# Patient Record
Sex: FEMALE | Race: WHITE | NOT HISPANIC OR LATINO | Employment: PART TIME | URBAN - NONMETROPOLITAN AREA
[De-identification: names, ages, dates, MRNs, and addresses within clinical notes are randomized per-mention and may not be internally consistent; named-entity substitution may affect disease eponyms.]

---

## 2017-01-16 ENCOUNTER — MEDICAL CORRESPONDENCE (OUTPATIENT)
Dept: HEALTH INFORMATION MANAGEMENT | Facility: HOSPITAL | Age: 57
End: 2017-01-16

## 2017-01-17 DIAGNOSIS — M54.2 CERVICALGIA: Primary | ICD-10-CM

## 2017-01-23 ENCOUNTER — HOSPITAL ENCOUNTER (OUTPATIENT)
Dept: INTERVENTIONAL RADIOLOGY/VASCULAR | Facility: HOSPITAL | Age: 57
Discharge: HOME | End: 2017-01-23
Attending: NURSE PRACTITIONER | Admitting: NURSE PRACTITIONER
Payer: COMMERCIAL

## 2017-01-23 PROCEDURE — 25000125 ZZHC RX 250

## 2017-01-23 PROCEDURE — 25000125 ZZHC RX 250: Performed by: RADIOLOGY

## 2017-01-23 PROCEDURE — 62321 NJX INTERLAMINAR CRV/THRC: CPT | Mod: TC

## 2017-01-23 RX ORDER — IOPAMIDOL 612 MG/ML
15 INJECTION, SOLUTION INTRATHECAL ONCE
Status: COMPLETED | OUTPATIENT
Start: 2017-01-23 | End: 2017-01-23

## 2017-01-23 RX ORDER — METHYLPREDNISOLONE ACETATE 80 MG/ML
INJECTION, SUSPENSION INTRA-ARTICULAR; INTRALESIONAL; INTRAMUSCULAR; SOFT TISSUE
Status: DISPENSED
Start: 2017-01-23 | End: 2017-01-23

## 2017-01-23 RX ORDER — LIDOCAINE HYDROCHLORIDE 10 MG/ML
INJECTION, SOLUTION EPIDURAL; INFILTRATION; INTRACAUDAL; PERINEURAL
Status: COMPLETED
Start: 2017-01-23 | End: 2017-01-23

## 2017-01-23 RX ORDER — METHYLPREDNISOLONE ACETATE 80 MG/ML
80 INJECTION, SUSPENSION INTRA-ARTICULAR; INTRALESIONAL; INTRAMUSCULAR; SOFT TISSUE ONCE
Status: COMPLETED | OUTPATIENT
Start: 2017-01-23 | End: 2017-01-23

## 2017-01-23 RX ADMIN — METHYLPREDNISOLONE ACETATE 80 MG: 80 INJECTION, SUSPENSION INTRA-ARTICULAR; INTRALESIONAL; INTRAMUSCULAR; SOFT TISSUE at 09:03

## 2017-01-23 RX ADMIN — IOPAMIDOL 5 ML: 612 INJECTION, SOLUTION INTRATHECAL at 09:03

## 2017-01-23 RX ADMIN — LIDOCAINE HYDROCHLORIDE 3 ML: 10 INJECTION, SOLUTION EPIDURAL; INFILTRATION; INTRACAUDAL; PERINEURAL at 09:03

## 2017-01-23 NOTE — DISCHARGE INSTRUCTIONS
Cell number on file:    Telephone Information:   Mobile [phone]     Is it ok to leave a message if you are not home yes    Dr Francis completed your pillo cervical procedure on 1/23/2017.    Current Pain Level (0-10 Scale): 6/10  Post Pain Level (0-10):  6/10    Patient will be contacted by a diagnostic imaging nurse via telephone for follow up on pain levels in 2 weeks.    Radiology Discharge instructions for Steroid Injection    Activity Level:     Do not do any heavy activity or exercise for 24 hours.   Do not drive for 4 hours after your injection.  Diet:   Return to your normal diet.  Medications:   If you have stopped taking your Aspirin, Coumadin/Warfarin, Ibuprofen, or any   other blood thinner for this procedure you may resume in the morning unless   your primary care provider has given you other instructions.    Diabetics may see an increase in blood sugar after steroid injections. If you are concerned about your blood sugar, please contact your family doctor.    Site Care:  Remove the bandage and bathe or shower the morning after the procedure.      Call your Primary Care Provider if you have the following (if your primary care provider is not available please seek emergency care):   Nausea with vomiting   Severe headache   Drowsiness or confusion   Redness or drainage at the injection or puncture site   Temperature over 101 degrees F   Other concerns   Worsening back pain   Stiff neck    If you have any questions or concerns, please call [phone].

## 2017-01-23 NOTE — PROGRESS NOTES
Injection Documentation of Provider History    PER-PROVIDER HISTORY, Dr. cruz  Cervical spine  Is this for treatment of acute herpes zoster, post herpetic neuralgia, post-decompressive radiculitis, or post-surgical scarring?   No  Does the patient have radiculopathy or sciatica?  Yes  How long has the patient had any physical therapy, home therapy or chiropractor care?  0 weeks.  How long has the patient taken NSaids or muscle relaxants?  6 weeks.  Is there any history of systemic/local infection or unstable medical conditions?  No

## 2017-01-23 NOTE — IP AVS SNAPSHOT
[facility]    [address] [place] [address]    Phone:  [phone]    Fax:  [phone]                                       After Visit Summary   1/23/2017    Elsi Krause    MRN: [id number]           After Visit Summary Signature Page     I have received my discharge instructions, and my questions have been answered. I have discussed any challenges I see with this plan with the nurse or doctor.    ..........................................................................................................................................  Patient/Patient Representative Signature      ..........................................................................................................................................  Patient Representative Print Name and Relationship to Patient    ..................................................               ................................................  Date                                            Time    ..........................................................................................................................................  Reviewed by Signature/Title    ...................................................              ..............................................  Date                                                            Time

## 2017-01-23 NOTE — IP AVS SNAPSHOT
MRN:[id number]                      After Visit Summary   1/23/2017    Elsi Krause    MRN: [id number]           Visit Information        Provider Department      1/23/2017  8:30 AM Radiologist, Need Interventional; [facility]           Review of your medicines      UNREVIEWED medicines. Ask your doctor about these medicines        Dose / Directions    ATIVAN PO        Dose:  0.5 mg   Take 0.5 mg by mouth 2 times daily   Refills:  0       BuPROPion HCl ER (XL) 450 MG Tb24        Dose:  450 mg   Take 450 mg by mouth daily   Refills:  0       FLEXERIL PO        Dose:  10 mg   Take 10 mg by mouth every 8 hours as needed for muscle spasms   Refills:  0       HYDROcodone-acetaminophen 5-325 MG per tablet   Commonly known as:  NORCO        Dose:  1 tablet   Take 1 tablet by mouth every 6 hours as needed for moderate to severe pain   Refills:  0       lisinopril-hydrochlorothiazide 20-25 MG per tablet   Commonly known as:  PRINZIDE/ZESTORETIC        Dose:  1 tablet   Take 1 tablet by mouth daily   Refills:  0       naproxen 500 MG tablet   Commonly known as:  NAPROSYN   Used for:  Cervical radiculopathy        Dose:  500 mg   Take 1 tablet (500 mg) by mouth 2 times daily as needed for moderate pain   Quantity:  60 tablet   Refills:  0       PRAVASTATIN SODIUM PO        Dose:  1 tablet   Take 1 tablet by mouth daily   Refills:  0                Protect others around you: Learn how to safely use, store and throw away your medicines at www.disposemymeds.org.         Follow-ups after your visit         Care Instructions        Further instructions from your care team       Cell number on file:    Telephone Information:   Mobile [phone]     Is it ok to leave a message if you are not home yes    Dr Francis completed your pillo cervical procedure on 1/23/2017.    Current Pain Level (0-10 Scale): 6/10  Post Pain Level (0-10):  6/10    Patient will be contacted by a  "diagnostic imaging nurse via telephone for follow up on pain levels in 2 weeks.    Radiology Discharge instructions for Steroid Injection    Activity Level:     Do not do any heavy activity or exercise for 24 hours.   Do not drive for 4 hours after your injection.  Diet:   Return to your normal diet.  Medications:   If you have stopped taking your Aspirin, Coumadin/Warfarin, Ibuprofen, or any   other blood thinner for this procedure you may resume in the morning unless   your primary care provider has given you other instructions.    Diabetics may see an increase in blood sugar after steroid injections. If you are concerned about your blood sugar, please contact your family doctor.    Site Care:  Remove the bandage and bathe or shower the morning after the procedure.      Call your Primary Care Provider if you have the following (if your primary care provider is not available please seek emergency care):   Nausea with vomiting   Severe headache   Drowsiness or confusion   Redness or drainage at the injection or puncture site   Temperature over 101 degrees F   Other concerns   Worsening back pain   Stiff neck    If you have any questions or concerns, please call [phone].        Additional Information About Your Visit        [employer] Information     [employer] lets you send messages to your doctor, view your test results, renew your prescriptions, schedule appointments and more. To sign up, go to [email or web address] . Click on \"Log in\" on the left side of the screen, which will take you to the Welcome page. Then click on \"Sign up Now\" on the right side of the page.     You will be asked to enter the access code listed below, as well as some personal information. Please follow the directions to create your username and password.     Your access code is: [email or web address]  Expires: 2017  2:32 PM     Your access code will  in 90 days. If you need help or a new code, please call your [facility] clinic or " [phone].        Care EveryWhere ID     This is your Care EveryWhere ID. This could be used by other organizations to access your [facility] medical records  [id number]         Primary Care Provider Office Phone # Fax #    Benny Camargo [phone]      Thank you!     Thank you for choosing [facility] for your care. Our goal is always to provide you with excellent care. Hearing back from our patients is one way we can continue to improve our services. Please take a few minutes to complete the written survey that you may receive in the mail after you visit with us. Thank you!             Medication List: This is a list of all your medications and when to take them. Check marks below indicate your daily home schedule. Keep this list as a reference.      Medications           Morning Afternoon Evening Bedtime As Needed    ATIVAN PO   Take 0.5 mg by mouth 2 times daily                                BuPROPion HCl ER (XL) 450 MG Tb24   Take 450 mg by mouth daily                                FLEXERIL PO   Take 10 mg by mouth every 8 hours as needed for muscle spasms                                HYDROcodone-acetaminophen 5-325 MG per tablet   Commonly known as:  NORCO   Take 1 tablet by mouth every 6 hours as needed for moderate to severe pain                                lisinopril-hydrochlorothiazide 20-25 MG per tablet   Commonly known as:  PRINZIDE/ZESTORETIC   Take 1 tablet by mouth daily                                naproxen 500 MG tablet   Commonly known as:  NAPROSYN   Take 1 tablet (500 mg) by mouth 2 times daily as needed for moderate pain                                PRAVASTATIN SODIUM PO   Take 1 tablet by mouth daily

## 2017-01-23 NOTE — PROGRESS NOTES
Fluoro time for this case was 48 seconds, less than 5 min  Was patient held? NO  If yes, by whom? NA

## 2017-03-09 ENCOUNTER — OFFICE VISIT (OUTPATIENT)
Dept: FAMILY MEDICINE | Facility: OTHER | Age: 57
End: 2017-03-09
Attending: PHYSICIAN ASSISTANT
Payer: COMMERCIAL

## 2017-03-09 VITALS
SYSTOLIC BLOOD PRESSURE: 128 MMHG | HEIGHT: 65 IN | OXYGEN SATURATION: 99 % | WEIGHT: 160 LBS | BODY MASS INDEX: 26.66 KG/M2 | RESPIRATION RATE: 16 BRPM | DIASTOLIC BLOOD PRESSURE: 78 MMHG | HEART RATE: 85 BPM | TEMPERATURE: 98.6 F

## 2017-03-09 DIAGNOSIS — I10 BENIGN ESSENTIAL HYPERTENSION: ICD-10-CM

## 2017-03-09 DIAGNOSIS — M54.12 CERVICAL RADICULOPATHY: Primary | ICD-10-CM

## 2017-03-09 PROCEDURE — 99213 OFFICE O/P EST LOW 20 MIN: CPT | Performed by: PHYSICIAN ASSISTANT

## 2017-03-09 RX ORDER — HYDROCODONE BITARTRATE AND ACETAMINOPHEN 5; 325 MG/1; MG/1
TABLET ORAL
Qty: 30 TABLET | Refills: 0 | Status: SHIPPED | OUTPATIENT
Start: 2017-03-09 | End: 2019-10-10

## 2017-03-09 RX ORDER — NAPROXEN 500 MG/1
500 TABLET ORAL 2 TIMES DAILY PRN
Qty: 60 TABLET | Refills: 0 | Status: SHIPPED | OUTPATIENT
Start: 2017-03-09 | End: 2019-10-10

## 2017-03-09 RX ORDER — LISINOPRIL AND HYDROCHLOROTHIAZIDE 20; 25 MG/1; MG/1
1 TABLET ORAL DAILY
Qty: 90 TABLET | Refills: 3 | Status: SHIPPED | OUTPATIENT
Start: 2017-03-09 | End: 2018-02-21

## 2017-03-09 ASSESSMENT — ANXIETY QUESTIONNAIRES
2. NOT BEING ABLE TO STOP OR CONTROL WORRYING: NEARLY EVERY DAY
3. WORRYING TOO MUCH ABOUT DIFFERENT THINGS: NEARLY EVERY DAY
1. FEELING NERVOUS, ANXIOUS, OR ON EDGE: NEARLY EVERY DAY
6. BECOMING EASILY ANNOYED OR IRRITABLE: MORE THAN HALF THE DAYS
IF YOU CHECKED OFF ANY PROBLEMS ON THIS QUESTIONNAIRE, HOW DIFFICULT HAVE THESE PROBLEMS MADE IT FOR YOU TO DO YOUR WORK, TAKE CARE OF THINGS AT HOME, OR GET ALONG WITH OTHER PEOPLE: SOMEWHAT DIFFICULT
GAD7 TOTAL SCORE: 19
7. FEELING AFRAID AS IF SOMETHING AWFUL MIGHT HAPPEN: NEARLY EVERY DAY
5. BEING SO RESTLESS THAT IT IS HARD TO SIT STILL: MORE THAN HALF THE DAYS

## 2017-03-09 ASSESSMENT — PAIN SCALES - GENERAL: PAINLEVEL: MODERATE PAIN (4)

## 2017-03-09 ASSESSMENT — PATIENT HEALTH QUESTIONNAIRE - PHQ9: 5. POOR APPETITE OR OVEREATING: NEARLY EVERY DAY

## 2017-03-09 NOTE — MR AVS SNAPSHOT
After Visit Summary   3/9/2017    Elsi Krause    MRN: [id number]           Patient Information     Date Of Birth          1960        Visit Information        Provider Department      3/9/2017 10:15 AM Carolina Chacon PA [facility]        Today's Diagnoses     Cervical radiculopathy    -  1    Benign essential hypertension          Care Instructions    Follow up if symptoms persist or worsen.          Follow-ups after your visit        Additional Services     NEUROSURGERY REFERRAL       Your provider has referred you to: Neurosurgery    Please be aware that coverage of these services is subject to the terms and limitations of your health insurance plan.  Call member services at your health plan with any benefit or coverage questions.      Please bring the following with you to your appointment:    (1) Any X-Rays, CTs or MRIs which have been performed.  Contact the facility where they were done to arrange for  prior to your scheduled appointment.   (2) List of current medications  (3) This referral request   (4) Any documents/labs given to you for this referral                  Who to contact     If you have questions or need follow up information about today's clinic visit or your schedule please contact [facility] directly at [phone].  Normal or non-critical lab and imaging results will be communicated to you by MyChart, letter or phone within 4 business days after the clinic has received the results. If you do not hear from us within 7 days, please contact the clinic through MyChart or phone. If you have a critical or abnormal lab result, we will notify you by phone as soon as possible.  Submit refill requests through [employer] or call your pharmacy and they will forward the refill request to us. Please allow 3 business days for your refill to be completed.          Additional Information About Your Visit        MyChart Information     [employer] lets  "you send messages to your doctor, view your test results, renew your prescriptions, schedule appointments and more. To sign up, go to [email or web address] . Click on \"Log in\" on the left side of the screen, which will take you to the Welcome page. Then click on \"Sign up Now\" on the right side of the page.     You will be asked to enter the access code listed below, as well as some personal information. Please follow the directions to create your username and password.     Your access code is: [email or web address]  Expires: 2017 10:43 AM     Your access code will  in 90 days. If you need help or a new code, please call your [facility] clinic or [phone].        Care EveryWhere ID     This is your Care EveryWhere ID. This could be used by other organizations to access your [facility] medical records  [id number]        Your Vitals Were     Pulse Temperature Respirations Height Pulse Oximetry BMI (Body Mass Index)    85 98.6  F (37  C) (Tympanic) 16 5' 5\" (1.651 m) 99% 26.63 kg/m2       Blood Pressure from Last 3 Encounters:   17 128/78   16 124/64   16 120/60    Weight from Last 3 Encounters:   17 160 lb (72.6 kg)   16 150 lb (68 kg)   16 140 lb (63.5 kg)              We Performed the Following     NEUROSURGERY REFERRAL          Today's Medication Changes          These changes are accurate as of: 3/9/17 10:43 AM.  If you have any questions, ask your nurse or doctor.               These medicines have changed or have updated prescriptions.        Dose/Directions    HYDROcodone-acetaminophen 5-325 MG per tablet   Commonly known as:  NORCO   This may have changed:    - how much to take  - how to take this  - when to take this  - reasons to take this  - additional instructions   Used for:  Cervical radiculopathy   Changed by:  Carolina Chacon PA        1-2 tabs HS prn   Quantity:  30 tablet   Refills:  0            Where to get your medicines      These medications were sent " to [id number] - [place], [place] - [address] AT [facility] [address]  [address], [place] [address]     Phone:  [phone]     lisinopril-hydrochlorothiazide 20-25 MG per tablet    naproxen 500 MG tablet         Some of these will need a paper prescription and others can be bought over the counter.  Ask your nurse if you have questions.     Bring a paper prescription for each of these medications     HYDROcodone-acetaminophen 5-325 MG per tablet                Primary Care Provider Office Phone # Fax #    Benny Camargo [phone]       [facility] [address]  [place] [address]        Thank you!     Thank you for choosing [facility]  for your care. Our goal is always to provide you with excellent care. Hearing back from our patients is one way we can continue to improve our services. Please take a few minutes to complete the written survey that you may receive in the mail after your visit with us. Thank you!             Your Updated Medication List - Protect others around you: Learn how to safely use, store and throw away your medicines at www.disposemymeds.org.          This list is accurate as of: 3/9/17 10:43 AM.  Always use your most recent med list.                   Brand Name Dispense Instructions for use    ATIVAN PO      Take 0.5 mg by mouth 2 times daily       BuPROPion HCl ER (XL) 450 MG Tb24      Take 450 mg by mouth daily       FLEXERIL PO      Take 10 mg by mouth every 8 hours as needed for muscle spasms       HYDROcodone-acetaminophen 5-325 MG per tablet    NORCO    30 tablet    1-2 tabs HS prn       lisinopril-hydrochlorothiazide 20-25 MG per tablet    PRINZIDE/ZESTORETIC    90 tablet    Take 1 tablet by mouth daily       naproxen 500 MG tablet    NAPROSYN    60 tablet    Take 1 tablet (500 mg) by mouth 2 times daily as needed for moderate pain       PRAVASTATIN SODIUM PO      Take 1 tablet by mouth daily       TRAMADOL  HCL PO      Take by mouth At Bedtime Unsure of dose

## 2017-03-09 NOTE — PROGRESS NOTES
Subjective:  Elsi Krause is a 56 year old female  who presents for f/u of cervical radiculopathy present for almost 1 year. Pain extends to left tricep and on the right pain all the way down arm and into hand. Paresthesias to right hand/fingers.She has had massage therapy. Had BLAIR January 2017 without any benefit.    PMH, PSH, FH reviewed and updated    Current Outpatient Prescriptions   Medication     TRAMADOL HCL PO     naproxen (NAPROSYN) 500 MG tablet     PRAVASTATIN SODIUM PO     Cyclobenzaprine HCl (FLEXERIL PO)     HYDROcodone-acetaminophen (NORCO) 5-325 MG per tablet     BuPROPion HCl ER, XL, 450 MG TB24     LORazepam (ATIVAN PO)     lisinopril-hydrochlorothiazide (PRINZIDE,ZESTORETIC) 20-25 MG per tablet     No current facility-administered medications for this visit.        No Known Allergies    Review of Systems:  Gen: negative for fever, chills, change in weight  Derm: negative for  rash or lesions  Musculoskeletal: as above  Neuro: as above      Objective:    B/P: 128/78, T: 98.6, P: 85, R: 16    Physical Exam:  Constitutional: healthy, alert and no acute distress  Skin: No suspicious lesions, erythema or ecchymosis  Musculoskeletal:TTP lower cervical spine and cervical paraspinous muscles.  Neuroc: Sensation intact. DTR's 1+ and symmetric  Psych: mentation and affect appear normal      Assessment:    (M54.12) Cervical radiculopathy  (primary encounter diagnosis)  Comment: Refill meds and refer to Neurosurgery - [place] for consult  Plan: naproxen (NAPROSYN) 500 MG tablet,         HYDROcodone-acetaminophen (NORCO) 5-325 MG per         Tablet #30            (I10) Benign essential hypertension  Comment: refill  Plan: lisinopril-hydrochlorothiazide         (PRINZIDE/ZESTORETIC) 20-25 MG per tablet              Rx per EPIC.  Risk/benefit/side effects and intended purposes discussed.   Rest. RICE. Follow up if symptoms persist or worsen.      Carolina Chacon, PAC

## 2017-03-09 NOTE — NURSING NOTE
"Chief Complaint   Patient presents with     Shoulder Pain     bilateral shoulder pain and arms hurt for at least 6 months       Initial /78 (BP Location: Right arm, Patient Position: Chair, Cuff Size: Adult Regular)  Pulse 85  Temp 98.6  F (37  C) (Tympanic)  Resp 16  Ht 5' 5\" (1.651 m)  Wt 160 lb (72.6 kg)  SpO2 99%  BMI 26.63 kg/m2 Estimated body mass index is 26.63 kg/(m^2) as calculated from the following:    Height as of this encounter: 5' 5\" (1.651 m).    Weight as of this encounter: 160 lb (72.6 kg).  Medication Reconciliation: complete   Patricia Kern LPN      "

## 2017-03-10 ASSESSMENT — ANXIETY QUESTIONNAIRES: GAD7 TOTAL SCORE: 19

## 2017-03-10 ASSESSMENT — PATIENT HEALTH QUESTIONNAIRE - PHQ9: SUM OF ALL RESPONSES TO PHQ QUESTIONS 1-9: 16

## 2017-04-05 ENCOUNTER — TELEPHONE (OUTPATIENT)
Dept: FAMILY MEDICINE | Facility: OTHER | Age: 57
End: 2017-04-05

## 2017-04-05 NOTE — TELEPHONE ENCOUNTER
11:49 AM    Reason for Call: Phone Call    Description: Pt calling to receive medical advise about pinched nerves in her legs and when she showers she sees that her stomach is bruised, per pt.  Pt was told that Ines (whom pt has seen) is out today, but that a nurse would return the call.    Was an appointment offered for this call? No    Preferred method for responding to this message: Telephone Call: [phone]    If we cannot reach you directly, may we leave a detailed response at the number you provided? Yes    Can this message wait until your PCP/provider returns, if available today? No, pt is requesting a nurse to return the call today.    Delmi Adkins

## 2017-04-05 NOTE — TELEPHONE ENCOUNTER
FYI: Pt calls back she is having pain in one of her legs. She notes the skin on her abd is darker in the am when she is showering and then lightens later in the day. She would like advice regarding this. I asked her who her primary is. He is Dr Camargo at a different clinic. I advised her she we have very little medical hx on her. She states she will contact her primary in the am.

## 2017-05-25 ENCOUNTER — TELEPHONE (OUTPATIENT)
Dept: FAMILY MEDICINE | Facility: OTHER | Age: 57
End: 2017-05-25

## 2017-05-25 NOTE — TELEPHONE ENCOUNTER
10:18 AM    Reason for Call: OVERBOOK    Patient is having the following symptoms: Severe nerve pain  For   4 days    The patient is requesting an appointment for  Today with   Carolina Chacon    Was an appointment offered for this call? No    Preferred method for responding to this message: [phone]    If we cannot reach you directly, may we leave a detailed response at the number you provided?  Yes        Diana Camp

## 2017-05-25 NOTE — TELEPHONE ENCOUNTER
Carolina cannot see her today, but we can see her tomorrow.  Otherwise Urgent Care if she cannot wait until then.  Thanks.  Patricia Kern LPN

## 2018-02-21 DIAGNOSIS — I10 BENIGN ESSENTIAL HYPERTENSION: ICD-10-CM

## 2018-02-21 NOTE — TELEPHONE ENCOUNTER
Lisinopril- hctz       Last Written Prescription Date:  3/9/17  Last Fill Quantity: 90,   # refills: 3  Last Office Visit: 3/9/17  Future Office visit:

## 2018-02-22 RX ORDER — LISINOPRIL AND HYDROCHLOROTHIAZIDE 20; 25 MG/1; MG/1
1 TABLET ORAL DAILY
Qty: 90 TABLET | Refills: 0 | Status: SHIPPED | OUTPATIENT
Start: 2018-02-22 | End: 2018-07-13

## 2018-07-13 DIAGNOSIS — I10 BENIGN ESSENTIAL HYPERTENSION: ICD-10-CM

## 2018-07-13 RX ORDER — LISINOPRIL AND HYDROCHLOROTHIAZIDE 20; 25 MG/1; MG/1
1 TABLET ORAL DAILY
Qty: 90 TABLET | Refills: 0 | Status: SHIPPED | OUTPATIENT
Start: 2018-07-13 | End: 2018-10-12

## 2018-07-13 NOTE — TELEPHONE ENCOUNTER
Lisinopril HCTZ  Last Office Visit: 3/9/2017  Last Refill Date:2/22/2018  # 90          Refills  0      Thank you!

## 2018-07-13 NOTE — TELEPHONE ENCOUNTER
No future visit scheduled.  PCP is listed as Camargo.  Please advise if this needs to be sent to him.  Thank you

## 2018-10-12 DIAGNOSIS — I10 BENIGN ESSENTIAL HYPERTENSION: ICD-10-CM

## 2018-10-12 NOTE — TELEPHONE ENCOUNTER
lisinopril-hydrochlorothiazide (PRINZIDE/ZESTORETIC) 20-25 MG per tablet     Last Written Prescription Date:  07/13/2018  Last Fill Quantity: 90,   # refills: 0  Last Office Visit: 03/09/2017  Future Office visit:       Routing refill request to provider for review/approval because:

## 2018-10-16 RX ORDER — LISINOPRIL AND HYDROCHLOROTHIAZIDE 20; 25 MG/1; MG/1
1 TABLET ORAL DAILY
Qty: 90 TABLET | Refills: 0 | Status: SHIPPED | OUTPATIENT
Start: 2018-10-16 | End: 2023-06-26

## 2019-10-10 ENCOUNTER — OFFICE VISIT (OUTPATIENT)
Dept: FAMILY MEDICINE | Facility: OTHER | Age: 59
End: 2019-10-10
Attending: PHYSICIAN ASSISTANT
Payer: COMMERCIAL

## 2019-10-10 VITALS
WEIGHT: 164.2 LBS | DIASTOLIC BLOOD PRESSURE: 62 MMHG | BODY MASS INDEX: 27.36 KG/M2 | HEIGHT: 65 IN | SYSTOLIC BLOOD PRESSURE: 102 MMHG | HEART RATE: 71 BPM | OXYGEN SATURATION: 94 % | TEMPERATURE: 98.2 F

## 2019-10-10 DIAGNOSIS — M79.671 RIGHT FOOT PAIN: Primary | ICD-10-CM

## 2019-10-10 PROBLEM — Z98.1 STATUS POST CERVICAL SPINAL FUSION: Status: ACTIVE | Noted: 2017-09-28

## 2019-10-10 PROCEDURE — 99213 OFFICE O/P EST LOW 20 MIN: CPT | Performed by: PHYSICIAN ASSISTANT

## 2019-10-10 RX ORDER — TRAMADOL HYDROCHLORIDE 50 MG/1
50 TABLET ORAL EVERY 6 HOURS PRN
COMMUNITY
End: 2019-10-10

## 2019-10-10 RX ORDER — IBUPROFEN 800 MG/1
800 TABLET, FILM COATED ORAL 3 TIMES DAILY
COMMUNITY
Start: 2018-12-20 | End: 2019-10-10

## 2019-10-10 RX ORDER — HYDROCODONE BITARTRATE AND ACETAMINOPHEN 5; 325 MG/1; MG/1
TABLET ORAL
Qty: 10 TABLET | Refills: 0 | Status: SHIPPED | OUTPATIENT
Start: 2019-10-10 | End: 2021-08-17

## 2019-10-10 RX ORDER — TRAMADOL HYDROCHLORIDE 50 MG/1
TABLET ORAL
Refills: 2 | COMMUNITY
Start: 2019-04-18 | End: 2021-08-17

## 2019-10-10 RX ORDER — MELOXICAM 15 MG/1
15 TABLET ORAL DAILY
Refills: 3 | COMMUNITY
Start: 2019-08-19 | End: 2023-05-11

## 2019-10-10 ASSESSMENT — MIFFLIN-ST. JEOR: SCORE: 1325.69

## 2019-10-10 ASSESSMENT — PAIN SCALES - GENERAL: PAINLEVEL: EXTREME PAIN (8)

## 2019-10-10 NOTE — PROGRESS NOTES
Subjective     Elsi Krause is a 59 year old female who presents to clinic today for the following health issues: Right foot pain that has worsened the past 5 days. She had surgery in May and is scheduling podiatry visit at [facility]. She is unable to sleep at night secondary to pain.    HPI   Musculoskeletal problem/pain      Duration: ongoing     Description  Location: both feet , right foot pain is worse the pain is worse with wearing shoes and walking     Intensity:  severe    Accompanying signs and symptoms: swelling    History  Previous similar problem: YES    Previous evaluation:  x-ray and MRI history of imaging done through [facility]     Precipitating or alleviating factors:  Trauma or overuse: YES  Aggravating factors include: standing and walking    Therapies tried and outcome: rest/inactivity, heat, ice and tramadol , epsom foot soaks . Ice seems to help         Patient Active Problem List   Diagnosis     Drug overdose     Drug overdose, multiple drugs     ACP (advance care planning)     Arthritis of right foot     Cervical radicular pain     Essential hypertension     Impaired fasting glucose     Leiomyoma of uterus, unspecified     Osteoarthritis of ankle     Status post cervical spinal fusion     Urinary tract infection, site not specified     Past Surgical History:   Procedure Laterality Date     ENDOMETRIAL SAMPLING (BIOPSY)  12/27/2001     FOOT SURGERY Bilateral 1973     HC EXCISE CUTANEOUS NEUROMA  11/05/1997    Times 3      TUBAL LIGATION Bilateral 04/1989       Social History     Tobacco Use     Smoking status: Former Smoker     Smokeless tobacco: Never Used   Substance Use Topics     Alcohol use: Yes     Comment: Social     Family History   Problem Relation Age of Onset     Diabetes Father      Coronary Artery Disease Father      Hypertension Father      Hyperlipidemia Father      Chronic Obstructive Pulmonary Disease Father      Diabetes Mother      Hypertension Mother       Hyperlipidemia Mother      Diabetes Sister      Hypertension Sister      Hyperlipidemia Sister      Prostate Cancer Paternal Grandfather      Neurologic Disorder Son         Cerebral palsy     Cancer - colorectal Father          Current Outpatient Medications   Medication Sig Dispense Refill     Cyclobenzaprine HCl (FLEXERIL PO) Take 10 mg by mouth every 8 hours as needed for muscle spasms       diclofenac (VOLTAREN) 1 % topical gel Place 2 g onto the skin 4 times daily 100 g 1     HYDROcodone-acetaminophen (NORCO) 5-325 MG tablet 1 tab HS prn 10 tablet 0     lisinopril-hydrochlorothiazide (PRINZIDE/ZESTORETIC) 20-25 MG per tablet Take 1 tablet by mouth daily 90 tablet 0     LORazepam (ATIVAN PO) Take 0.5 mg by mouth 2 times daily       meloxicam (MOBIC) 15 MG tablet 15 mg daily  3     PRAVASTATIN SODIUM PO Take 40 mg by mouth daily        traMADol (ULTRAM) 50 MG tablet TK 1 T PO Q 4 TO 6 HOURS PRN FOR PAIN  2     No Known Allergies      Reviewed and updated as needed this visit by Provider         Review of Systems   ROS COMP: Constitutional, HEENT, cardiovascular, pulmonary, gi and gu systems are negative, except as otherwise noted.      Objective    There were no vitals taken for this visit.  There is no height or weight on file to calculate BMI.  Physical Exam       PMH, PSH, FH reviewed and updated    Current Outpatient Medications   Medication     Cyclobenzaprine HCl (FLEXERIL PO)     diclofenac (VOLTAREN) 1 % topical gel     HYDROcodone-acetaminophen (NORCO) 5-325 MG tablet     lisinopril-hydrochlorothiazide (PRINZIDE/ZESTORETIC) 20-25 MG per tablet     LORazepam (ATIVAN PO)     meloxicam (MOBIC) 15 MG tablet     PRAVASTATIN SODIUM PO     traMADol (ULTRAM) 50 MG tablet     No current facility-administered medications for this visit.        No Known Allergies    Review of Systems:  Gen: negative for fever, chills, change in weight  Derm: negative for  rash or lesions  Musculoskeletal: as above  Neuro: as  above      Objective:    B/P: 102/62, T: 98.2, P: 71, R: Data Unavailable    Physical Exam:  Constitutional: healthy, alert and no acute distress  Skin: No suspicious lesions, erythema or ecchymosis  Musculoskeletal: Deformity over right 5th metatarsal. TTP. TTP diffusely over forefoot  Neuroc: Sensation intact.   Psych: mentation and affect appear normal       Assessment:    (M79.911) Right foot pain  (primary encounter diagnosis)  Comment: She will make appt to f/u with Dr. Camargo and podiatry  Plan: HYDROcodone-acetaminophen (NORCO) 5-325 MG #10 tabs        tablet, diclofenac (VOLTAREN) 1 % topical gel           Rx per EPIC.  Risk/benefit/side effects and intended purposes discussed.   Rest. RICE. Follow up if symptoms persist or worsen.      Carolina Chacon, PAC

## 2019-10-10 NOTE — NURSING NOTE
"Chief Complaint   Patient presents with     Foot Pain     right foor pain has been gatting worse        Initial /62 (BP Location: Right arm, Patient Position: Chair, Cuff Size: Adult Regular)   Pulse 71   Ht 1.651 m (5' 5\")   Wt 74.5 kg (164 lb 3.2 oz)   SpO2 94%   BMI 27.32 kg/m   Estimated body mass index is 27.32 kg/m  as calculated from the following:    Height as of this encounter: 1.651 m (5' 5\").    Weight as of this encounter: 74.5 kg (164 lb 3.2 oz).  Medication Reconciliation: complete  Anabella Fung LPN  "

## 2020-01-14 ENCOUNTER — TRANSFERRED RECORDS (OUTPATIENT)
Dept: HEALTH INFORMATION MANAGEMENT | Facility: CLINIC | Age: 60
End: 2020-01-14

## 2020-01-23 ENCOUNTER — TRANSFERRED RECORDS (OUTPATIENT)
Dept: HEALTH INFORMATION MANAGEMENT | Facility: CLINIC | Age: 60
End: 2020-01-23

## 2020-01-24 DIAGNOSIS — L03.90 CELLULITIS: Primary | ICD-10-CM

## 2020-01-27 ENCOUNTER — RESULTS ONLY (OUTPATIENT)
Dept: LAB | Age: 60
End: 2020-01-27

## 2020-01-27 ENCOUNTER — TRANSFERRED RECORDS (OUTPATIENT)
Dept: HEALTH INFORMATION MANAGEMENT | Facility: CLINIC | Age: 60
End: 2020-01-27

## 2020-01-27 ENCOUNTER — APPOINTMENT (OUTPATIENT)
Dept: LAB | Facility: HOSPITAL | Age: 60
End: 2020-01-27
Attending: PODIATRIST
Payer: COMMERCIAL

## 2020-01-27 DIAGNOSIS — S93.304A OPEN DISLOCATION OF RIGHT FOOT: Primary | ICD-10-CM

## 2020-01-27 DIAGNOSIS — S91.301A OPEN DISLOCATION OF RIGHT FOOT: Primary | ICD-10-CM

## 2020-01-27 LAB
BASOPHILS # BLD AUTO: 0 10E9/L (ref 0–0.2)
BASOPHILS NFR BLD AUTO: 0.5 %
CRP SERPL-MCNC: 4.9 MG/L (ref 0–8)
DIFFERENTIAL METHOD BLD: NORMAL
EOSINOPHIL # BLD AUTO: 0.1 10E9/L (ref 0–0.7)
EOSINOPHIL NFR BLD AUTO: 1.9 %
ERYTHROCYTE [DISTWIDTH] IN BLOOD BY AUTOMATED COUNT: 12.4 % (ref 10–15)
ERYTHROCYTE [SEDIMENTATION RATE] IN BLOOD BY WESTERGREN METHOD: 38 MM/H (ref 0–30)
HCT VFR BLD AUTO: 35.1 % (ref 35–47)
HGB BLD-MCNC: 12.1 G/DL (ref 11.7–15.7)
IMM GRANULOCYTES # BLD: 0 10E9/L (ref 0–0.4)
IMM GRANULOCYTES NFR BLD: 0.2 %
LYMPHOCYTES # BLD AUTO: 1.7 10E9/L (ref 0.8–5.3)
LYMPHOCYTES NFR BLD AUTO: 29.6 %
MCH RBC QN AUTO: 28.6 PG (ref 26.5–33)
MCHC RBC AUTO-ENTMCNC: 34.5 G/DL (ref 31.5–36.5)
MCV RBC AUTO: 83 FL (ref 78–100)
MONOCYTES # BLD AUTO: 0.4 10E9/L (ref 0–1.3)
MONOCYTES NFR BLD AUTO: 7.3 %
NEUTROPHILS # BLD AUTO: 3.5 10E9/L (ref 1.6–8.3)
NEUTROPHILS NFR BLD AUTO: 60.5 %
NRBC # BLD AUTO: 0 10*3/UL
NRBC BLD AUTO-RTO: 0 /100
PLATELET # BLD AUTO: 363 10E9/L (ref 150–450)
RBC # BLD AUTO: 4.23 10E12/L (ref 3.8–5.2)
URATE SERPL-MCNC: 4.7 MG/DL (ref 2.6–6)
WBC # BLD AUTO: 5.8 10E9/L (ref 4–11)

## 2020-01-27 PROCEDURE — 85025 COMPLETE CBC W/AUTO DIFF WBC: CPT | Performed by: PODIATRIST

## 2020-01-27 PROCEDURE — 86140 C-REACTIVE PROTEIN: CPT | Performed by: PODIATRIST

## 2020-01-27 PROCEDURE — 85652 RBC SED RATE AUTOMATED: CPT | Performed by: PODIATRIST

## 2020-01-27 PROCEDURE — 36415 COLL VENOUS BLD VENIPUNCTURE: CPT | Performed by: PODIATRIST

## 2020-01-27 PROCEDURE — 84550 ASSAY OF BLOOD/URIC ACID: CPT | Performed by: PODIATRIST

## 2020-01-28 DIAGNOSIS — S91.301A OPEN WOUND OF RIGHT FOOT: Primary | ICD-10-CM

## 2020-01-28 LAB
GRAM STN SPEC: ABNORMAL
SPECIMEN SOURCE: ABNORMAL

## 2020-01-29 ENCOUNTER — HOSPITAL ENCOUNTER (OUTPATIENT)
Dept: MRI IMAGING | Facility: HOSPITAL | Age: 60
Discharge: HOME | End: 2020-01-29
Attending: PODIATRIST | Admitting: PODIATRIST
Payer: COMMERCIAL

## 2020-01-29 DIAGNOSIS — T14.8XXA WOUND INFECTION: ICD-10-CM

## 2020-01-29 DIAGNOSIS — L08.9 WOUND INFECTION: ICD-10-CM

## 2020-01-29 DIAGNOSIS — M79.671 RIGHT FOOT PAIN: ICD-10-CM

## 2020-01-29 PROCEDURE — A9585 GADOBUTROL INJECTION: HCPCS | Performed by: RADIOLOGY

## 2020-01-29 PROCEDURE — 25500064 ZZH RX 255 OP 636: Performed by: RADIOLOGY

## 2020-01-29 PROCEDURE — 73720 MRI LWR EXTREMITY W/O&W/DYE: CPT | Mod: TC,RT

## 2020-01-29 RX ORDER — GADOBUTROL 604.72 MG/ML
7.5 INJECTION INTRAVENOUS ONCE
Status: COMPLETED | OUTPATIENT
Start: 2020-01-29 | End: 2020-01-29

## 2020-01-29 RX ADMIN — GADOBUTROL 7.5 ML: 604.72 INJECTION INTRAVENOUS at 08:27

## 2020-01-30 ENCOUNTER — OFFICE VISIT (OUTPATIENT)
Dept: WOUND CARE | Facility: OTHER | Age: 60
End: 2020-01-30
Attending: NURSE PRACTITIONER
Payer: COMMERCIAL

## 2020-01-30 VITALS
SYSTOLIC BLOOD PRESSURE: 140 MMHG | TEMPERATURE: 98.3 F | DIASTOLIC BLOOD PRESSURE: 78 MMHG | HEART RATE: 76 BPM | RESPIRATION RATE: 16 BRPM

## 2020-01-30 DIAGNOSIS — L97.512 SKIN ULCER OF RIGHT FOOT WITH FAT LAYER EXPOSED (H): Primary | ICD-10-CM

## 2020-01-30 LAB
BACTERIA SPEC CULT: ABNORMAL
SPECIMEN SOURCE: ABNORMAL

## 2020-01-30 PROCEDURE — 99214 OFFICE O/P EST MOD 30 MIN: CPT | Performed by: NURSE PRACTITIONER

## 2020-01-30 RX ORDER — LISINOPRIL 20 MG/1
20 TABLET ORAL DAILY
COMMUNITY
End: 2021-08-17

## 2020-01-30 ASSESSMENT — PAIN SCALES - GENERAL: PAINLEVEL: MILD PAIN (3)

## 2020-01-30 NOTE — NURSING NOTE
"Chief Complaint   Patient presents with     WOUND CARE       Initial BP (!) 140/78 (BP Location: Left arm, Patient Position: Sitting, Cuff Size: Adult Regular)   Pulse 76   Temp 98.3  F (36.8  C) (Tympanic)   Resp 16  Estimated body mass index is 27.32 kg/m  as calculated from the following:    Height as of 10/10/19: 1.651 m (5' 5\").    Weight as of 10/10/19: 74.5 kg (164 lb 3.2 oz).  Medication Reconciliation: complete  Natalya Ly LPN  "

## 2020-01-30 NOTE — PROGRESS NOTES
SUBJECTIVE:  Elsi Krause, 59 year old, female presents with the following Chief Complaint(s) with HPI to follow: wound care     HPI:  Patient is here for evaluation and treatment of her right foot ulcer.  She relates that she first noticed this ulcer after receiving a pedicure on 12/16/19.    She relates that has a previous history of multiple surgeries on this foot several years ago and more recently, with an arch repair, heel repair and some joints fused.    Dr. Eri Peters's ([facility]) notes document that she has been seen at multiple clinics for treatment of this issue to include: 12/18/19 [facility]- wound culture grew out staphylococcus.  She was initially treated with Clindamycin, then switched to Bactrim.  She was seen again on 1/14/20 at the [facility].  She was seen on 1/23/20 at the [facility]. She was started on colchicine for possible gout.  Another wound culture of serous fluid was done, no results available.  She saw Dr. Peters on 1/28/20. Dr. Peters ordered an MR of her right foot, which was done on 1/29/20.  The results showed no evidence of osteomyelitis or abscess, severe atrophy involving the abductor digiti minimi and flexor digiti minimi brevis muscles and moderate joint space narrowing involving the tarsometatarsal joints of the fourth and fifth digits.  Her wound was debrided in office, and a joint aspiration was done and cultured.  Patient is referred to wound care by Dr. Peters for treatment and will follow up with her again tomorrow.   At this time Elsi is dressing her wound with dry gauze only.  She has not tried any other type of dressing, other than when she initially sustained her injury she covered the area for several days with a lidocaine patch.     The area is very tender.  She is currently non weight bearing and using crutches to ambulate.    Patient Active Problem List   Diagnosis     Drug overdose     Drug overdose, multiple drugs      ACP (advance care planning)     Arthritis of right foot     Cervical radicular pain     Essential hypertension     Impaired fasting glucose     Leiomyoma of uterus, unspecified     Osteoarthritis of ankle     Status post cervical spinal fusion     Urinary tract infection, site not specified       Past Medical History:   Diagnosis Date     Degenerative disc disease, lumbar      Depression      Hyperlipidemia      Hypertension        Past Surgical History:   Procedure Laterality Date     ENDOMETRIAL SAMPLING (BIOPSY)  12/27/2001     FOOT SURGERY Bilateral 1973     HC EXCISE CUTANEOUS NEUROMA  11/05/1997    Times 3      TUBAL LIGATION Bilateral 04/1989       Family History   Problem Relation Age of Onset     Diabetes Father      Coronary Artery Disease Father      Hypertension Father      Hyperlipidemia Father      Chronic Obstructive Pulmonary Disease Father      Diabetes Mother      Hypertension Mother      Hyperlipidemia Mother      Diabetes Sister      Hypertension Sister      Hyperlipidemia Sister      Prostate Cancer Paternal Grandfather      Neurologic Disorder Son         Cerebral palsy     Cancer - colorectal Father        Social History     Tobacco Use     Smoking status: Former Smoker     Smokeless tobacco: Never Used   Substance Use Topics     Alcohol use: Yes     Comment: Social       Current Outpatient Medications   Medication Sig Dispense Refill     Cyclobenzaprine HCl (FLEXERIL PO) Take 10 mg by mouth every 8 hours as needed for muscle spasms       diclofenac (VOLTAREN) 1 % topical gel Place 2 g onto the skin 4 times daily 100 g 1     HYDROcodone-acetaminophen (NORCO) 5-325 MG tablet 1 tab HS prn 10 tablet 0     lisinopril-hydrochlorothiazide (PRINZIDE/ZESTORETIC) 20-25 MG per tablet Take 1 tablet by mouth daily 90 tablet 0     LORazepam (ATIVAN PO) Take 0.5 mg by mouth 2 times daily       meloxicam (MOBIC) 15 MG tablet 15 mg daily  3     PRAVASTATIN SODIUM PO Take 40 mg by mouth daily        traMADol  "(ULTRAM) 50 MG tablet TK 1 T PO Q 4 TO 6 HOURS PRN FOR PAIN  2       No Known Allergies    REVIEW OF SYSTEMS  Skin: as above  Eyes: glasses  Ears/Nose/Throat: negative  Respiratory: No shortness of breath, dyspnea on exertion, cough, or hemoptysis  Cardiovascular: negative  Gastrointestinal: negative  Genitourinary: negative  Musculoskeletal: back pain, neck pain, arthritis, joint pain and foot pain, currently on crutches, non weight bearing on right foot  Neurologic: negative  Psychiatric: anxiety  Hematologic/Lymphatic/Immunologic: negative  Endocrine: negative    OBJECTIVE:  Vital signs:  Temp: 98.3  F (36.8  C) Temp src: Tympanic BP: (!) 140/78 Pulse: 76   Resp: 16            Estimated body mass index is 27.32 kg/m  as calculated from the following:    Height as of 10/10/19: 1.651 m (5' 5\").    Weight as of 10/10/19: 74.5 kg (164 lb 3.2 oz).  Constitutional: healthy, alert and no distress  Head: Normocephalic. No masses, lesions, tenderness or abnormalities  Cardiovascular: RRR. No murmurs, clicks gallops or rub  Respiratory:  Good diaphragmatic excursion. Lungs clear  Gastrointestinal: Abdomen soft, non-tender. BS normal. No masses, organomegaly  : Deferred  Musculoskeletal: extremities normal- no gross deformities noted and normal muscle tone  Skin:        Wound description:  Type of Wound- trauma; Location- right plantar foot, first metatarsal, Drainage amount-moderate, Drainage color-pink and tan,  Odor- none; Wound bed-100% pink after debridement, Surrounding skin-intact, but newly epithelialized and healthy appearing where Dr. Peters debrided (see photo) there is actually only one small area that is open.  It is exquisitely tender       Measurements: 0.5 X 0.3 cm       Dressing change:  Wound cleansed with soap and water, dressed with honey alginate, covered with plain foam and secured with Medipore tape.    Neurologic: Gait normal. Sensation grossly WNL.  Psychiatric: mentation appears normal and " affect normal/bright    Conservative Sharp Debridement -  Previous consent signed and in Medical Record. Verified name,  and site as part of time out done prior to procedure. Patient was informed of the risks and benefits and consented to the procedure.  Two identifiers were used and a time out was completed.    Conservative debridement was completed with a sterile #15 scalpel  to remove non viable tan tissue over the wound bed.   (< 20 sq cm)   Patient response: good  Pain: area is very tender, treated with lidocaine cream prior to debridement  Bleeding:scant amount of bleeding    THERAPY GOAL: Heal to closure    ASSESSMENT / PLAN:  Daily dressing changes with honey alginate and foam.    Follow-up in one week or as needed for acute concerns    Tracy Taylor  CNP, CWOCN  Wound Care

## 2020-01-30 NOTE — PATIENT INSTRUCTIONS
Wound Care Instructions:  1) Wash your hands and work space  2) Gather all your supplies: warm water and soap, clean washcloth and towel, honey alginate, tape, foam  3) Cleanse wound with soap and water, rinse and gently dry  4) Dress wound with a small piece of honey in the open area only, cover with a piece of foam and secure with tape   5) Change dressing daily  6) Dispose of all dirty supplies  7) Wash hands and equipment    Please report any increase in pain, fevers, chills, changes in the drainage odor.   Please call if you have any questions or concerns or if any problems develop.   Follow up in one week

## 2020-02-02 LAB
BACTERIA SPEC CULT: ABNORMAL
SPECIMEN SOURCE: ABNORMAL

## 2020-02-03 LAB
BACTERIA SPEC CULT: NORMAL
SPECIMEN SOURCE: NORMAL

## 2020-10-06 ENCOUNTER — NURSE TRIAGE (OUTPATIENT)
Dept: FAMILY MEDICINE | Facility: OTHER | Age: 60
End: 2020-10-06

## 2020-10-06 DIAGNOSIS — Z20.822 COVID-19 RULED OUT: Primary | ICD-10-CM

## 2020-10-06 NOTE — TELEPHONE ENCOUNTER
"Headache, sore throat x 2 days, pt in need of testing prior to returning to work.     No fever.     Curbside Testing:    Next 5 appointments (look out 90 days)    Oct 07, 2020  9:15 AM  (Arrive by 9:00 AM)  SHORT with HC COLLECTION [facility] [place] ([facility] [place] ) [id number]  [place] [address]  [phone]        Discharge Instructions for COVID-19 Patients  You have--or may have--COVID-19. Please follow the instructions listed below.   If you have a weakened immune system, discuss with your doctor any other actions you need to take.  How can I protect others?  If you have symptoms (fever, cough, body aches or trouble breathing):    Stay home and away from others (self-isolate) until:  ? At least 10 days have passed since your symptoms started, And   ? You've had no fever--and no medicine that reduces fever--for 1 full day (24 hours), And    ? Your other symptoms have resolved (gotten better).  If you don't show symptoms, but testing showed that you have COVID-19:    Stay home and away from others (self-isolate). Follow the tips under \"How do I self-isolate?\" below for 10 days (20 days if you have a weak immune system).    You don't need to be retested for COVID-19 before going back to school or work. As long as you're fever-free and feeling better, you can go back to school, work and other activities after waiting the 10 or 20 days.   How do I self-isolate?    Stay in your own room, even for meals. Use your own bathroom if you can.    Stay away from others in your home. No hugging, kissing or shaking hands. No visitors.    Don't go to work, school or anywhere else.    Clean \"high touch\" surfaces often (doorknobs, counters, handles). Use household cleaning spray or wipes. You'll find a full list of  on the EPA website: www.epa.gov/pesticide-registration/list-n-disinfectants-use-against-sars-cov-2.    Cover your mouth and nose with a mask or other face " covering to avoid spreading germs.    Wash your hands and face often. Use soap and water.    Caregivers in these groups are at risk for severe illness due to COVID-19:  ? People 65 years and older  ? People who live in a nursing home or long-term care facility  ? People with chronic disease (lung, heart, cancer, diabetes, kidney, liver, immunologic)  ? People who have a weakened immune system, including those who:    Are in cancer treatment    Take medicine that weakens the immune system, such as corticosteroids    Had a bone marrow or organ transplant    Have an immune deficiency    Have poorly controlled HIV or AIDS    Are obese (body mass index of 40 or higher)    Smoke regularly    Caregivers should wear gloves while washing dishes, handling laundry and cleaning bedrooms and bathrooms.    Use caution when washing and drying laundry: Don't shake dirty laundry and use the warmest water setting that you can.    For more tips on managing your health at home, go to www.cdc.gov/coronavirus/2019-ncov/downloads/10Things.pdf.  How can I take care of myself at home?  1. Get lots of rest. Drink extra fluids (unless a doctor has told you not to).    2. Take Tylenol (acetaminophen) for fever or pain. If you have liver or kidney problems, ask your family doctor if it's okay to take Tylenol.     Adults can take either:  ? 650 mg (two 325 mg pills) every 4 to 6 hours, or   ? 1,000 mg (two 500 mg pills) every 8 hours as needed.  ? Note: Don't take more than 3,000 mg in one day. Acetaminophen is found in many medicines (both prescribed and over-the-counter medicines). Read all labels to be sure you don't take too much.   For children, check the Tylenol bottle for the right dose. The dose is based on the child's age or weight.  3. If you have other health problems (like cancer, heart failure, an organ transplant or severe kidney disease): Call your specialty clinic if you don't feel better in the next 2 days.    4. Know when to  call 911. Emergency warning signs include:  ? Trouble breathing or shortness of breath  ? Pain or pressure in the chest that doesn't go away  ? Feeling confused like you haven't felt before, or not being able to wake up  ? Bluish-colored lips or face    5. Your doctor may have prescribed a blood thinner medicine. Follow their instructions.  Where can I get more information?    [facility] - About COVID-19: [email or web address]    CDC - What to Do If You're Sick: www.cdc.gov/coronavirus/2019-ncov/about/steps-when-sick.html    CDC - Ending Home Isolation: www.cdc.gov/coronavirus/2019-ncov/hcp/disposition-in-home-patients.html    CDC - Caring for Someone: www.cdc.gov/coronavirus/2019-ncov/if-you-are-sick/care-for-someone.html    [facility] - Interim Guidance for Hospital Discharge to Home: www.[facility].[place]/diseases/coronavirus/hcp/hospdischarge.pdf    [facility] clinical trials (COVID-19 research studies): clinicalaffairs.[facility]-clinical-trials    Below are the COVID-19 hotlines at the [place] [facility] ([facility]). Interpreters are available.  ? For health questions: Call 693-519-7594 or 1-484.660.2695 (7 a.m. to 7 p.m.)  ? For questions about schools and childcare: Call 905-355-4506 or 1-726.353.9112 (7 a.m. to 7 p.m.)    For informational purposes only. Not to replace the advice of your health care provider. Clinically reviewed by the Infection Prevention Team. Copyright   2020 [facility]. All rights reserved. [employer] [id number] - REV 08/04/20.      Reason for Disposition    COVID-19 Home Isolation, questions about    Additional Information    Negative: SEVERE difficulty breathing (e.g., struggling for each breath, speaks in single words)    Negative: Difficult to awaken or acting confused (e.g., disoriented, slurred speech)    Negative: Bluish (or gray) lips or face now    Negative: Shock suspected (e.g., cold/pale/clammy skin, too weak to stand, low BP, rapid  pulse)    Negative: Sounds like a life-threatening emergency to the triager    Negative: [1] COVID-19 exposure AND [2] no symptoms    Negative: COVID-19 and Breastfeeding, questions about    Negative: [1] Adult with possible COVID-19 symptoms AND [2] triager concerned about severity of symptoms or other causes    Negative: SEVERE or constant chest pain or pressure (Exception: mild central chest pain, present only when coughing)    Negative: MODERATE difficulty breathing (e.g., speaks in phrases, SOB even at rest, pulse 100-120)    Negative: Patient sounds very sick or weak to the triager    Negative: MILD difficulty breathing (e.g., minimal/no SOB at rest, SOB with walking, pulse <100)    Negative: Chest pain or pressure    Negative: Fever > 103 F (39.4 C)    Negative: [1] Fever > 101 F (38.3 C) AND [2] age > 60    Negative: [1] Fever > 100.0 F (37.8 C) AND [2] bedridden (e.g., nursing home patient, CVA, chronic illness, recovering from surgery)    Negative: HIGH RISK patient (e.g., age > 64 years, diabetes, heart or lung disease, weak immune system) (Exception: Has already been evaluated by healthcare provider and has no new or worsening symptoms)    Negative: Fever present > 3 days (72 hours)    Negative: [1] Fever returns after gone for over 24 hours AND [2] symptoms worse or not improved    Negative: [1] Continuous (nonstop) coughing interferes with work or school AND [2] no improvement using cough treatment per protocol    Negative: [1] COVID-19 infection suspected by caller or triager AND [2] mild symptoms (cough, fever, or others) AND [3] no complications or SOB    Negative: Cough present > 3 weeks    Negative: [1] COVID-19 diagnosed by positive lab test AND [2] mild symptoms (e.g., cough, fever, others) AND [3] no complications or SOB    Negative: [1] COVID-19 diagnosed by HCP (doctor, NP or PA) AND [2] mild symptoms (e.g., cough, fever, others) AND [3] no complications or SOB    Answer Assessment - Initial  "Assessment Questions  1. COVID-19 DIAGNOSIS: \"Who made your Coronavirus (COVID-19) diagnosis?\" \"Was it confirmed by a positive lab test?\" If not diagnosed by a HCP, ask \"Are there lots of cases (community spread) where you live?\" (See public health department website, if unsure)      Pt  Has not been tested for covid 19    2. ONSET: \"When did the COVID-19 symptoms start?\"       2 days ago (10/4/20)    3. WORST SYMPTOM: \"What is your worst symptom?\" (e.g., cough, fever, shortness of breath, muscle aches)      Sore throat    4. COUGH: \"Do you have a cough?\" If so, ask: \"How bad is the cough?\"        Yes, dry cough on and off.      5. FEVER: \"Do you have a fever?\" If so, ask: \"What is your temperature, how was it measured, and when did it start?\"      No     6. RESPIRATORY STATUS: \"Describe your breathing?\" (e.g., shortness of breath, wheezing, unable to speak)       No     7. BETTER-SAME-WORSE: \"Are you getting better, staying the same or getting worse compared to yesterday?\"  If getting worse, ask, \"In what way?\"      Same    8. HIGH RISK DISEASE: \"Do you have any chronic medical problems?\" (e.g., asthma, heart or lung disease, weak immune system, etc.)      HTN     9. PREGNANCY: \"Is there any chance you are pregnant?\" \"When was your last menstrual period?\"      No     10. OTHER SYMPTOMS: \"Do you have any other symptoms?\"  (e.g., chills, fatigue, headache, loss of smell or taste, muscle pain, sore throat)        Headache, sore throat and cough.    Protocols used: CORONAVIRUS (COVID-19) DIAGNOSED OR [id number] 8.4.20      "

## 2020-10-07 ENCOUNTER — OFFICE VISIT (OUTPATIENT)
Dept: FAMILY MEDICINE | Facility: OTHER | Age: 60
End: 2020-10-07
Attending: FAMILY MEDICINE
Payer: COMMERCIAL

## 2020-10-07 DIAGNOSIS — Z20.822 COVID-19 RULED OUT: ICD-10-CM

## 2020-10-07 PROCEDURE — U0003 INFECTIOUS AGENT DETECTION BY NUCLEIC ACID (DNA OR RNA); SEVERE ACUTE RESPIRATORY SYNDROME CORONAVIRUS 2 (SARS-COV-2) (CORONAVIRUS DISEASE [COVID-19]), AMPLIFIED PROBE TECHNIQUE, MAKING USE OF HIGH THROUGHPUT TECHNOLOGIES AS DESCRIBED BY CMS-2020-01-R: HCPCS | Performed by: FAMILY MEDICINE

## 2020-10-07 NOTE — PROGRESS NOTES
COVID swab completed    Headache, sore throat x 2 days, pt in need of testing prior to returning to work-[employer]

## 2020-10-08 LAB
SARS-COV-2 RNA SPEC QL NAA+PROBE: NOT DETECTED
SPECIMEN SOURCE: NORMAL

## 2020-12-20 ENCOUNTER — HEALTH MAINTENANCE LETTER (OUTPATIENT)
Age: 60
End: 2020-12-20

## 2020-12-28 ENCOUNTER — TRANSFERRED RECORDS (OUTPATIENT)
Dept: HEALTH INFORMATION MANAGEMENT | Facility: CLINIC | Age: 60
End: 2020-12-28

## 2021-03-09 ENCOUNTER — IMMUNIZATION (OUTPATIENT)
Dept: FAMILY MEDICINE | Facility: OTHER | Age: 61
End: 2021-03-09
Attending: FAMILY MEDICINE
Payer: COMMERCIAL

## 2021-03-09 PROCEDURE — 91303 PR COVID VAC JANSSEN AD26 0.5ML: CPT

## 2021-03-09 PROCEDURE — 0031A PR COVID VAC JANSSEN AD26 0.5ML: CPT

## 2021-08-17 ENCOUNTER — OFFICE VISIT (OUTPATIENT)
Dept: NEUROLOGY | Facility: OTHER | Age: 61
End: 2021-08-17
Attending: PSYCHIATRY & NEUROLOGY
Payer: COMMERCIAL

## 2021-08-17 VITALS
OXYGEN SATURATION: 98 % | BODY MASS INDEX: 26.66 KG/M2 | RESPIRATION RATE: 16 BRPM | SYSTOLIC BLOOD PRESSURE: 128 MMHG | TEMPERATURE: 98.3 F | DIASTOLIC BLOOD PRESSURE: 78 MMHG | HEART RATE: 81 BPM | WEIGHT: 160 LBS | HEIGHT: 65 IN

## 2021-08-17 DIAGNOSIS — G56.01 CARPAL TUNNEL SYNDROME OF RIGHT WRIST: Primary | ICD-10-CM

## 2021-08-17 PROCEDURE — 95911 NRV CNDJ TEST 9-10 STUDIES: CPT | Performed by: PSYCHIATRY & NEUROLOGY

## 2021-08-17 PROCEDURE — 99204 OFFICE O/P NEW MOD 45 MIN: CPT | Mod: 25 | Performed by: PSYCHIATRY & NEUROLOGY

## 2021-08-17 PROCEDURE — 95886 MUSC TEST DONE W/N TEST COMP: CPT | Performed by: PSYCHIATRY & NEUROLOGY

## 2021-08-17 RX ORDER — ROSUVASTATIN CALCIUM 20 MG/1
20 TABLET, COATED ORAL DAILY
COMMUNITY
Start: 2021-08-03 | End: 2023-07-10

## 2021-08-17 RX ORDER — LORAZEPAM 0.5 MG/1
1 TABLET ORAL
COMMUNITY
Start: 2021-07-08 | End: 2023-03-22

## 2021-08-17 RX ORDER — ALLOPURINOL 300 MG/1
1 TABLET ORAL DAILY
COMMUNITY
Start: 2021-08-03 | End: 2023-06-22

## 2021-08-17 RX ORDER — CYCLOBENZAPRINE HCL 10 MG
TABLET ORAL
COMMUNITY
Start: 2021-07-08 | End: 2023-07-23

## 2021-08-17 ASSESSMENT — MIFFLIN-ST. JEOR: SCORE: 1301.64

## 2021-08-17 ASSESSMENT — PAIN SCALES - GENERAL: PAINLEVEL: MODERATE PAIN (5)

## 2021-08-17 NOTE — PROGRESS NOTES
Visit Date: 08/17/2021    REFERRING PHYSICIAN:  Nurse practitioner, Arlin Garsia.    HISTORY OF PRESENT ILLNESS:  The patient is a 60-year-old  who relates about a year history of pain and paresthesia in the right hand, much more than the left.  Symptoms are not precisely localized.  She thinks there may be some loss of  strength.  There was no specific injury.    PAST MEDICAL HISTORY:  Significant for obesity, but negative for diabetes.    REVIEW OF SYSTEMS:  A 10-system review of systems is noncontributory.    FAMILY HISTORY:  Positive for carpal tunnel syndrome diagnosed for the patient's sister.    SOCIAL HISTORY:  The patient is a former smoker.  She works as a .    PHYSICAL EXAMINATION:  The patient is overweight and grossly deconditioned.  There is prominent weakness on the right for the abductors of the thumb with grossly normal strength for this muscle group. On the left she has normal strength bilaterally for the interossei, finger extensors and flexors, biceps and triceps.  Reflexes are hypoactive, but symmetric in the upper extremities.  The gait is normal.    NERVE CONDUCTION STUDIES:  Motor nerve conduction testing was performed for the right median and ulnar nerves.  The median nerves showed severe latency prolongation, conduction block and slowing at the wrist.  The ulnar study was normal.  H reflex latencies were within normal limits for both nerves.    Orthodromic mixed conduction studies were performed for the right median and ulnar nerves.  Antidromic sensory nerve conduction studies were performed for the second digital, fifth digital and radial nerves.  Waveforms were absent for the median and second digital nerves.  For the other tested nerves, the latencies, amplitudes and conduction velocities were normal.    MONOPOLAR EMG NEEDLE EXAMINATION:  Monopolar needle exam was performed on the right side for the first dorsal interosseous, extensor digitorum communis,  flexor carpi radialis, triceps and brachioradialis.  All of the tested muscles showed normal insertional activity.  Motor units were normal in size with normal recruitment and interference.    IMPRESSION:  The patient has severe right carpal tunnel syndrome, but otherwise looks to be neurologically healthy, the degree of conduction block is great enough that resolution of the carpal tunnel syndrome cannot be expected with nonsurgical treatment.  This was discussed with the patient.    Yury Lay MD        D: 2021   T: 2021   MT: [id number]    Name:     KWAME PIPER  MRN:      [id number]        Account:    [id number]   :      1960           Visit Date: 2021     Document: [id number]

## 2021-08-17 NOTE — NURSING NOTE
Patient presenting for an EMG for her right upper extremity.  Medication Reconciliation: complete  Advanced Care Directive Reviewed.    Amy Palmer LPN  8/17/2021 10:55 AM

## 2021-10-03 ENCOUNTER — HEALTH MAINTENANCE LETTER (OUTPATIENT)
Age: 61
End: 2021-10-03

## 2022-01-22 ENCOUNTER — HEALTH MAINTENANCE LETTER (OUTPATIENT)
Age: 62
End: 2022-01-22

## 2022-04-07 ENCOUNTER — TRANSFERRED RECORDS (OUTPATIENT)
Dept: HEALTH INFORMATION MANAGEMENT | Facility: CLINIC | Age: 62
End: 2022-04-07

## 2022-09-04 ENCOUNTER — HEALTH MAINTENANCE LETTER (OUTPATIENT)
Age: 62
End: 2022-09-04

## 2022-10-05 ENCOUNTER — MEDICAL CORRESPONDENCE (OUTPATIENT)
Dept: NUCLEAR MEDICINE | Facility: HOSPITAL | Age: 62
End: 2022-10-05

## 2022-11-30 ENCOUNTER — TRANSFERRED RECORDS (OUTPATIENT)
Dept: HEALTH INFORMATION MANAGEMENT | Facility: CLINIC | Age: 62
End: 2022-11-30

## 2022-12-30 ENCOUNTER — TRANSFERRED RECORDS (OUTPATIENT)
Dept: HEALTH INFORMATION MANAGEMENT | Facility: CLINIC | Age: 62
End: 2022-12-30

## 2022-12-30 LAB — EJECTION FRACTION: 77 %

## 2023-01-15 ENCOUNTER — HEALTH MAINTENANCE LETTER (OUTPATIENT)
Age: 63
End: 2023-01-15

## 2023-03-13 ENCOUNTER — OFFICE VISIT (OUTPATIENT)
Dept: FAMILY MEDICINE | Facility: OTHER | Age: 63
End: 2023-03-13
Attending: STUDENT IN AN ORGANIZED HEALTH CARE EDUCATION/TRAINING PROGRAM
Payer: COMMERCIAL

## 2023-03-13 VITALS
HEART RATE: 88 BPM | RESPIRATION RATE: 18 BRPM | BODY MASS INDEX: 28.74 KG/M2 | DIASTOLIC BLOOD PRESSURE: 60 MMHG | WEIGHT: 172.7 LBS | TEMPERATURE: 98.2 F | SYSTOLIC BLOOD PRESSURE: 108 MMHG | OXYGEN SATURATION: 98 %

## 2023-03-13 DIAGNOSIS — I10 ESSENTIAL HYPERTENSION: ICD-10-CM

## 2023-03-13 DIAGNOSIS — F33.41 RECURRENT MAJOR DEPRESSIVE DISORDER, IN PARTIAL REMISSION (H): ICD-10-CM

## 2023-03-13 DIAGNOSIS — Z76.89 ENCOUNTER TO ESTABLISH CARE: Primary | ICD-10-CM

## 2023-03-13 DIAGNOSIS — Z87.39 HX OF GOUT: ICD-10-CM

## 2023-03-13 DIAGNOSIS — E78.5 HYPERLIPIDEMIA LDL GOAL <100: ICD-10-CM

## 2023-03-13 DIAGNOSIS — F41.9 ANXIETY: ICD-10-CM

## 2023-03-13 DIAGNOSIS — K21.00 GASTROESOPHAGEAL REFLUX DISEASE WITH ESOPHAGITIS WITHOUT HEMORRHAGE: ICD-10-CM

## 2023-03-13 PROBLEM — M10.079 IDIOPATHIC GOUT OF FOOT: Status: ACTIVE | Noted: 2023-03-13

## 2023-03-13 PROCEDURE — 99203 OFFICE O/P NEW LOW 30 MIN: CPT | Performed by: STUDENT IN AN ORGANIZED HEALTH CARE EDUCATION/TRAINING PROGRAM

## 2023-03-13 RX ORDER — OMEPRAZOLE 40 MG/1
40 CAPSULE, DELAYED RELEASE ORAL DAILY
COMMUNITY
Start: 2023-02-28 | End: 2023-05-26

## 2023-03-13 ASSESSMENT — ANXIETY QUESTIONNAIRES
GAD7 TOTAL SCORE: 9
GAD7 TOTAL SCORE: 9
6. BECOMING EASILY ANNOYED OR IRRITABLE: SEVERAL DAYS
7. FEELING AFRAID AS IF SOMETHING AWFUL MIGHT HAPPEN: SEVERAL DAYS
7. FEELING AFRAID AS IF SOMETHING AWFUL MIGHT HAPPEN: SEVERAL DAYS
4. TROUBLE RELAXING: MORE THAN HALF THE DAYS
5. BEING SO RESTLESS THAT IT IS HARD TO SIT STILL: MORE THAN HALF THE DAYS
GAD7 TOTAL SCORE: 9
1. FEELING NERVOUS, ANXIOUS, OR ON EDGE: SEVERAL DAYS
2. NOT BEING ABLE TO STOP OR CONTROL WORRYING: SEVERAL DAYS
3. WORRYING TOO MUCH ABOUT DIFFERENT THINGS: SEVERAL DAYS

## 2023-03-13 ASSESSMENT — PATIENT HEALTH QUESTIONNAIRE - PHQ9
SUM OF ALL RESPONSES TO PHQ QUESTIONS 1-9: 8
SUM OF ALL RESPONSES TO PHQ QUESTIONS 1-9: 8
10. IF YOU CHECKED OFF ANY PROBLEMS, HOW DIFFICULT HAVE THESE PROBLEMS MADE IT FOR YOU TO DO YOUR WORK, TAKE CARE OF THINGS AT HOME, OR GET ALONG WITH OTHER PEOPLE: NOT DIFFICULT AT ALL

## 2023-03-13 ASSESSMENT — PAIN SCALES - GENERAL: PAINLEVEL: MODERATE PAIN (4)

## 2023-03-13 NOTE — PROGRESS NOTES
Assessment & Plan     Encounter to establish care  - Previously followed with Dr. Camargo  - CONSTANCE for outside records completed today  - Annual physical upcoming in July    Essential hypertension  Well-controlled, asymptomatic.  - Lisinopril-HCTZ 20-25 mg daily  - CMP at follow up    Recurrent major depressive disorder, in partial remission (H)  Anxiety  PHQ-9: 8, ROBY-7: 9.  Intermittent/recurrent, currently well controlled.  Does have remote history of SI with prescription drug overdose; was going through significant relationship issues at that time.  Has been on nightly lorazepam for many years; apparently has not tolerated weaning well in the past.  Set the stage that benzodiazepine monotherapy is not ideal management for anxiety/insomnia, although no changes that she has been stable on current regimen for many years.  - Consider maintenance SSRI/SNRI  - Lorazepam 0.5 mg nightly as needed  - Offer referral for therapy    Gastroesophageal reflux disease with esophagitis without hemorrhage  Controlled on PPI.  No red flag symptoms.  Has not tolerated weaning in the past.  - Prilosec 40 mg daily    Hyperlipidemia LDL goal <100  - Lipid profile at physical  - Crestor 20 mg daily    Hx of gout  No recurrence since starting on maintenance allopurinol.  - Consider checking uric acid at physical for dose titration  - LFTs at upcoming physical  - Allopurinol 300 mg    40 minutes spent on the date of the encounter doing chart review, history and exam, documentation and further activities per the note       Follow-up in about 3 months for annual physical/fasting labs.    Donaldo Seymour MD  [facility] 62 [facility], presenting for the following health issues:  Establish Care, Depression, Anxiety, and Hypertension      HPI     Establish care  -Previously followed with Dr. Camargo  -Healthcare maintenance up-to-date  -Annual physical some time in the summer    Hypertension  Follow-up    Do you check your blood pressure regularly outside of the clinic? No     Are you following a low salt diet? No    Are your blood pressures ever more than 140 on the top number (systolic) OR more than 90 on the bottom number (diastolic), for example 140/90? N/a  -No headache, dizziness, lightheadedness, chest pain, palpitations, shortness of breath, peripheral edema    Depression and Anxiety Follow-Up    How are you doing with your depression since your last visit? Non-issue, patient reports intermittent fair ups, under control currently     How are you doing with your anxiety since your last visit?  No Change - patient reports her anxiety on average is bad    Are you having other symptoms that might be associated with depression or anxiety? Yes:  Worse at night, patient continual worrying     Have you had a significant life event? No     Do you have any concerns with your use of alcohol or other drugs? No   -Has been on low-dose lorazepam nightly for many years  -Has tried to wean off in the past with significant exacerbation of her anxiety  -Mood has been very stable on current regimen  -Dr. Camargo has been managing her medications    Social History     Tobacco Use     Smoking status: Former     Smokeless tobacco: Never   Substance Use Topics     Alcohol use: Yes     Comment: Social     Drug use: No     PHQ 3/9/2017 3/13/2023   PHQ-9 Total Score 16 8   Q9: Thoughts of better off dead/self-harm past 2 weeks Several days Not at all     ROBY-7 SCORE 3/9/2017 3/13/2023   Total Score - 9 (mild anxiety)   Total Score 19 9     Last PHQ-9 3/13/2023   1.  Little interest or pleasure in doing things 2   2.  Feeling down, depressed, or hopeless 1   3.  Trouble falling or staying asleep, or sleeping too much 0   4.  Feeling tired or having little energy 1   5.  Poor appetite or overeating 1   6.  Feeling bad about yourself 1   7.  Trouble concentrating 1   8.  Moving slowly or restless 1   Q9: Thoughts of better  off dead/self-harm past 2 weeks 0   PHQ-9 Total Score 8   Difficulty at work, home, or with people -     ROBY-7  3/13/2023   1. Feeling nervous, anxious, or on edge 1   2. Not being able to stop or control worrying 1   3. Worrying too much about different things 1   4. Trouble relaxing 2   5. Being so restless that it is hard to sit still 2   6. Becoming easily annoyed or irritable 1   7. Feeling afraid, as if something awful might happen 1   ROBY-7 Total Score 9   If you checked any problems, how difficult have they made it for you to do your work, take care of things at home, or get along with other people? -     Answers for HPI/ROS submitted by the patient on 3/13/2023  If you checked off any problems, how difficult have these problems made it for you to do your work, take care of things at home, or get along with other people?: Not difficult at all  PHQ9 TOTAL SCORE: 8  ROBY 7 TOTAL SCORE: 9    Suicide Assessment Five-step Evaluation and Treatment (SAFE-T)      How many servings of fruits and vegetables do you eat daily?  0-1    On average, how many sweetened beverages do you drink each day (Examples: soda, juice, sweet tea, etc.  Do NOT count diet or artificially sweetened beverages)?   0    How many days per week do you exercise enough to make your heart beat faster? 3 or less    How many minutes a day do you exercise enough to make your heart beat faster? 9 or less    How many days per week do you miss taking your medication? 0    Gout  -History of gout flare bilateral feet; undiagnosed for a while, treated for cellulitis, eventually improved with treatment for gout  -No symptoms since starting allopurinol  -Unsure last uric acid check    GERD  -Essentially PPI dependent  -Has tried many in the past with severe rebound of symptoms  -Asymptomatic on PPI therapy    Review of Systems   Constitutional, HEENT, cardiovascular, pulmonary, gi and gu systems are negative, except as otherwise noted.      Objective    BP  108/60   Pulse 88   Temp 98.2  F (36.8  C)   Resp 18   Wt 78.3 kg (172 lb 11.2 oz)   SpO2 98%   BMI 28.74 kg/m    Body mass index is 28.74 kg/m .  Physical Exam  Vitals reviewed.   Constitutional:       General: She is not in acute distress.     Appearance: Normal appearance. She is not ill-appearing or toxic-appearing.   HENT:      Head: Normocephalic and atraumatic.   Cardiovascular:      Rate and Rhythm: Normal rate and regular rhythm.      Heart sounds: No murmur heard.  Pulmonary:      Effort: Pulmonary effort is normal.      Breath sounds: Normal breath sounds. No stridor. No wheezing or rhonchi.   Musculoskeletal:         General: Normal range of motion.   Skin:     General: Skin is warm and dry.   Neurological:      General: No focal deficit present.      Mental Status: She is alert and oriented to person, place, and time.   Psychiatric:         Mood and Affect: Mood normal.         Behavior: Behavior normal.

## 2023-03-15 ENCOUNTER — ANESTHESIA EVENT (OUTPATIENT)
Dept: SURGERY | Facility: HOSPITAL | Age: 63
End: 2023-03-15
Payer: COMMERCIAL

## 2023-03-15 ASSESSMENT — LIFESTYLE VARIABLES: TOBACCO_USE: 1

## 2023-03-15 NOTE — ANESTHESIA PREPROCEDURE EVALUATION
Anesthesia Pre-Procedure Evaluation    Patient: Elsi Krause   MRN: [id number] : 1960        Procedure : Procedure(s):  COLONOSCOPY          Past Medical History:   Diagnosis Date     ACP (advance care planning) 2016    Advance Care Planning 2016: ACP Review of Chart / Resources Provided:  Reviewed chart for advance care plan.  Elsi Krause has no plan or code status on file. Discussed available resources and provided with information. Confirmed code status reflects current choices pending further ACP discussions.  Confirmed/documented legally designated decision makers.  Added by CASTILLO REYNA        Degenerative disc disease, lumbar      Depression      Drug overdose 2015     Hyperlipidemia      Hypertension      Leiomyoma of uterus, unspecified 2006    IMO Update 10/11     Osteoarthritis of ankle 2006    IMO Update 10/11     Urinary tract infection, site not specified 2002    Also 01, 04 IMO Update 10/11      Past Surgical History:   Procedure Laterality Date     CARPAL TUNNEL RELEASE RT/LT Right      CERVICAL FUSION       ENDOMETRIAL SAMPLING (BIOPSY)  2001     FOOT SURGERY Bilateral 1973     HC EXCISE CUTANEOUS NEUROMA  1997    Times 3      TUBAL LIGATION Bilateral 1989      No Known Allergies   Social History     Tobacco Use     Smoking status: Former     Smokeless tobacco: Never   Substance Use Topics     Alcohol use: Yes     Comment: Social      Wt Readings from Last 1 Encounters:   23 78.3 kg (172 lb 11.2 oz)        Anesthesia Evaluation   Pt has had prior anesthetic. Type: General.        ROS/MED HX  ENT/Pulmonary:     (+) tobacco use, Past use, 2 packs/day, 10  Pack-Year Hx,      Neurologic:  - neg neurologic ROS     Cardiovascular: Comment: Lisinopril dose 3/22/2023    (+) Dyslipidemia hypertension-----    METS/Exercise Tolerance: >4 METS    Hematologic:  - neg hematologic  ROS     Musculoskeletal: Comment: S/o cervical  fusion  (+) arthritis (gout),     GI/Hepatic: Comment: Last acid reflux episode yesteday morning but denies any symptoms today    (+) GERD, bowel prep,     Renal/Genitourinary:  - neg Renal ROS     Endo:  - neg endo ROS     Psychiatric/Substance Use:     (+) psychiatric history other (comment), depression and anxiety (drug overdose)     Infectious Disease:  - neg infectious disease ROS     Malignancy:  - neg malignancy ROS     Other:            Physical Exam    Airway        Mallampati: III   TM distance: > 3 FB   Neck ROM: full   Mouth opening: > 3 cm    Respiratory Devices and Support         Dental       (+) Minor Abnormalities - some fillings, tiny chips      Cardiovascular   cardiovascular exam normal          Pulmonary           (+) decreased breath sounds           OUTSIDE LABS:  CBC:   Lab Results   Component Value Date    WBC 5.8 01/27/2020    WBC 3.9 (L) 04/21/2015    HGB 12.1 01/27/2020    HGB 10.0 (L) 04/21/2015    HCT 35.1 01/27/2020    HCT 29.8 (L) 04/21/2015     01/27/2020     04/21/2015     BMP:   Lab Results   Component Value Date     04/21/2015     04/20/2015    POTASSIUM 3.7 04/21/2015    POTASSIUM 3.7 04/20/2015    CHLORIDE 108 04/21/2015    CHLORIDE 101 04/20/2015    CO2 28 04/21/2015    CO2 26 04/20/2015    BUN 13 04/21/2015    BUN 24 04/20/2015    CR 0.69 04/21/2015    CR 0.71 04/20/2015    GLC 91 04/21/2015     (H) 04/20/2015     COAGS: No results found for: PTT, INR, FIBR  POC: No results found for: BGM, HCG, HCGS  HEPATIC:   Lab Results   Component Value Date    ALBUMIN 4.4 04/20/2015    PROTTOTAL 7.4 04/20/2015    ALT 20 04/20/2015    AST 12 04/20/2015    ALKPHOS 78 04/20/2015    BILITOTAL 0.5 04/20/2015     OTHER:   Lab Results   Component Value Date    VALERIA 8.3 (L) 04/21/2015    CRP 4.9 01/27/2020    SED 38 (H) 01/27/2020       Anesthesia Plan    ASA Status:  3   NPO Status:  NPO Appropriate    Anesthesia Type: MAC.     - Reason for MAC: straight local not  clinically adequate              Consents    Anesthesia Plan(s) and associated risks, benefits, and realistic alternatives discussed. Questions answered and patient/representative(s) expressed understanding.     - Discussed: Risks, Benefits and Alternatives for BOTH SEDATION and the PROCEDURE were discussed     - Discussed with:  Patient      - Extended Intubation/Ventilatory Support Discussed: No.      - Patient is DNR/DNI Status: No    Use of blood products discussed: No .     Postoperative Care            Comments:    Other Comments: Kaczor note includes history and physical exam, no mention of procedure  Updated H&P needed            RAMA Lloyd CRNA

## 2023-03-17 RX ORDER — COLCHICINE 0.6 MG/1
0.6 CAPSULE ORAL DAILY
COMMUNITY

## 2023-03-20 NOTE — TELEPHONE ENCOUNTER
Ativan       Last Written Prescription Date:  7/8/21  Last Fill Quantity: 28,   # refills: 0  Last Office Visit: 3/13/23  Future Office visit:

## 2023-03-22 ENCOUNTER — MYC REFILL (OUTPATIENT)
Dept: FAMILY MEDICINE | Facility: OTHER | Age: 63
End: 2023-03-22

## 2023-03-22 DIAGNOSIS — F41.9 ANXIETY: Primary | ICD-10-CM

## 2023-03-22 RX ORDER — LORAZEPAM 0.5 MG/1
0.5 TABLET ORAL
Qty: 28 TABLET | Refills: 0 | OUTPATIENT
Start: 2023-03-22

## 2023-03-22 RX ORDER — LORAZEPAM 0.5 MG/1
0.5 TABLET ORAL
Qty: 28 TABLET | Refills: 0 | Status: SHIPPED | OUTPATIENT
Start: 2023-03-22 | End: 2023-04-17

## 2023-03-22 NOTE — TELEPHONE ENCOUNTER
ativan      Last Written Prescription Date:  8/17/2021  Last Fill Quantity: na,   # refills: na    Last Office Visit: 3/13/2023  Future Office visit:       Routing refill request to provider for review/approval because:

## 2023-03-23 ENCOUNTER — HOSPITAL ENCOUNTER (OUTPATIENT)
Facility: HOSPITAL | Age: 63
Discharge: HOME | End: 2023-03-23
Attending: INTERNAL MEDICINE | Admitting: INTERNAL MEDICINE
Payer: COMMERCIAL

## 2023-03-23 ENCOUNTER — ANESTHESIA (OUTPATIENT)
Dept: SURGERY | Facility: HOSPITAL | Age: 63
End: 2023-03-23
Payer: COMMERCIAL

## 2023-03-23 VITALS
SYSTOLIC BLOOD PRESSURE: 118 MMHG | OXYGEN SATURATION: 98 % | HEART RATE: 76 BPM | RESPIRATION RATE: 14 BRPM | BODY MASS INDEX: 28.68 KG/M2 | TEMPERATURE: 97.5 F | HEIGHT: 64 IN | DIASTOLIC BLOOD PRESSURE: 75 MMHG | WEIGHT: 168 LBS

## 2023-03-23 PROCEDURE — 370N000017 HC ANESTHESIA TECHNICAL FEE, PER MIN: Performed by: INTERNAL MEDICINE

## 2023-03-23 PROCEDURE — 250N000009 HC RX 250

## 2023-03-23 PROCEDURE — 360N000075 HC SURGERY LEVEL 2, PER MIN: Performed by: INTERNAL MEDICINE

## 2023-03-23 PROCEDURE — 710N000012 HC RECOVERY PHASE 2, PER MINUTE: Performed by: INTERNAL MEDICINE

## 2023-03-23 PROCEDURE — 250N000011 HC RX IP 250 OP 636

## 2023-03-23 PROCEDURE — 45378 DIAGNOSTIC COLONOSCOPY: CPT | Performed by: NURSE ANESTHETIST, CERTIFIED REGISTERED

## 2023-03-23 PROCEDURE — 999N000141 HC STATISTIC PRE-PROCEDURE NURSING ASSESSMENT: Performed by: INTERNAL MEDICINE

## 2023-03-23 PROCEDURE — 258N000003 HC RX IP 258 OP 636: Performed by: NURSE ANESTHETIST, CERTIFIED REGISTERED

## 2023-03-23 RX ORDER — PROPOFOL 10 MG/ML
INJECTION, EMULSION INTRAVENOUS PRN
Status: DISCONTINUED | OUTPATIENT
Start: 2023-03-23 | End: 2023-03-23

## 2023-03-23 RX ORDER — OXYCODONE HYDROCHLORIDE 5 MG/1
10 TABLET ORAL
Status: DISCONTINUED | OUTPATIENT
Start: 2023-03-23 | End: 2023-03-23 | Stop reason: HOSPADM

## 2023-03-23 RX ORDER — OXYCODONE HYDROCHLORIDE 5 MG/1
5 TABLET ORAL
Status: DISCONTINUED | OUTPATIENT
Start: 2023-03-23 | End: 2023-03-23 | Stop reason: HOSPADM

## 2023-03-23 RX ORDER — LIDOCAINE 40 MG/G
CREAM TOPICAL
Status: DISCONTINUED | OUTPATIENT
Start: 2023-03-23 | End: 2023-03-23 | Stop reason: HOSPADM

## 2023-03-23 RX ORDER — SODIUM CHLORIDE, SODIUM LACTATE, POTASSIUM CHLORIDE, CALCIUM CHLORIDE 600; 310; 30; 20 MG/100ML; MG/100ML; MG/100ML; MG/100ML
INJECTION, SOLUTION INTRAVENOUS CONTINUOUS
Status: DISCONTINUED | OUTPATIENT
Start: 2023-03-23 | End: 2023-03-23 | Stop reason: HOSPADM

## 2023-03-23 RX ORDER — LIDOCAINE HYDROCHLORIDE 20 MG/ML
INJECTION, SOLUTION INFILTRATION; PERINEURAL PRN
Status: DISCONTINUED | OUTPATIENT
Start: 2023-03-23 | End: 2023-03-23

## 2023-03-23 RX ADMIN — SODIUM CHLORIDE, POTASSIUM CHLORIDE, SODIUM LACTATE AND CALCIUM CHLORIDE: 600; 310; 30; 20 INJECTION, SOLUTION INTRAVENOUS at 12:32

## 2023-03-23 RX ADMIN — PROPOFOL 30 MG: 10 INJECTION, EMULSION INTRAVENOUS at 13:42

## 2023-03-23 RX ADMIN — PROPOFOL 30 MG: 10 INJECTION, EMULSION INTRAVENOUS at 13:40

## 2023-03-23 RX ADMIN — PROPOFOL 30 MG: 10 INJECTION, EMULSION INTRAVENOUS at 13:38

## 2023-03-23 RX ADMIN — PROPOFOL 60 MG: 10 INJECTION, EMULSION INTRAVENOUS at 13:32

## 2023-03-23 RX ADMIN — PROPOFOL 20 MG: 10 INJECTION, EMULSION INTRAVENOUS at 13:34

## 2023-03-23 RX ADMIN — LIDOCAINE HYDROCHLORIDE 60 MG: 20 INJECTION, SOLUTION INFILTRATION; PERINEURAL at 13:32

## 2023-03-23 ASSESSMENT — ACTIVITIES OF DAILY LIVING (ADL): ADLS_ACUITY_SCORE: 37

## 2023-03-23 NOTE — ANESTHESIA CARE TRANSFER NOTE
Patient: Elsi Krause    Procedure: Procedure(s):  COLONOSCOPY       Diagnosis: Family history of colon cancer [Z80.0]  Diagnosis Additional Information: No value filed.    Anesthesia Type:   MAC     Note:    Oropharynx: oropharynx clear of all foreign objects and spontaneously breathing  Level of Consciousness: awake  Oxygen Supplementation: room air    Independent Airway: airway patency satisfactory and stable  Dentition: dentition unchanged  Vital Signs Stable: post-procedure vital signs reviewed and stable  Report to RN Given: handoff report given  Patient transferred to: Phase II    Handoff Report: Identifed the Patient, Identified the Reponsible Provider, Reviewed the pertinent medical history, Discussed the surgical course, Reviewed Intra-OP anesthesia mangement and issues during anesthesia, Set expectations for post-procedure period and Allowed opportunity for questions and acknowledgement of understanding      Vitals:  Vitals Value Taken Time   BP     Temp     Pulse     Resp     SpO2         Electronically Signed By: Nazia Young  March 23, 2023  1:54 PM

## 2023-03-23 NOTE — BRIEF OP NOTE
[facility]    Brief Operative Note    Pre-operative diagnosis: Family history of colon cancer [Z80.0]  Post-operative diagnosis Mild left sided diverticulosis    Procedure: Procedure(s):  COLONOSCOPY  Surgeon: Surgeon(s) and Role:     * Jose Angel Fiore MD - Primary  Anesthesia: MAC   Estimated Blood Loss: None    Drains: None  Specimens: * No specimens in log *  Findings:   None.  Complications: None.  Implants: * No implants in log *

## 2023-03-23 NOTE — OP NOTE
Procedure Date: 2023    PRIMARY CARE PHYSICIAN:  Dr. Donaldo Seymour.    NAME OF PROCEDURE:  Colonoscopy.    PREPROCEDURE DIAGNOSIS:  Family medical history of colon cancer.    HISTORY OF PRESENT ILLNESS:  Please refer to H and P for details.    PHYSICAL EXAMINATION:  Cardiopulmonary examination unchanged from previous exam.  Please refer to H and P for details.    MEDICATIONS:  MAC per Anesthesia Service.  Monitoring parameters within normal limits throughout.    DESCRIPTION OF PROCEDURE:  After informed consent was obtained, the patient was placed in the left lateral decubitus position.  An adult video colonoscope was advanced all the way to the terminal ileum.  The terminal ileum mucosa was normal.  Copious irrigation and suctioning during the slow withdrawal did not show any further abnormalities.  There was mild left-sided diverticulosis.  A retroflexion view was within normal limits.  The air was then desufflated and scope was withdrawn fully and completely without any complications.    POSTPROCEDURE DIAGNOSIS:  Mild left-sided diverticulosis, otherwise normal colon.    RECOMMENDATION:  1.  Repeat colonoscopy in 5 years.  2.  Discharge home.  3.  High fiber diet.    Thank you for allowing us to participate in her management.    Jose Angel Fiore MD        D: 2023   T: 2023   MT: CORY    Name:     KWAME PIPER  MRN:      [id number]        Account:        [id number]   :      1960           Procedure Date: 2023     Document: [id number]    cc:  Donaldo Seymour MD

## 2023-03-23 NOTE — H&P
Pre-Endoscopy History and Physical     Elsi Krause MRN# [id number]   YOB: 1960 Age: 62 year old     Primary care provider: Donaldo Seymour  Type of Endoscopy: Colonoscopy with possible biopsy, possible polypectomy  Reason for Procedure: Family history of colon cancer - Father  Type of Anesthesia Anticipated: MAC    HPI:    Elsi is a 62 year old female who will be undergoing the above procedure.      A history and physical has been performed. The patient's medications and allergies have been reviewed. The risks and benefits of the procedure and the sedation options and risks were discussed with the patient.  All questions were answered and informed consent was obtained.      She denies a personal or family history of anesthesia complications or bleeding disorders.     Patient Active Problem List   Diagnosis     Drug overdose, multiple drugs     Osteoarthritis of both feet     Cervical radicular pain     Essential hypertension     Impaired fasting glucose     Status post cervical spinal fusion     Hx of gout     Gastroesophageal reflux disease with esophagitis without hemorrhage     Hyperlipidemia LDL goal <100     Recurrent major depressive disorder, in partial remission (H)     Anxiety        Past Medical History:   Diagnosis Date     ACP (advance care planning) 7/19/2016    Advance Care Planning 7/19/2016: ACP Review of Chart / Resources Provided:  Reviewed chart for advance care plan.  Elsi Krause has no plan or code status on file. Discussed available resources and provided with information. Confirmed code status reflects current choices pending further ACP discussions.  Confirmed/documented legally designated decision makers.  Added by CASTILLO REYNA        Degenerative disc disease, lumbar      Depression      Drug overdose 4/20/2015     Hyperlipidemia      Hypertension      Leiomyoma of uterus, unspecified 9/19/2006    IMO Update 10/11     Osteoarthritis of ankle 8/22/2006    IMO Update  10/11     Urinary tract infection, site not specified 7/22/2002    Also 9/11/01, 11/18/04 IMO Update 10/11        Past Surgical History:   Procedure Laterality Date     CARPAL TUNNEL RELEASE RT/LT Right      CERVICAL FUSION       ENDOMETRIAL SAMPLING (BIOPSY)  12/27/2001     FOOT SURGERY Bilateral 01/01/1973     HC EXCISE CUTANEOUS NEUROMA  11/05/1997    Times 3      TUBAL LIGATION Bilateral 04/01/1989       Social History     Tobacco Use     Smoking status: Former     Smokeless tobacco: Never   Substance Use Topics     Alcohol use: Yes     Comment: Social       Family History   Problem Relation Age of Onset     Diabetes Mother      Hypertension Mother      Hyperlipidemia Mother      Diabetes Father      Coronary Artery Disease Father      Hypertension Father      Hyperlipidemia Father      Chronic Obstructive Pulmonary Disease Father      Cancer - colorectal Father      Prostate Cancer Paternal Grandfather      Diabetes Sister      Hypertension Sister      Hyperlipidemia Sister      Neurologic Disorder Son         Cerebral palsy       Prior to Admission medications    Medication Sig Start Date End Date Taking? Authorizing Provider   allopurinol (ZYLOPRIM) 300 MG tablet Take 1 tablet by mouth daily 8/3/21  Yes Reported, Patient   Aspirin 325 MG CAPS Take 325 mg by mouth daily   Yes Reported, Patient   colchicine (MITIGARE) 0.6 MG capsule Take 0.6 mg by mouth daily   Yes Reported, Patient   lisinopril-hydrochlorothiazide (PRINZIDE/ZESTORETIC) 20-25 MG per tablet Take 1 tablet by mouth daily 10/16/18  Yes Candelario Bull MD   LORazepam (ATIVAN) 0.5 MG tablet Take 1 tablet (0.5 mg) by mouth nightly as needed for anxiety 3/22/23  Yes Donaldo Seymour MD   meloxicam (MOBIC) 15 MG tablet 15 mg daily 8/19/19  Yes Reported, Patient   omeprazole (PRILOSEC) 40 MG DR capsule Take 40 mg by mouth daily 2/28/23  Yes Reported, Patient   rosuvastatin (CRESTOR) 20 MG tablet Take 20 mg by mouth daily 8/3/21  Yes Reported, Patient  "  cyclobenzaprine (FLEXERIL) 10 MG tablet TAKE 1 TABLET BY MOUTH THREE TIMES DAILY AS NEEDED FOR MUSCLE SPASM 7/8/21   Reported, Patient       Allergies   Allergen Reactions     Indomethacin Nausea and Vomiting     Prednisone Diarrhea     And shakey        REVIEW OF SYSTEMS:      ROS: 10 point ROS neg other than the symptoms noted above.     PHYSICAL EXAM:   /82   Pulse 79   Temp 99.1  F (37.3  C) (Tympanic)   Resp 18   Ht 1.626 m (5' 4\")   Wt 76.2 kg (168 lb)   SpO2 97%   BMI 28.84 kg/m   Estimated body mass index is 28.84 kg/m  as calculated from the following:    Height as of this encounter: 1.626 m (5' 4\").    Weight as of this encounter: 76.2 kg (168 lb).   GENERAL APPEARANCE: alert, and oriented  MENTAL STATUS: alert  AIRWAY EXAM: Mallampatti Class II (visualization of the soft palate, fauces, and uvula)  RESP: lungs clear to auscultation - no rales, rhonchi or wheezes  CV: regular rates and rhythm  DIAGNOSTICS:    Not indicated    IMPRESSION   ASA Class 2 - Mild systemic disease    PLAN:   Plan for Colonoscopy with possible biopsy, possible polypectomy. We discussed the risks, benefits and alternatives and the patient wished to proceed.    The above has been forwarded to the consulting provider.      Signed Electronically by: Jose Angel Fiore MD  March 23, 2023          "

## 2023-03-23 NOTE — DISCHARGE INSTRUCTIONS

## 2023-03-23 NOTE — ANESTHESIA POSTPROCEDURE EVALUATION
Patient: Elsi Krause    Procedure: Procedure(s):  COLONOSCOPY       Anesthesia Type:  MAC    Note:  Disposition: Outpatient   Postop Pain Control: Uneventful            Sign Out: Well controlled pain   PONV: No   Neuro/Psych: Uneventful            Sign Out: Acceptable/Baseline neuro status   Airway/Respiratory: Uneventful            Sign Out: Acceptable/Baseline resp. status   CV/Hemodynamics: Uneventful            Sign Out: Acceptable CV status; No obvious hypovolemia; No obvious fluid overload   Other NRE: NONE   DID A NON-ROUTINE EVENT OCCUR? No           Last vitals:  Vitals Value Taken Time   /75 03/23/23 1415   Temp 97.5  F (36.4  C) 03/23/23 1415   Pulse 76 03/23/23 1415   Resp 14 03/23/23 1415   SpO2 98 % 03/23/23 1417   Vitals shown include unvalidated device data.    Electronically Signed By: RAMA Osborn CRNA  March 23, 2023  3:11 PM

## 2023-03-23 NOTE — OR NURSING
Patient resting.  Denies any pain and/or discomfort or NV.  Patient's VSS eating and drinking without difficulty.  Patient's nephew Maricarmen at bedside and discharge instructions reviewed; both verbalize understanding and have no further questions/concerns at this time.  Patient was able to dress herself without difficulties.  Home with brother and nephew.    Patient and responsible adult given discharge instructions with no questions regarding instructions. Juliet score 18 Pain level 0/10.  Discharged from unit via ambulatory. Patient discharged to home.

## 2023-04-17 ENCOUNTER — MYC REFILL (OUTPATIENT)
Dept: FAMILY MEDICINE | Facility: OTHER | Age: 63
End: 2023-04-17

## 2023-04-17 DIAGNOSIS — F41.9 ANXIETY: ICD-10-CM

## 2023-04-17 RX ORDER — LORAZEPAM 0.5 MG/1
0.5 TABLET ORAL
Qty: 28 TABLET | Refills: 0 | Status: SHIPPED | OUTPATIENT
Start: 2023-04-19 | End: 2023-05-16

## 2023-04-17 NOTE — TELEPHONE ENCOUNTER
ATIVAN      Last Written Prescription Date:  3/22/2023  Last Fill Quantity: 28,   # refills: 0  Last Office Visit: 3/13/2023  Future Office visit:    Next 5 appointments (look out 90 days)    Jul 10, 2023  8:45 AM  (Arrive by 8:30 AM)  [facility] with Donaldo [facility]ymour [facility]  [facility] [address] [facility] [address] [facility] [address]  [phone]           Routing refill request to provider for review/approval because:

## 2023-04-29 ENCOUNTER — HEALTH MAINTENANCE LETTER (OUTPATIENT)
Age: 63
End: 2023-04-29

## 2023-05-10 NOTE — PROGRESS NOTES
"  Assessment & Plan     Chronic pain of both shoulders  Probably multifactorial including some suspected degenerative changes well as some chronic tendinitis from overuse, but also has complex cervical spine history which could be contributing to some components of radiculopathy.  Predominant issue seems to be musculoskeletal rather than neuropathic at this point but may need to update neck work-up in the future.  Start with x-ray, discussed trial of steroid injection and physical therapy.  We will also trial switching from meloxicam to Celebrex; discussed risk/benefits of chronic NSAID use.  - XR SHOULDER RIGHT G/E 2 VIEWS (Clinic Performed)  - XR SHOULDER LEFT G/E 2 VIEWS (Clinic Performed)  - celecoxib (CELEBREX) 100 MG capsule; Take 1 capsule (100 mg) by mouth 2 times daily  - Consider repeat cervical spine imaging versus EMG    De Quervain's disease (tenosynovitis)  - Orthopedic  Referral; Future    33 minutes spent by me on the date of the encounter doing chart review, history and exam, documentation and further activities per the note       BMI:   Estimated body mass index is 29.52 kg/m  as calculated from the following:    Height as of this encounter: 1.626 m (5' 4\").    Weight as of this encounter: 78 kg (172 lb).   Weight management plan: Discussed healthy diet and exercise guidelines    Follow-up in about 1 month.    Donaldo Seymour MD  [facility] - RONNA Calzada is a 62 year old, presenting for the following health issues:  Musculoskeletal Problem    HPI     Pain History:  When did you first notice your pain? 2 months   Have you seen this provider for your pain in the past?   Yes   Where in your body do you have pain? Joints, neck  Are you seeing anyone else for your pain? 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, carrying heavy trays, on her feet all day  -Dull, throbbing, achiness of bilateral shoulders  -Ache all night and has trouble sleeping due to the discomfort  -Occasional shooting pains down both arms, this is about at her baseline  -Shoulder seem to fatigue more easily lately but no acute weakness    -Also significant tenderness on radial wrist into thumb  -Has had [facility] arthritis injections in the past but this is a bit more widespread  -Worse with holding, lifting, grabbing with the thumb    -Has been trying heating pad, meloxicam 15 mg daily  -Very clear association with worsening symptoms and work shifts    -History also notable for cervical discectomy and fusion  -Does have chronic cervical spine pain with limited ROM in all directions but this is about at baseline  -Not having the same upper extremity radiculopathy she had postsurgery  -No postop CT/MRI of neck        3/9/2017    11:33 AM 3/13/2023     9:46 AM 5/11/2023     9:42 AM   PHQ-9 SCORE   PHQ-9 Total Score MyChart  8 (Mild depression) 7 (Mild depression)   PHQ-9 Total Score 16 8 7           3/9/2017    11:33 AM 3/13/2023     9:46 AM 5/11/2023     9:44 AM   ROBY-7 SCORE   Total Score  9 (mild anxiety) 10 (moderate anxiety)   Total Score 19 9 10           5/11/2023     9:52 AM   PEG Score   PEG Total Score 5.67       Chronic Pain Follow Up:  Location of pain: Joints, neck  Analgesia/pain control:    - Recent changes:  Worsening    - Overall control: Inadequate pain control    - Current treatments: meloxicam   Adherence:     - Do you ever take more pain medicine than prescribed? No    - When did you take your last dose of pain medicine?  Last night HS   Adverse effects: No   PDMP Review       Value Time User    State PDMP site checked  Yes 5/11/2023  7:53 AM Donaldo Seymour MD        Last CSA Agreement:   CSA -- Patient Level:    CSA: None found at the patient level.       Review of Systems   Constitutional, HEENT, cardiovascular,  "pulmonary, gi and gu systems are negative, except as otherwise noted.      Objective    /78 (BP Location: Right arm, Patient Position: Sitting, Cuff Size: Adult Regular)   Pulse 76   Temp 98  F (36.7  C) (Tympanic)   Resp 16   Ht 1.626 m (5' 4\")   Wt 78 kg (172 lb)   SpO2 96%   BMI 29.52 kg/m    Body mass index is 29.52 kg/m .  Physical Exam  Vitals reviewed.   Constitutional:       General: She is not in acute distress.     Appearance: Normal appearance. She is not toxic-appearing.   HENT:      Head: Normocephalic and atraumatic.   Neck:      Comments: Limited ROM with extension/flexion, symmetrical rotational.  Cardiovascular:      Rate and Rhythm: Normal rate and regular rhythm.      Pulses: Normal pulses.      Heart sounds: Normal heart sounds.   Pulmonary:      Effort: Pulmonary effort is normal.      Breath sounds: Normal breath sounds.   Musculoskeletal:      Right shoulder: Tenderness and crepitus present. No swelling, deformity, effusion or bony tenderness. Normal range of motion.      Left shoulder: Tenderness and crepitus present. No swelling, deformity, effusion or bony tenderness. Normal range of motion.      Cervical back: Tenderness present. No rigidity. Decreased range of motion.      Comments: Full passive/active ROM bilateral shoulders.  5/5 strength of all shoulder movements.  Positive Finkelstein right.   Lymphadenopathy:      Cervical: No cervical adenopathy.   Skin:     General: Skin is warm and dry.   Neurological:      General: No focal deficit present.      Mental Status: She is alert and oriented to person, place, and time.   Psychiatric:         Mood and Affect: Mood normal.         Behavior: Behavior normal.            "

## 2023-05-11 ENCOUNTER — OFFICE VISIT (OUTPATIENT)
Dept: FAMILY MEDICINE | Facility: OTHER | Age: 63
End: 2023-05-11
Attending: STUDENT IN AN ORGANIZED HEALTH CARE EDUCATION/TRAINING PROGRAM
Payer: COMMERCIAL

## 2023-05-11 ENCOUNTER — ANCILLARY PROCEDURE (OUTPATIENT)
Dept: GENERAL RADIOLOGY | Facility: OTHER | Age: 63
End: 2023-05-11
Attending: STUDENT IN AN ORGANIZED HEALTH CARE EDUCATION/TRAINING PROGRAM
Payer: COMMERCIAL

## 2023-05-11 VITALS
DIASTOLIC BLOOD PRESSURE: 78 MMHG | BODY MASS INDEX: 29.37 KG/M2 | TEMPERATURE: 98 F | RESPIRATION RATE: 16 BRPM | OXYGEN SATURATION: 96 % | HEART RATE: 76 BPM | HEIGHT: 64 IN | WEIGHT: 172 LBS | SYSTOLIC BLOOD PRESSURE: 120 MMHG

## 2023-05-11 DIAGNOSIS — M65.4 DE QUERVAIN'S DISEASE (TENOSYNOVITIS): ICD-10-CM

## 2023-05-11 DIAGNOSIS — G89.29 CHRONIC PAIN OF BOTH SHOULDERS: Primary | ICD-10-CM

## 2023-05-11 DIAGNOSIS — M25.512 CHRONIC PAIN OF BOTH SHOULDERS: Primary | ICD-10-CM

## 2023-05-11 DIAGNOSIS — M25.511 CHRONIC PAIN OF BOTH SHOULDERS: Primary | ICD-10-CM

## 2023-05-11 PROCEDURE — 73030 X-RAY EXAM OF SHOULDER: CPT | Mod: TC | Performed by: RADIOLOGY

## 2023-05-11 PROCEDURE — 99214 OFFICE O/P EST MOD 30 MIN: CPT | Performed by: STUDENT IN AN ORGANIZED HEALTH CARE EDUCATION/TRAINING PROGRAM

## 2023-05-11 RX ORDER — CELECOXIB 100 MG/1
100 CAPSULE ORAL 2 TIMES DAILY
Qty: 60 CAPSULE | Refills: 2 | Status: SHIPPED | OUTPATIENT
Start: 2023-05-11 | End: 2023-07-10 | Stop reason: SINTOL

## 2023-05-11 ASSESSMENT — PAIN SCALES - GENERAL: PAINLEVEL: MODERATE PAIN (5)

## 2023-05-11 ASSESSMENT — ANXIETY QUESTIONNAIRES
1. FEELING NERVOUS, ANXIOUS, OR ON EDGE: SEVERAL DAYS
IF YOU CHECKED OFF ANY PROBLEMS ON THIS QUESTIONNAIRE, HOW DIFFICULT HAVE THESE PROBLEMS MADE IT FOR YOU TO DO YOUR WORK, TAKE CARE OF THINGS AT HOME, OR GET ALONG WITH OTHER PEOPLE: SOMEWHAT DIFFICULT
5. BEING SO RESTLESS THAT IT IS HARD TO SIT STILL: MORE THAN HALF THE DAYS
GAD7 TOTAL SCORE: 10
7. FEELING AFRAID AS IF SOMETHING AWFUL MIGHT HAPPEN: SEVERAL DAYS
GAD7 TOTAL SCORE: 10
2. NOT BEING ABLE TO STOP OR CONTROL WORRYING: SEVERAL DAYS
4. TROUBLE RELAXING: MORE THAN HALF THE DAYS
3. WORRYING TOO MUCH ABOUT DIFFERENT THINGS: SEVERAL DAYS
7. FEELING AFRAID AS IF SOMETHING AWFUL MIGHT HAPPEN: SEVERAL DAYS
8. IF YOU CHECKED OFF ANY PROBLEMS, HOW DIFFICULT HAVE THESE MADE IT FOR YOU TO DO YOUR WORK, TAKE CARE OF THINGS AT HOME, OR GET ALONG WITH OTHER PEOPLE?: SOMEWHAT DIFFICULT
6. BECOMING EASILY ANNOYED OR IRRITABLE: MORE THAN HALF THE DAYS
GAD7 TOTAL SCORE: 10

## 2023-05-11 ASSESSMENT — PATIENT HEALTH QUESTIONNAIRE - PHQ9
SUM OF ALL RESPONSES TO PHQ QUESTIONS 1-9: 7
SUM OF ALL RESPONSES TO PHQ QUESTIONS 1-9: 7
10. IF YOU CHECKED OFF ANY PROBLEMS, HOW DIFFICULT HAVE THESE PROBLEMS MADE IT FOR YOU TO DO YOUR WORK, TAKE CARE OF THINGS AT HOME, OR GET ALONG WITH OTHER PEOPLE: SOMEWHAT DIFFICULT

## 2023-05-11 NOTE — LETTER
My Depression Action Plan  Name: Elsi Krause   Date of Birth 1960  Date: 5/11/2023    My doctor: Donaldo Seymour   My clinic: [facility] [place]  [address] [place] [address]  [phone]            GREEN    ZONE   Good Control    What it looks like:   Things are going generally well. You have normal ups and downs. You may even feel depressed from time to time, but bad moods usually last less than a day.   What you need to do:  Continue to care for yourself (see self care plan)  Check your depression survival kit and update it as needed  Follow your physician s recommendations including any medication.  Do not stop taking medication unless you consult with your physician first.             YELLOW         ZONE Getting Worse    What it looks like:   Depression is starting to interfere with your life.   It may be hard to get out of bed; you may be starting to isolate yourself from others.  Symptoms of depression are starting to last most all day and this has happened for several days.   You may have suicidal thoughts but they are not constant.   What you need to do:     Call your care team. Your response to treatment will improve if you keep your care team informed of your progress. Yellow periods are signs an adjustment may need to be made.     Continue your self-care.  Just get dressed and ready for the day.  Don't give yourself time to talk yourself out of it.    Talk to someone in your support network.    Open up your Depression Self-Care Plan/Wellness Kit.             RED    ZONE Medical Alert - Get Help    What it looks like:   Depression is seriously interfering with your life.   You may experience these or other symptoms: You can t get out of bed most days, can t work or engage in other necessary activities, you have trouble taking care of basic hygiene, or basic responsibilities, thoughts of suicide or death that will not go away, self-injurious behavior.     What you  need to do:  Call your care team and request a same-day appointment. If they are not available (weekends or after hours) call your local crisis line, emergency room or 911.          Depression Self-Care Plan / Wellness Kit    Many people find that medication and therapy are helpful treatments for managing depression. In addition, making small changes to your everyday life can help to boost your mood and improve your wellbeing. Below are some tips for you to consider. Be sure to talk with your medical provider and/or behavioral health consultant if your symptoms are worsening or not improving.     Sleep   Sleep hygiene  means all of the habits that support good, restful sleep. It includes maintaining a consistent bedtime and wake time, using your bedroom only for sleeping or sex, and keeping the bedroom dark and free of distractions like a computer, smartphone, or television.     Develop a Healthy Routine  Maintain good hygiene. Get out of bed in the morning, make your bed, brush your teeth, take a shower, and get dressed. Don t spend too much time viewing media that makes you feel stressed. Find time to relax each day.    Exercise  Get some form of exercise every day. This will help reduce pain and release endorphins, the  feel good  chemicals in your brain. It can be as simple as just going for a walk or doing some gardening, anything that will get you moving.      Diet  Strive to eat healthy foods, including fruits and vegetables. Drink plenty of water. Avoid excessive sugar, caffeine, alcohol, and other mood-altering substances.     Stay Connected with Others  Stay in touch with friends and family members.    Manage Your Mood  Try deep breathing, massage therapy, biofeedback, or meditation. Take part in fun activities when you can. Try to find something to smile about each day.     Psychotherapy  Be open to working with a therapist if your provider recommends it.     Medication  Be sure to take your medication as  prescribed. Most anti-depressants need to be taken every day. It usually takes several weeks for medications to work. Not all medicines work for all people. It is important to follow-up with your provider to make sure you have a treatment plan that is working for you. Do not stop your medication abruptly without first discussing it with your provider.    Crisis Resources   These hotlines are for both adults and children. They and are open 24 hours a day, 7 days a week unless noted otherwise.    National Suicide Prevention Lifeline   988 or 3-543-445-HPCX (4465)    Crisis Text Line    www.crisistextline.org  Text HOME to 064006 from anywhere in the [place], anytime, about any type of crisis. A live, trained crisis counselor will receive the text and respond quickly.    Eric Lifeline for LGBTQ Youth  A national crisis intervention and suicide lifeline for LGBTQ youth under 25. Provides a safe place to talk without judgement. Call 1-256.360.8371; text START to 894358 or visit www.thetrevorproject.org to talk to a trained counselor.    For [facility] crisis numbers, visit the [facility] website at:  https://[place].gov/dhs/people-we-serve/adults/health-care/mental-health/resources/crisis-contacts.jsp

## 2023-05-16 ENCOUNTER — MYC REFILL (OUTPATIENT)
Dept: FAMILY MEDICINE | Facility: OTHER | Age: 63
End: 2023-05-16

## 2023-05-16 DIAGNOSIS — F41.9 ANXIETY: ICD-10-CM

## 2023-05-16 RX ORDER — LORAZEPAM 0.5 MG/1
0.5 TABLET ORAL
Qty: 28 TABLET | Refills: 0 | Status: SHIPPED | OUTPATIENT
Start: 2023-05-16 | End: 2023-06-18

## 2023-05-16 NOTE — TELEPHONE ENCOUNTER
Lorazepam      Last Written Prescription Date:  4/19/23  Last Fill Quantity: 28,   # refills: 0  Last Office Visit: 5/11/23  Future Office visit:    Next 5 appointments (look out 90 days)    Jul 10, 2023  8:45 AM  (Arrive by 8:30 AM)  PHYSICAL with Donaldo Seymour MD  [facility] [address] [facility] [address] [facility] [address]  [phone]

## 2023-05-26 ENCOUNTER — MYC REFILL (OUTPATIENT)
Dept: FAMILY MEDICINE | Facility: OTHER | Age: 63
End: 2023-05-26

## 2023-05-26 DIAGNOSIS — K21.00 GASTROESOPHAGEAL REFLUX DISEASE WITH ESOPHAGITIS WITHOUT HEMORRHAGE: Primary | ICD-10-CM

## 2023-05-30 ENCOUNTER — MYC REFILL (OUTPATIENT)
Dept: FAMILY MEDICINE | Facility: OTHER | Age: 63
End: 2023-05-30

## 2023-05-30 NOTE — TELEPHONE ENCOUNTER
omeprazole (PRILOSEC) 40 MG DR capsule  Last Written Prescription Date:  Historical  Last Fill Quantity: 0,   # refills: 0  Last Office Visit: 5/11/2023  Future Office visit:    Next 5 appointments (look out 90 days)    Jul 10, 2023  8:45 AM  (Arrive by 8:30 AM)  PHYSICAL with Donaldo Seymour MD  [facility] ) [address] [facility] [address]  [phone]

## 2023-05-31 RX ORDER — OMEPRAZOLE 40 MG/1
40 CAPSULE, DELAYED RELEASE ORAL DAILY
Qty: 30 CAPSULE | Refills: 2 | Status: SHIPPED | OUTPATIENT
Start: 2023-05-31 | End: 2023-09-29

## 2023-06-01 RX ORDER — OMEPRAZOLE 40 MG/1
40 CAPSULE, DELAYED RELEASE ORAL DAILY
OUTPATIENT
Start: 2023-06-01

## 2023-06-01 NOTE — TELEPHONE ENCOUNTER
omeprazole (PRILOSEC) 40 MG DR capsule   Last Written Prescription Date:  5/31/23  Last Fill Quantity: 30,   # refills: 2  Last Office Visit: 5/11/23  Future Office visit:    Next 5 appointments (look out 90 days)    Jul 10, 2023  8:45 AM  (Arrive by 8:30 AM)  PHYSICAL with Donaldo Seymour MD  [facility] [address] [facility] [address]  [phone]

## 2023-06-18 ENCOUNTER — MYC REFILL (OUTPATIENT)
Dept: FAMILY MEDICINE | Facility: OTHER | Age: 63
End: 2023-06-18

## 2023-06-18 DIAGNOSIS — F41.9 ANXIETY: ICD-10-CM

## 2023-06-19 RX ORDER — LORAZEPAM 0.5 MG/1
0.5 TABLET ORAL
Qty: 28 TABLET | Refills: 0 | Status: SHIPPED | OUTPATIENT
Start: 2023-06-19 | End: 2023-07-23

## 2023-06-19 NOTE — TELEPHONE ENCOUNTER
Lorazepam      Last Written Prescription Date:  5/16/23  Last Fill Quantity: 28,   # refills: 0  Last Office Visit: 5/11/23  Future Office visit:    Next 5 appointments (look out 90 days)    Jul 10, 2023  8:45 AM  (Arrive by 8:30 AM)  PHYSICAL with Donaldo Seymour MD  [facility] ([facility] ) [address] [facility] [address]  [phone]

## 2023-06-22 ENCOUNTER — MYC REFILL (OUTPATIENT)
Dept: FAMILY MEDICINE | Facility: OTHER | Age: 63
End: 2023-06-22

## 2023-06-22 DIAGNOSIS — F41.9 ANXIETY: ICD-10-CM

## 2023-06-22 DIAGNOSIS — Z87.39 HX OF GOUT: Primary | ICD-10-CM

## 2023-06-22 NOTE — TELEPHONE ENCOUNTER
Ativan       Last Written Prescription Date:  6/19/23  Last Fill Quantity: 28,   # refills: 0  Last Office Visit: 5/11/23  Future Office visit:    Next 5 appointments (look out 90 days)    Jul 10, 2023  8:45 AM  (Arrive by 8:30 AM)  PHYSICAL with Donaldo Seymour MD  [facility] [address]  [facility] [address]  [phone]           Routing refill request to provider for review/approval because:      Allopurinol       Last Written Prescription Date:  Historical   Last Fill Quantity: 0,   # refills: 0  Last Office Visit: 5/11/23  Future Office visit:    Next 5 appointments (look out 90 days)    Jul 10, 2023  8:45 AM  (Arrive by 8:30 AM)  PHYSICAL with Donaldo Seymour MD  [facility] ) [address]  [facility] [address]  [phone]           Routing refill request to provider for review/approval because:

## 2023-06-23 RX ORDER — ALLOPURINOL 300 MG/1
1 TABLET ORAL DAILY
Qty: 90 TABLET | Refills: 3 | Status: SHIPPED | OUTPATIENT
Start: 2023-06-23 | End: 2024-06-10

## 2023-06-23 RX ORDER — LORAZEPAM 0.5 MG/1
0.5 TABLET ORAL
Qty: 28 TABLET | Refills: 0 | OUTPATIENT
Start: 2023-06-23

## 2023-06-26 ENCOUNTER — MYC REFILL (OUTPATIENT)
Dept: FAMILY MEDICINE | Facility: OTHER | Age: 63
End: 2023-06-26

## 2023-06-26 DIAGNOSIS — I10 BENIGN ESSENTIAL HYPERTENSION: ICD-10-CM

## 2023-06-27 ENCOUNTER — MYC REFILL (OUTPATIENT)
Dept: FAMILY MEDICINE | Facility: OTHER | Age: 63
End: 2023-06-27

## 2023-06-27 DIAGNOSIS — I10 BENIGN ESSENTIAL HYPERTENSION: ICD-10-CM

## 2023-06-27 RX ORDER — LISINOPRIL AND HYDROCHLOROTHIAZIDE 20; 25 MG/1; MG/1
1 TABLET ORAL DAILY
Qty: 90 TABLET | Refills: 3 | Status: SHIPPED | OUTPATIENT
Start: 2023-06-27 | End: 2024-06-10

## 2023-06-27 NOTE — TELEPHONE ENCOUNTER
Lisinopril/hctz       Last Written Prescription Date:  10/16/2018  Last Fill Quantity: 90,   # refills: 0  Last Office Visit: 5/11/23  Future Office visit:    Next 5 appointments (look out 90 days)    Jul 10, 2023  8:45 AM  (Arrive by 8:30 AM)  PHYSICAL with Donaldo Seymour MD  [facility] ) [address] [facility] [address]  [phone]           Routing refill request to provider for review/approval because:

## 2023-06-28 RX ORDER — LISINOPRIL AND HYDROCHLOROTHIAZIDE 20; 25 MG/1; MG/1
1 TABLET ORAL DAILY
Qty: 90 TABLET | Refills: 0 | OUTPATIENT
Start: 2023-06-28

## 2023-07-10 ENCOUNTER — OFFICE VISIT (OUTPATIENT)
Dept: FAMILY MEDICINE | Facility: OTHER | Age: 63
End: 2023-07-10
Attending: STUDENT IN AN ORGANIZED HEALTH CARE EDUCATION/TRAINING PROGRAM
Payer: COMMERCIAL

## 2023-07-10 VITALS
DIASTOLIC BLOOD PRESSURE: 79 MMHG | WEIGHT: 172.13 LBS | HEART RATE: 78 BPM | RESPIRATION RATE: 16 BRPM | BODY MASS INDEX: 29.55 KG/M2 | OXYGEN SATURATION: 97 % | SYSTOLIC BLOOD PRESSURE: 136 MMHG | TEMPERATURE: 97.8 F

## 2023-07-10 DIAGNOSIS — G89.29 CHRONIC PAIN OF BOTH SHOULDERS: ICD-10-CM

## 2023-07-10 DIAGNOSIS — Z00.00 ROUTINE GENERAL MEDICAL EXAMINATION AT A HEALTH CARE FACILITY: Primary | ICD-10-CM

## 2023-07-10 DIAGNOSIS — F33.41 RECURRENT MAJOR DEPRESSIVE DISORDER, IN PARTIAL REMISSION (H): ICD-10-CM

## 2023-07-10 DIAGNOSIS — Z00.00 HEALTHCARE MAINTENANCE: ICD-10-CM

## 2023-07-10 DIAGNOSIS — I10 ESSENTIAL HYPERTENSION: ICD-10-CM

## 2023-07-10 DIAGNOSIS — K21.00 GASTROESOPHAGEAL REFLUX DISEASE WITH ESOPHAGITIS WITHOUT HEMORRHAGE: ICD-10-CM

## 2023-07-10 DIAGNOSIS — E78.5 HYPERLIPIDEMIA LDL GOAL <100: ICD-10-CM

## 2023-07-10 DIAGNOSIS — Z12.4 SCREENING FOR MALIGNANT NEOPLASM OF CERVIX: ICD-10-CM

## 2023-07-10 DIAGNOSIS — Z12.31 ENCOUNTER FOR SCREENING MAMMOGRAM FOR MALIGNANT NEOPLASM OF BREAST: ICD-10-CM

## 2023-07-10 DIAGNOSIS — R73.01 IMPAIRED FASTING GLUCOSE: ICD-10-CM

## 2023-07-10 DIAGNOSIS — Z87.39 HX OF GOUT: ICD-10-CM

## 2023-07-10 DIAGNOSIS — F41.9 ANXIETY: ICD-10-CM

## 2023-07-10 DIAGNOSIS — M25.512 CHRONIC PAIN OF BOTH SHOULDERS: ICD-10-CM

## 2023-07-10 DIAGNOSIS — M25.511 CHRONIC PAIN OF BOTH SHOULDERS: ICD-10-CM

## 2023-07-10 PROCEDURE — G0123 SCREEN CERV/VAG THIN LAYER: HCPCS | Performed by: STUDENT IN AN ORGANIZED HEALTH CARE EDUCATION/TRAINING PROGRAM

## 2023-07-10 PROCEDURE — 87624 HPV HI-RISK TYP POOLED RSLT: CPT | Performed by: STUDENT IN AN ORGANIZED HEALTH CARE EDUCATION/TRAINING PROGRAM

## 2023-07-10 PROCEDURE — 99214 OFFICE O/P EST MOD 30 MIN: CPT | Mod: 25 | Performed by: STUDENT IN AN ORGANIZED HEALTH CARE EDUCATION/TRAINING PROGRAM

## 2023-07-10 PROCEDURE — 99396 PREV VISIT EST AGE 40-64: CPT | Performed by: STUDENT IN AN ORGANIZED HEALTH CARE EDUCATION/TRAINING PROGRAM

## 2023-07-10 RX ORDER — ROSUVASTATIN CALCIUM 20 MG/1
20 TABLET, COATED ORAL DAILY
Qty: 90 TABLET | Refills: 0 | Status: SHIPPED | OUTPATIENT
Start: 2023-07-10 | End: 2023-10-02

## 2023-07-10 RX ORDER — MELOXICAM 15 MG/1
15 TABLET ORAL DAILY
COMMUNITY
End: 2023-07-10

## 2023-07-10 RX ORDER — MELOXICAM 15 MG/1
15 TABLET ORAL DAILY
Qty: 90 TABLET | Refills: 0 | Status: SHIPPED | OUTPATIENT
Start: 2023-07-10 | End: 2023-10-02

## 2023-07-10 ASSESSMENT — ANXIETY QUESTIONNAIRES
GAD7 TOTAL SCORE: 15
GAD7 TOTAL SCORE: 15
2. NOT BEING ABLE TO STOP OR CONTROL WORRYING: MORE THAN HALF THE DAYS
5. BEING SO RESTLESS THAT IT IS HARD TO SIT STILL: NEARLY EVERY DAY
3. WORRYING TOO MUCH ABOUT DIFFERENT THINGS: SEVERAL DAYS
4. TROUBLE RELAXING: NEARLY EVERY DAY
7. FEELING AFRAID AS IF SOMETHING AWFUL MIGHT HAPPEN: MORE THAN HALF THE DAYS
1. FEELING NERVOUS, ANXIOUS, OR ON EDGE: MORE THAN HALF THE DAYS
6. BECOMING EASILY ANNOYED OR IRRITABLE: MORE THAN HALF THE DAYS

## 2023-07-10 ASSESSMENT — PATIENT HEALTH QUESTIONNAIRE - PHQ9
SUM OF ALL RESPONSES TO PHQ QUESTIONS 1-9: 6
SUM OF ALL RESPONSES TO PHQ QUESTIONS 1-9: 6
10. IF YOU CHECKED OFF ANY PROBLEMS, HOW DIFFICULT HAVE THESE PROBLEMS MADE IT FOR YOU TO DO YOUR WORK, TAKE CARE OF THINGS AT HOME, OR GET ALONG WITH OTHER PEOPLE: NOT DIFFICULT AT ALL

## 2023-07-10 ASSESSMENT — ENCOUNTER SYMPTOMS
SORE THROAT: 0
HEMATOCHEZIA: 0
DIZZINESS: 0
MYALGIAS: 1
BREAST MASS: 0
DIARRHEA: 0
JOINT SWELLING: 1
FEVER: 0
NAUSEA: 0
NERVOUS/ANXIOUS: 1
WEAKNESS: 0
SHORTNESS OF BREATH: 0
HEMATURIA: 0
EYE PAIN: 0
HEADACHES: 0
CHILLS: 0
FREQUENCY: 0
DYSURIA: 0
COUGH: 0
ABDOMINAL PAIN: 0
PARESTHESIAS: 0
PALPITATIONS: 0
CONSTIPATION: 0
ARTHRALGIAS: 1

## 2023-07-10 ASSESSMENT — PAIN SCALES - GENERAL: PAINLEVEL: MODERATE PAIN (4)

## 2023-07-10 NOTE — PROGRESS NOTES
SUBJECTIVE:   CC: Elsi is an 62 year old who presents for preventive health visit.       7/10/2023     8:32 AM   Additional Questions   Roomed by Fanny ULLOA     Healthy Habits:     Getting at least 3 servings of Calcium per day:  Yes    Bi-annual eye exam:  NO    Dental care twice a year:  NO    Sleep apnea or symptoms of sleep apnea:  None    Diet:  Regular (no restrictions)    Frequency of exercise:  4-5 days/week    Additional concerns today:  No    Concerns:    Chronic bilateral shoulder pain  -Addressed at last office visit 5/11/2023 with referral to orthopedics  -She did hear from orthopedics but declined to schedule appointment  -Did get bilateral shoulder x-rays with significant arthritis on the right side, fairly unremarkable x-ray on the left  -The majority of her symptoms lately are actually left-sided  -Does not have restricted strength or ROM but does get fairly sharp shooting pains down the lateral and anterior biceps when abducting and flexing beyond about 80 degrees  -Symptoms exacerbated by overuse which is hard to avoid with her job as a   -She is at the point where she is ready to try steroid injection    Today's PHQ-9 Score:       7/10/2023     7:40 AM   PHQ-9 SCORE   PHQ-9 Total Score MyChart 6 (Mild depression)   PHQ-9 Total Score 6       Pain History:  When did you first notice your pain? Ongoing- worse the last month  Have you seen this provider for your pain in the past?   Yes   Where in your body do you have pain? Left arm  Are you seeing anyone else for your pain? No        3/13/2023     9:46 AM 5/11/2023     9:42 AM 7/10/2023     7:40 AM   PHQ-9 SCORE   PHQ-9 Total Score MyChart 8 (Mild depression) 7 (Mild depression) 6 (Mild depression)   PHQ-9 Total Score 8 7 6           3/13/2023     9:46 AM 5/11/2023     9:44 AM 7/10/2023     7:40 AM   ROBY-7 SCORE   Total Score 9 (mild anxiety) 10 (moderate anxiety) 15 (severe anxiety)   Total Score 9 10 15     Social History     Tobacco  Use     Smoking status: Former     Smokeless tobacco: Never   Substance Use Topics     Alcohol use: Yes     Comment: Social           7/10/2023     7:42 AM   Alcohol Use   Prescreen: >3 drinks/day or >7 drinks/week? Not Applicable     Reviewed orders with patient.  Reviewed health maintenance and updated orders accordingly - Yes  Lab work is in process    Breast Cancer Screening:    FHS-7:       7/10/2023     7:43 AM   Breast CA Risk Assessment (FHS-7)   Did any of your first-degree relatives have breast or ovarian cancer? Yes   Did any man in your family have breast cancer? No   Did any woman in your family have breast and ovarian cancer? Yes   Did any woman in your family have breast cancer before age 50 y? No   Do you have 2 or more relatives with breast and/or ovarian cancer? No   Do you have 2 or more relatives with breast and/or bowel cancer? No     Mammogram Screening: Recommended mammography every 1-2 years with patient discussion and risk factor consideration  Pertinent mammograms are reviewed under the imaging tab.    History of abnormal Pap smear: NO - age 30-65 PAP every 5 years with negative HPV co-testing recommended     Reviewed and updated as needed this visit by clinical staff    Allergies  Meds            Reviewed and updated as needed this visit by Provider                 Review of Systems   Constitutional: Negative for chills and fever.   HENT: Negative for congestion, ear pain and sore throat.    Eyes: Negative for pain and visual disturbance.   Respiratory: Negative for cough and shortness of breath.    Cardiovascular: Negative for chest pain, palpitations and peripheral edema.   Gastrointestinal: Negative for abdominal pain, constipation, diarrhea, hematochezia and nausea.   Breasts:  Negative for tenderness, breast mass and discharge.   Genitourinary: Negative for dysuria, frequency, genital sores, hematuria, pelvic pain, urgency, vaginal bleeding and vaginal discharge.   Musculoskeletal:  Positive for arthralgias, joint swelling and myalgias.   Skin: Negative for rash.   Neurological: Negative for dizziness, weakness, headaches and paresthesias.   Psychiatric/Behavioral: Positive for mood changes. The patient is nervous/anxious.      OBJECTIVE:   /79   Pulse 78   Temp 97.8  F (36.6  C) (Tympanic)   Resp 16   Wt 78.1 kg (172 lb 2 oz)   SpO2 97%   BMI 29.55 kg/m    Physical Exam  GENERAL: healthy, alert and no distress  EYES: Eyes grossly normal to inspection, PERRL and conjunctivae and sclerae normal  HENT: ear canals and TM's normal, nose and mouth without ulcers or lesions  NECK: no adenopathy, no asymmetry, masses, or scars and thyroid normal to palpation  RESP: lungs clear to auscultation - no rales, rhonchi or wheezes  BREAST: normal without masses, tenderness or nipple discharge and no palpable axillary masses or adenopathy  CV: regular rate and rhythm, normal S1 S2, no S3 or S4, no murmur, click or rub, no peripheral edema and peripheral pulses strong  ABDOMEN: soft, nontender, no hepatosplenomegaly, no masses and bowel sounds normal   (female): normal female external genitalia, normal urethral meatus, vaginal mucosa pink, moist, well rugated, and normal cervix/adnexa/uterus without masses or discharge  MS: no gross musculoskeletal defects noted, no edema  SKIN: no suspicious lesions or rashes  NEURO: Normal strength and tone, mentation intact and speech normal  PSYCH: mentation appears normal, affect normal/bright    Lab services not available today, will return later this week for labs that will be ordered today.    ASSESSMENT/PLAN:     Routine general medical examination at a health care facility  - A pap thin layer screen with  HPV - recommended age 30 - 65 years  - MA Screening Bilateral w/ Nacho; Future  - Lipid Profile; Future  - TDAP VACCINE (Adacel, Boostrix)  - CBC with platelets and differential; Future  - Comprehensive metabolic panel (BMP + Alb, Alk Phos, ALT, AST,  "Total. Bili, TP); Future  - Uric acid; Future  - Hemoglobin A1c; Future  - TSH with free T4 reflex; Future  - Vitamin D Deficiency; Future  - Preventative care guidelines provided in AVS  - Return in 1 year for annual physical    Healthcare maintenance  - BP: Controlled, see below  - BMI: Reviewed healthy diet and exercise recommendations  Estimated body mass index is 29.55 kg/m  as calculated from the following:    Height as of 5/11/23: 1.626 m (5' 4\").    Weight as of this encounter: 78.1 kg (172 lb 2 oz).  - ASCVD risk screening: No high risk family history, discussed lipid/A1c screening, ASCVD risk mitigation including statin/ASA, lifestyle management   The ASCVD Risk score (Quinton ARGUETA, et al., 2019) failed to calculate for the following reasons:    Cannot find a previous HDL lab    Cannot find a previous total cholesterol lab  - Mood: Stable, see below      8/17/2021    11:00 AM   PHQ-2 ( 1999 [employer])   Q1: Little interest or pleasure in doing things 0   Q2: Feeling down, depressed or hopeless 0   PHQ-2 Score 0   PHQ-2 Total Score (12-17 Years)- Positive if 3 or more points; Administer PHQ-A if positive 0     - Tobacco/substance screening: No tobacco or illicit substance use     Tobacco Use      Smoking status: Former      Smokeless tobacco: Never  - Infectious disease screening: Low risk; deferred  - Cancer screening:              -Family history: N/A   -Lung: N/A   -Breast: Mammo today   -Colon: Colonoscopy 5/23/2023; 5-year follow-up   -Cervix: Pap smear today  - Immunizations: Due for Shingrix; discussed, tentatively plans to get at local pharmacy    Essential hypertension  Normotensive, has been well controlled on current regimen.  - CBC with platelets and differential; Future  - Comprehensive metabolic panel (BMP + Alb, Alk Phos, ALT, AST, Total. Bili, TP); Future  - Zestoretic 20-25 mg daily    Hyperlipidemia LDL goal <100  - Lipid Profile; Future  - rosuvastatin (CRESTOR) 20 MG tablet; Take 1 tablet (20 " mg) by mouth daily    Impaired fasting glucose  - Hemoglobin A1c; Future    Gastroesophageal reflux disease with esophagitis without hemorrhage  - CBC with platelets and differential; Future  - Comprehensive metabolic panel (BMP + Alb, Alk Phos, ALT, AST, Total. Bili, TP); Future  - Prilosec 40 mg daily    Recurrent major depressive disorder, in partial remission (H)  Anxiety  Stable.  Has been on as needed lorazepam monotherapy from prior PCP.  Overall probably some room for optimization, discussed SSRI/SNRI.  Sounds like remote history of possibly Prozac?  Set stage for possible trial of alternate therapy, could consider duloxetine given chronic pain issues as well.  Continue current regimen for now, reliable patient with appropriate benzo use.  - TSH with free T4 reflex; Future  - Vitamin D Deficiency; Future    Hx of gout  - Comprehensive metabolic panel (BMP + Alb, Alk Phos, ALT, AST, Total. Bili, TP); Future  - Uric acid; Future  - Allopurinol 300 mg daily    Chronic pain of both shoulders  Bilateral shoulder x-rays last visit; arthritis on the right, fairly normal imaging on the left.  Current history and exam fairly classic for impingement syndrome of left shoulder related to overuse as a .  Discussed risk/benefits of trial of corticosteroid injection.  Also tried switching to Celebrex for safety profile but ended up switching back to meloxicam due to lack of effectiveness.  She has fairly widespread arthritis pain and we did discuss risk/benefits of chronic NSAID use.  - meloxicam (MOBIC) 15 MG tablet; Take 1 tablet (15 mg) by mouth daily  - On maintenance PPI  - Will have her return at her convenience for left shoulder steroid injection    Encounter for screening mammogram for malignant neoplasm of breast  - MA Screening Bilateral w/ Nacho; Future    Screening for malignant neoplasm of cervix  - A pap thin layer screen with  HPV - recommended age 30 - 65 years    Patient has been advised of split  billing requirements and indicates understanding: Yes      COUNSELING:  Reviewed preventive health counseling, as reflected in patient instructions       Regular exercise       Healthy diet/nutrition       Vision screening       Hearing screening       Aspirin prophylaxis       Colorectal Cancer Screening       Consider Hep C screening for all patients one time for ages 18-79 years       HIV screeninx in teen years, 1x in adult years, and at intervals if high risk    She reports that she has quit smoking. She has never used smokeless tobacco.            Donaldo Seymour MD  [facility]

## 2023-07-14 ENCOUNTER — APPOINTMENT (OUTPATIENT)
Dept: LAB | Facility: OTHER | Age: 63
End: 2023-07-14
Payer: COMMERCIAL

## 2023-07-14 ENCOUNTER — OFFICE VISIT (OUTPATIENT)
Dept: FAMILY MEDICINE | Facility: OTHER | Age: 63
End: 2023-07-14
Attending: STUDENT IN AN ORGANIZED HEALTH CARE EDUCATION/TRAINING PROGRAM
Payer: COMMERCIAL

## 2023-07-14 VITALS
OXYGEN SATURATION: 100 % | DIASTOLIC BLOOD PRESSURE: 60 MMHG | SYSTOLIC BLOOD PRESSURE: 120 MMHG | HEART RATE: 80 BPM | TEMPERATURE: 97.9 F

## 2023-07-14 DIAGNOSIS — F41.9 ANXIETY: ICD-10-CM

## 2023-07-14 DIAGNOSIS — R73.01 IMPAIRED FASTING GLUCOSE: ICD-10-CM

## 2023-07-14 DIAGNOSIS — M25.511 CHRONIC PAIN OF BOTH SHOULDERS: Primary | ICD-10-CM

## 2023-07-14 DIAGNOSIS — I10 ESSENTIAL HYPERTENSION: ICD-10-CM

## 2023-07-14 DIAGNOSIS — M25.512 CHRONIC PAIN OF BOTH SHOULDERS: Primary | ICD-10-CM

## 2023-07-14 DIAGNOSIS — Z87.39 HX OF GOUT: ICD-10-CM

## 2023-07-14 DIAGNOSIS — F33.41 RECURRENT MAJOR DEPRESSIVE DISORDER, IN PARTIAL REMISSION (H): ICD-10-CM

## 2023-07-14 DIAGNOSIS — K21.00 GASTROESOPHAGEAL REFLUX DISEASE WITH ESOPHAGITIS WITHOUT HEMORRHAGE: ICD-10-CM

## 2023-07-14 DIAGNOSIS — Z00.00 ROUTINE GENERAL MEDICAL EXAMINATION AT A HEALTH CARE FACILITY: ICD-10-CM

## 2023-07-14 DIAGNOSIS — G89.29 CHRONIC PAIN OF BOTH SHOULDERS: Primary | ICD-10-CM

## 2023-07-14 LAB
ALBUMIN SERPL BCG-MCNC: 4.4 G/DL (ref 3.5–5.2)
ALP SERPL-CCNC: 104 U/L (ref 35–104)
ALT SERPL W P-5'-P-CCNC: 24 U/L (ref 0–50)
ANION GAP SERPL CALCULATED.3IONS-SCNC: 16 MMOL/L (ref 7–15)
AST SERPL W P-5'-P-CCNC: 25 U/L (ref 0–45)
BASOPHILS # BLD AUTO: 0 10E3/UL (ref 0–0.2)
BASOPHILS NFR BLD AUTO: 1 %
BILIRUB SERPL-MCNC: 0.3 MG/DL
BUN SERPL-MCNC: 28 MG/DL (ref 8–23)
CALCIUM SERPL-MCNC: 9.9 MG/DL (ref 8.8–10.2)
CHLORIDE SERPL-SCNC: 99 MMOL/L (ref 98–107)
CHOLEST SERPL-MCNC: 153 MG/DL
CREAT SERPL-MCNC: 1 MG/DL (ref 0.51–0.95)
DEPRECATED HCO3 PLAS-SCNC: 21 MMOL/L (ref 22–29)
EOSINOPHIL # BLD AUTO: 0.2 10E3/UL (ref 0–0.7)
EOSINOPHIL NFR BLD AUTO: 3 %
ERYTHROCYTE [DISTWIDTH] IN BLOOD BY AUTOMATED COUNT: 13.7 % (ref 10–15)
EST. AVERAGE GLUCOSE BLD GHB EST-MCNC: 148 MG/DL
GFR SERPL CREATININE-BSD FRML MDRD: 64 ML/MIN/1.73M2
GLUCOSE SERPL-MCNC: 148 MG/DL (ref 70–99)
HBA1C MFR BLD: 6.8 %
HCT VFR BLD AUTO: 35.4 % (ref 35–47)
HDLC SERPL-MCNC: 67 MG/DL
HGB BLD-MCNC: 11.9 G/DL (ref 11.7–15.7)
IMM GRANULOCYTES # BLD: 0 10E3/UL
IMM GRANULOCYTES NFR BLD: 0 %
LDLC SERPL CALC-MCNC: 64 MG/DL
LYMPHOCYTES # BLD AUTO: 1.7 10E3/UL (ref 0.8–5.3)
LYMPHOCYTES NFR BLD AUTO: 27 %
MCH RBC QN AUTO: 27.3 PG (ref 26.5–33)
MCHC RBC AUTO-ENTMCNC: 33.6 G/DL (ref 31.5–36.5)
MCV RBC AUTO: 81 FL (ref 78–100)
MONOCYTES # BLD AUTO: 0.6 10E3/UL (ref 0–1.3)
MONOCYTES NFR BLD AUTO: 10 %
NEUTROPHILS # BLD AUTO: 3.7 10E3/UL (ref 1.6–8.3)
NEUTROPHILS NFR BLD AUTO: 60 %
NONHDLC SERPL-MCNC: 86 MG/DL
PLATELET # BLD AUTO: 364 10E3/UL (ref 150–450)
POTASSIUM SERPL-SCNC: 4.2 MMOL/L (ref 3.4–5.3)
PROT SERPL-MCNC: 7 G/DL (ref 6.4–8.3)
RBC # BLD AUTO: 4.36 10E6/UL (ref 3.8–5.2)
SODIUM SERPL-SCNC: 136 MMOL/L (ref 136–145)
TRIGL SERPL-MCNC: 111 MG/DL
TSH SERPL DL<=0.005 MIU/L-ACNC: 1.71 UIU/ML (ref 0.3–4.2)
URATE SERPL-MCNC: 3.4 MG/DL (ref 2.4–5.7)
WBC # BLD AUTO: 6.2 10E3/UL (ref 4–11)

## 2023-07-14 PROCEDURE — 20610 DRAIN/INJ JOINT/BURSA W/O US: CPT | Mod: LT | Performed by: STUDENT IN AN ORGANIZED HEALTH CARE EDUCATION/TRAINING PROGRAM

## 2023-07-14 PROCEDURE — 83036 HEMOGLOBIN GLYCOSYLATED A1C: CPT | Performed by: STUDENT IN AN ORGANIZED HEALTH CARE EDUCATION/TRAINING PROGRAM

## 2023-07-14 PROCEDURE — 36415 COLL VENOUS BLD VENIPUNCTURE: CPT | Performed by: STUDENT IN AN ORGANIZED HEALTH CARE EDUCATION/TRAINING PROGRAM

## 2023-07-14 PROCEDURE — 80050 GENERAL HEALTH PANEL: CPT | Performed by: STUDENT IN AN ORGANIZED HEALTH CARE EDUCATION/TRAINING PROGRAM

## 2023-07-14 PROCEDURE — 82306 VITAMIN D 25 HYDROXY: CPT | Performed by: STUDENT IN AN ORGANIZED HEALTH CARE EDUCATION/TRAINING PROGRAM

## 2023-07-14 PROCEDURE — 80061 LIPID PANEL: CPT | Performed by: STUDENT IN AN ORGANIZED HEALTH CARE EDUCATION/TRAINING PROGRAM

## 2023-07-14 PROCEDURE — 84550 ASSAY OF BLOOD/URIC ACID: CPT | Performed by: STUDENT IN AN ORGANIZED HEALTH CARE EDUCATION/TRAINING PROGRAM

## 2023-07-14 RX ORDER — LIDOCAINE HYDROCHLORIDE 10 MG/ML
3 INJECTION, SOLUTION INFILTRATION; PERINEURAL ONCE
Status: COMPLETED | OUTPATIENT
Start: 2023-07-14 | End: 2023-07-14

## 2023-07-14 RX ORDER — TRIAMCINOLONE ACETONIDE 40 MG/ML
40 INJECTION, SUSPENSION INTRA-ARTICULAR; INTRAMUSCULAR ONCE
Status: COMPLETED | OUTPATIENT
Start: 2023-07-14 | End: 2023-07-14

## 2023-07-14 RX ADMIN — TRIAMCINOLONE ACETONIDE 40 MG: 40 INJECTION, SUSPENSION INTRA-ARTICULAR; INTRAMUSCULAR at 10:27

## 2023-07-14 RX ADMIN — LIDOCAINE HYDROCHLORIDE 5 ML: 10 INJECTION, SOLUTION INFILTRATION; PERINEURAL at 10:26

## 2023-07-14 ASSESSMENT — PAIN SCALES - GENERAL: PAINLEVEL: MILD PAIN (3)

## 2023-07-14 NOTE — PROGRESS NOTES
Assessment & Plan     Chronic pain of both shoulders  Recent bilateral shoulder x-rays reviewed ongoing bursitis with mild impingement symptoms on the left shoulder.  Issues with ongoing overuse and bartending.  No prior shoulder injections.  No full-thickness rotator cuff pathology on exam, cannot rule out labral injury.  Discussed with her risks/benefits of trial of steroid injection, concept of steroid flare, and tentative plan for advanced imaging if suboptimal results.  Injection tolerated without complication.  - triamcinolone (KENALOG-40) injection 40 mg  - lidocaine 1 % injection 3 mL    Subacromial Injection Note  Informed Consent:  Affirmation of informed consent was signed and scanned into the medical record. Risks, benefits and alternatives were discussed. Patient's questions were elicited and answered.     Technique:  left shoulder was prepped in the usual sterile fashion.    INJECTION:  Using 3 cc of 1% lidocaine mixed with 40 mg of kenalog, the subacromial space was successfully injected without complication.  Patient did experience some pain relief following injection.    Was entire vial of medication used? Yes    Routine general medical examination at a health care facility  Annual physical 7/20/2023 but did not have lab services at that time, will collect previously ordered labs today.    15 minutes spent by me on the date of the encounter doing chart review, history and exam, documentation and further activities per the note       Follow up as needed.    Donaldo Seymour MD  [facility]   Elsi is a 62 year old, presenting for the following health issues:  Imm/Inj (Cortisone injection shoulder)    HPI     -Returns today for shoulder injection and labs discussed at recent annual physical 7/10/2023.    Review of Systems   Constitutional, HEENT, cardiovascular, pulmonary, gi and gu systems are negative, except as otherwise noted.      Objective    /60    Pulse 80   Temp 97.9  F (36.6  C) (Tympanic)   SpO2 100%   There is no height or weight on file to calculate BMI.  Physical Exam  Vitals reviewed.   Constitutional:       Appearance: Normal appearance.   HENT:      Head: Normocephalic and atraumatic.   Musculoskeletal:      Comments: Right shoulder normal to inspection.  Nontender to palpation.  No erythema, edema, ecchymosis.  Discomfort throughout ROM.   Skin:     General: Skin is warm and dry.   Neurological:      General: No focal deficit present.      Mental Status: She is alert and oriented to person, place, and time.   Psychiatric:         Mood and Affect: Mood normal.         Behavior: Behavior normal.

## 2023-07-16 LAB — DEPRECATED CALCIDIOL+CALCIFEROL SERPL-MC: 25 UG/L (ref 20–75)

## 2023-07-17 LAB
BKR LAB AP GYN ADEQUACY: NORMAL
BKR LAB AP GYN INTERPRETATION: NORMAL
BKR LAB AP HPV REFLEX: NORMAL
BKR LAB AP PREVIOUS ABNORMAL: NORMAL
PATH REPORT.COMMENTS IMP SPEC: NORMAL
PATH REPORT.COMMENTS IMP SPEC: NORMAL
PATH REPORT.RELEVANT HX SPEC: NORMAL

## 2023-07-18 LAB
HUMAN PAPILLOMA VIRUS 16 DNA: NEGATIVE
HUMAN PAPILLOMA VIRUS 18 DNA: NEGATIVE
HUMAN PAPILLOMA VIRUS FINAL DIAGNOSIS: ABNORMAL
HUMAN PAPILLOMA VIRUS OTHER HR: POSITIVE

## 2023-07-23 ENCOUNTER — MYC REFILL (OUTPATIENT)
Dept: FAMILY MEDICINE | Facility: OTHER | Age: 63
End: 2023-07-23

## 2023-07-23 DIAGNOSIS — G89.29 CHRONIC PAIN OF BOTH SHOULDERS: Primary | ICD-10-CM

## 2023-07-23 DIAGNOSIS — M25.511 CHRONIC PAIN OF BOTH SHOULDERS: Primary | ICD-10-CM

## 2023-07-23 DIAGNOSIS — F41.9 ANXIETY: ICD-10-CM

## 2023-07-23 DIAGNOSIS — M25.512 CHRONIC PAIN OF BOTH SHOULDERS: Primary | ICD-10-CM

## 2023-07-24 RX ORDER — CYCLOBENZAPRINE HCL 10 MG
10 TABLET ORAL 3 TIMES DAILY PRN
Qty: 90 TABLET | Refills: 1 | Status: SHIPPED | OUTPATIENT
Start: 2023-07-24 | End: 2023-12-05

## 2023-07-24 RX ORDER — LORAZEPAM 0.5 MG/1
0.5 TABLET ORAL
Qty: 28 TABLET | Refills: 0 | Status: SHIPPED | OUTPATIENT
Start: 2023-07-24 | End: 2023-08-21

## 2023-07-24 NOTE — TELEPHONE ENCOUNTER
Flexeril      Last Written Prescription Date:  7/8/21  Last Fill Quantity: n/a,   # refills: n/a  Last Office Visit: 7/14/23  Future Office visit:       Routing refill request to provider for review/approval because:  Medication is reported/historical

## 2023-07-24 NOTE — TELEPHONE ENCOUNTER
Lorazepam      Last Written Prescription Date:  6/19/23  Last Fill Quantity: 28,   # refills: 0  Last Office Visit: 7/14/23  Future Office visit:

## 2023-08-08 ENCOUNTER — APPOINTMENT (OUTPATIENT)
Dept: GENERAL RADIOLOGY | Facility: HOSPITAL | Age: 63
End: 2023-08-08
Attending: NURSE PRACTITIONER
Payer: OTHER MISCELLANEOUS

## 2023-08-08 ENCOUNTER — HOSPITAL ENCOUNTER (EMERGENCY)
Facility: HOSPITAL | Age: 63
Discharge: HOME | End: 2023-08-08
Attending: NURSE PRACTITIONER | Admitting: NURSE PRACTITIONER
Payer: OTHER MISCELLANEOUS

## 2023-08-08 VITALS
TEMPERATURE: 98.7 F | DIASTOLIC BLOOD PRESSURE: 83 MMHG | SYSTOLIC BLOOD PRESSURE: 131 MMHG | RESPIRATION RATE: 16 BRPM | OXYGEN SATURATION: 96 % | HEART RATE: 86 BPM

## 2023-08-08 DIAGNOSIS — S46.912A STRAIN OF LEFT SHOULDER, INITIAL ENCOUNTER: ICD-10-CM

## 2023-08-08 PROCEDURE — G0463 HOSPITAL OUTPT CLINIC VISIT: HCPCS | Mod: 25

## 2023-08-08 PROCEDURE — 250N000011 HC RX IP 250 OP 636: Mod: JZ | Performed by: NURSE PRACTITIONER

## 2023-08-08 PROCEDURE — 73030 X-RAY EXAM OF SHOULDER: CPT | Mod: LT

## 2023-08-08 PROCEDURE — 99213 OFFICE O/P EST LOW 20 MIN: CPT | Performed by: NURSE PRACTITIONER

## 2023-08-08 PROCEDURE — 96372 THER/PROPH/DIAG INJ SC/IM: CPT | Performed by: NURSE PRACTITIONER

## 2023-08-08 RX ORDER — TIZANIDINE 2 MG/1
2 TABLET ORAL 3 TIMES DAILY PRN
Qty: 12 TABLET | Refills: 0 | Status: SHIPPED | OUTPATIENT
Start: 2023-08-08 | End: 2024-05-02

## 2023-08-08 RX ORDER — KETOROLAC TROMETHAMINE 15 MG/ML
15 INJECTION, SOLUTION INTRAMUSCULAR; INTRAVENOUS ONCE
Status: COMPLETED | OUTPATIENT
Start: 2023-08-08 | End: 2023-08-08

## 2023-08-08 RX ADMIN — KETOROLAC TROMETHAMINE 15 MG: 15 INJECTION, SOLUTION INTRAMUSCULAR; INTRAVENOUS at 19:10

## 2023-08-08 ASSESSMENT — ENCOUNTER SYMPTOMS
NECK STIFFNESS: 0
NECK PAIN: 0
NUMBNESS: 0
CHILLS: 0
ARTHRALGIAS: 1
ACTIVITY CHANGE: 1
COLOR CHANGE: 0
NAUSEA: 0
SHORTNESS OF BREATH: 0
VOMITING: 0
FEVER: 0

## 2023-08-08 ASSESSMENT — ACTIVITIES OF DAILY LIVING (ADL): ADLS_ACUITY_SCORE: 37

## 2023-08-08 NOTE — ED TRIAGE NOTES
Pt presents with shoulder pain. Was caring some pop and it hit something and jolted her now has pain .  Happened about 3 hours ago

## 2023-08-08 NOTE — ED PROVIDER NOTES
History     Chief Complaint   Patient presents with    Shoulder Pain     HPI  Elsi Krause is a 62 year old female who is accompanied per her son.  She presents with left shoulder pain that started 2 hours ago, while she was carrying a 12 pack of pop, he had the 12 pack on the edge of the counter and colin her left shoulder.  Had a cortisone injection approximately 3 weeks ago.  Has chronic shoulder pain.  No OTC medications have been taken or home interventions applied.  Former smoker.  Denies numbness and tingling in her left arm.  Denies fevers, chills, nausea, vomiting, and shortness of breath.    Musculoskeletal problem/pain    Duration: two hours ago  Description  Location: left shoulder  Intensity:  7/10 at rest 10/10 with movement  Accompanying signs and symptoms: radiation of pain to fingertips  History  Previous similar problem: YES- chronic shoulder pain  Previous evaluation:  x-ray 5/2023 at [place]  Precipitating or alleviating factors:  Trauma or overuse: YES- carrying 12 pack of pop and hit the 12 pack on the counter and colin left shoulder. Felt a pop  Aggravating factors include: movement  Therapies tried and outcome: nothing     Allergies:  Allergies   Allergen Reactions    Indomethacin Nausea and Vomiting    Prednisone Diarrhea     And shakey       Problem List:    Patient Active Problem List    Diagnosis Date Noted    Hx of gout 03/13/2023     Priority: Medium    Gastroesophageal reflux disease with esophagitis without hemorrhage 03/13/2023     Priority: Medium    Hyperlipidemia LDL goal <100 03/13/2023     Priority: Medium    Recurrent major depressive disorder, in partial remission (H) 03/13/2023     Priority: Medium    Anxiety 03/13/2023     Priority: Medium    Status post cervical spinal fusion 09/28/2017     Priority: Medium    Drug overdose, multiple drugs 04/20/2015     Priority: Medium    Cervical radicular pain 08/27/2014     Priority: Medium    Impaired fasting glucose 04/04/2012      Priority: Medium    Osteoarthritis of both feet 12/28/2011     Priority: Medium    Essential hypertension 02/07/2003     Priority: Medium     IMO Update          Past Medical History:    Past Medical History:   Diagnosis Date    ACP (advance care planning) 7/19/2016    Degenerative disc disease, lumbar     Depression     Drug overdose 4/20/2015    Hyperlipidemia     Hypertension     Leiomyoma of uterus, unspecified 9/19/2006    Osteoarthritis of ankle 8/22/2006    Urinary tract infection, site not specified 7/22/2002       Past Surgical History:    Past Surgical History:   Procedure Laterality Date    CARPAL TUNNEL RELEASE RT/LT Right     CERVICAL FUSION      COLONOSCOPY N/A 3/23/2023    Procedure: COLONOSCOPY;  Surgeon: Jose Angel Fiore MD;  Location: [facility]    ENDOMETRIAL SAMPLING (BIOPSY)  12/27/2001    FOOT SURGERY Bilateral 01/01/1973    HC EXCISE CUTANEOUS NEUROMA  11/05/1997    Times 3     TUBAL LIGATION Bilateral 04/01/1989       Family History:    Family History   Problem Relation Age of Onset    Diabetes Mother     Hypertension Mother     Hyperlipidemia Mother     Diabetes Father     Coronary Artery Disease Father     Hypertension Father     Hyperlipidemia Father     Chronic Obstructive Pulmonary Disease Father     Cancer - colorectal Father     Prostate Cancer Paternal Grandfather     Diabetes Sister     Hypertension Sister     Hyperlipidemia Sister     Neurologic Disorder Son         Cerebral palsy       Social History:  Marital Status:  Single [1]  Social History     Tobacco Use    Smoking status: Former    Smokeless tobacco: Never   Vaping Use    Vaping Use: Never used   Substance Use Topics    Alcohol use: Yes     Comment: Social    Drug use: No        Medications:    tiZANidine (ZANAFLEX) 2 MG tablet  allopurinol (ZYLOPRIM) 300 MG tablet  Aspirin 325 MG CAPS  colchicine (MITIGARE) 0.6 MG capsule  cyclobenzaprine (FLEXERIL) 10 MG tablet  lisinopril-hydrochlorothiazide (ZESTORETIC) 20-25 MG  tablet  LORazepam (ATIVAN) 0.5 MG tablet  meloxicam (MOBIC) 15 MG tablet  omeprazole (PRILOSEC) 40 MG DR capsule  rosuvastatin (CRESTOR) 20 MG tablet          Review of Systems   Constitutional:  Positive for activity change. Negative for chills and fever.   Respiratory:  Negative for shortness of breath.    Gastrointestinal:  Negative for nausea and vomiting.   Musculoskeletal:  Positive for arthralgias (left shoulder pain). Negative for neck pain and neck stiffness.   Skin:  Negative for color change.   Neurological:  Negative for numbness.       Physical Exam   BP: 131/83  Pulse: 86  Temp: 98.7  F (37.1  C)  Resp: 16  SpO2: 96 %      Physical Exam  Vitals reviewed.   Constitutional:       General: She is in acute distress (Mild to moderate).      Appearance: She is normal weight.   Cardiovascular:      Rate and Rhythm: Normal rate.   Pulmonary:      Effort: Pulmonary effort is normal.   Musculoskeletal:         General: Swelling (Mild to moderate left shoulder) present.      Right shoulder: Swelling (Mild to moderate) and tenderness (Over AC joint) present. No crepitus. Decreased range of motion. Decreased strength. Normal pulse.      Right upper arm: Tenderness present. No swelling or bony tenderness.      Cervical back: Full passive range of motion without pain. No pain with movement, spinous process tenderness or muscular tenderness.   Skin:     General: Skin is warm and dry.      Findings: No bruising or erythema.   Neurological:      Mental Status: She is alert and oriented to person, place, and time.   Psychiatric:         Behavior: Behavior normal.         ED Course                 Procedures             Results for orders placed or performed during the hospital encounter of 08/08/23 (from the past 24 hour(s))   XR Shoulder Left G/E 3 Views    Narrative    PROCEDURE:  XR SHOULDER LEFT G/E 3 VIEWS    HISTORY: pain over posterior A/c joint. carrying 12 pack pop and it  hit counter jarring her left arm. heard  a pop. had cortisone injection  around  3 weeks ago.    COMPARISON:  5/11/2023    TECHNIQUE:  Views left shoulder.    FINDINGS:  No fracture or dislocation is identified. No humeral  osteoarthritis is redemonstrated. No foreign body is seen.       Impression    IMPRESSION: Unchanged appearance of the left shoulder.    JEROME CROUCH MD         SYSTEM ID:  [id number]       Medications   ketorolac (TORADOL) injection 15 mg (15 mg Intramuscular $Given 8/8/23 1910)       Assessments & Plan (with Medical Decision Making)     I have reviewed the nursing notes.    I have reviewed the findings, diagnosis, plan and need for follow up with the patient.  (Y08.718B) Strain of left shoulder, initial encounter  Comment: 62 year old female who is accompanied per her son.  She presents with left shoulder pain that started 2 hours ago, while she was carrying a 12 pack of pop, he had the 12 pack on the edge of the counter and colin her left shoulder.  Had a cortisone injection approximately 3 weeks ago.  Has chronic shoulder pain.  No OTC medications have been taken or home interventions applied.  Former smoker.  Denies numbness and tingling in her left arm.  Denies fevers, chills, nausea, vomiting, and shortness of breath.    MDM: Minimal range of motion including internal/external rotation and abduction and flexion.  Hurts to move shoulder.  Flexion to 70 degrees.     Left shoulder x-ray from May 11, 2023 radiology report: normal left shoulder     Left shoulder x-ray reviewed and per radiology;  No fracture or dislocation is identified. No humeral osteoarthritis is redemonstrated. No foreign body is seen.     Ketorolac 15 mg given IM    Plan: Tizanidine 3 times daily as needed.  Keep affected extremity elevated as much as possible for next 24 - 48 hours. Ice to affected area 20 minutes every hour as needed for comfort. After 48 hours you can apply heat.  Tylenol 650 to 1000 mg every four to six hours as needed (not to exceed  more than 4000 mg in a 24 hour period). May use interchangeably. Suggest medicating around the clock for the next 24-48 hours. Use arm sling until you can move your left arm/shoulder without having discomfort.  Slowly start to wiggle your hand/fingers and move left arm/shoulder as often as possible but not beyond the point of pain. Follow up with primary provider or orthopedics as needed  Do not drive motor vehicles or operate heavy equipment while taking muscle relaxors.  Orthopedic  Referral placed        These discharge instructions and medications were reviewed with her and understanding verbalized.    This document was prepared using a combination of typing and voice generated software.  While every attempt was made for accuracy, spelling and grammatical errors may exist.    Discharge Medication List as of 8/8/2023  7:13 PM        START taking these medications    Details   tiZANidine (ZANAFLEX) 2 MG tablet Take 1 tablet (2 mg) by mouth 3 times daily as needed for muscle spasms, Disp-12 tablet, R-0, E-Prescribe             Final diagnoses:   Strain of left shoulder, initial encounter       8/8/2023   [facility]         Donna Stephens, CNP  08/08/23 1925

## 2023-08-08 NOTE — ED TRIAGE NOTES
"Pt presents with c/o shoulder pain  Left shoulder pain   States that she colin her shoulder and heard a \"pop\"   States that she just had a cortisone shot in that shoulder a few weeks ago.  Pain is radiating down to her fingers.   Denies numbness or tingling     No otc meds taken        "

## 2023-08-09 NOTE — DISCHARGE INSTRUCTIONS
Keep affected extremity elevated as much as possible for next 24 - 48 hours. Ice to affected area 20 minutes every hour as needed for comfort. After 48 hours you can apply heat.  Tylenol 650 to 1000 mg every four to six hours as needed (not to exceed more than 4000 mg in a 24 hour period). May use interchangeably. Suggest medicating around the clock for the next 24-48 hours. Use arm sling until you can move your left arm/shoulder without having discomfort.  Slowly start to wiggle your hand/fingers and move left arm/shoulder as often as possible but not beyond the point of pain. Follow up with primary provider or orthopedics as needed  Orthopedic referral placed  Do not drive motor vehicles or operate heavy equipment while taking muscle relaxors.

## 2023-08-10 ENCOUNTER — MYC MEDICAL ADVICE (OUTPATIENT)
Dept: FAMILY MEDICINE | Facility: OTHER | Age: 63
End: 2023-08-10

## 2023-08-10 ENCOUNTER — OFFICE VISIT (OUTPATIENT)
Dept: FAMILY MEDICINE | Facility: OTHER | Age: 63
End: 2023-08-10
Attending: FAMILY MEDICINE
Payer: OTHER MISCELLANEOUS

## 2023-08-10 VITALS
HEART RATE: 89 BPM | HEIGHT: 64 IN | OXYGEN SATURATION: 96 % | TEMPERATURE: 98.3 F | SYSTOLIC BLOOD PRESSURE: 118 MMHG | RESPIRATION RATE: 18 BRPM | WEIGHT: 162.6 LBS | DIASTOLIC BLOOD PRESSURE: 86 MMHG | BODY MASS INDEX: 27.76 KG/M2

## 2023-08-10 DIAGNOSIS — G89.29 CHRONIC LEFT SHOULDER PAIN: Primary | ICD-10-CM

## 2023-08-10 DIAGNOSIS — M25.512 CHRONIC LEFT SHOULDER PAIN: Primary | ICD-10-CM

## 2023-08-10 PROCEDURE — 99214 OFFICE O/P EST MOD 30 MIN: CPT | Performed by: FAMILY MEDICINE

## 2023-08-10 RX ORDER — HYDROCODONE BITARTRATE AND ACETAMINOPHEN 5; 325 MG/1; MG/1
1 TABLET ORAL EVERY 6 HOURS PRN
Qty: 12 TABLET | Refills: 0 | Status: SHIPPED | OUTPATIENT
Start: 2023-08-10 | End: 2023-08-13

## 2023-08-10 ASSESSMENT — PAIN SCALES - GENERAL: PAINLEVEL: MODERATE PAIN (5)

## 2023-08-10 NOTE — PROGRESS NOTES
"  Assessment & Plan     Chronic left shoulder pain  - HYDROcodone-acetaminophen (NORCO) 5-325 MG tablet; Take 1 tablet by mouth every 6 hours as needed for severe pain  - MR Shoulder Left w/o Contrast; Future    MRI to eval for tear.      JENNIFER ADAMS, DO  [facility] - RONNA Calzada is a 62 year old, presenting for the following health issues:  Musculoskeletal Problem (Left Shoulder)      HPI     Chronic shoulder pain, acute worsening a couple days ago lifting 12 pack, heard a pop at the time.  Past injections not of much help.    Concern - Left Shoulder  Onset: Few months  Description: Has had previous pain in shoulder, then heard a pop at work and went in and had shot.   Intensity: severe  Progression of Symptoms:  worsening and constant  Accompanying Signs & Symptoms: Pain, loss of movement, pain down arm to hand.  Previous history of similar problem: yes  Precipitating factors:        Worsened by: Moving arm.  Alleviating factors:        Improved by: Not moving arm.  Therapies tried and outcome: Toradol injection at ER on 8/8/2023  ED/UC Followup:    Facility:  [facility] Emergency Department  Date of visit: 08/08/2023  Reason for visit: Shoulder Pain  Current Status: Continuing pain in left shoulder, loss of movement.      Review of Systems   Constitutional:  Negative for fever.   Musculoskeletal:  Negative for neck pain.            Objective    /86   Pulse 89   Temp 98.3  F (36.8  C) (Tympanic)   Resp 18   Ht 1.626 m (5' 4\")   Wt 73.8 kg (162 lb 9.6 oz)   SpO2 96%   BMI 27.91 kg/m    Body mass index is 27.91 kg/m .  Physical Exam  Constitutional:       General: She is not in acute distress.     Appearance: Normal appearance.   Cardiovascular:      Rate and Rhythm: Normal rate and regular rhythm.      Heart sounds: Normal heart sounds. No murmur heard.  Pulmonary:      Effort: Pulmonary effort is normal.      Breath sounds: Normal breath sounds. No wheezing. "   Musculoskeletal:      Comments: Pain with attempted ROM, tenderness   Neurological:      Mental Status: She is alert and oriented to person, place, and time.

## 2023-08-15 ENCOUNTER — TELEPHONE (OUTPATIENT)
Dept: FAMILY MEDICINE | Facility: OTHER | Age: 63
End: 2023-08-15

## 2023-08-15 ASSESSMENT — ENCOUNTER SYMPTOMS
FEVER: 0
NECK PAIN: 0

## 2023-08-15 NOTE — TELEPHONE ENCOUNTER
Please fax order for MRI to work [facility] and [facility].   Thank you.     Work [facility] fax number is [phone].    [facility] fax number is [phone]

## 2023-08-15 NOTE — TELEPHONE ENCOUNTER
Called pt and advised that mri needs to be authorized by work comp prior to scheduling; sent message to provider to have him sign note so I can send to work comp to get MRI authed

## 2023-08-15 NOTE — TELEPHONE ENCOUNTER
10:09 AM    Reason for Call: Phone Call    Description: Calling was calling regarding a MRI she had a question on it and she figured it out and doesn't need to speak to Dr Seymour nurse.

## 2023-08-16 ENCOUNTER — TELEPHONE (OUTPATIENT)
Dept: FAMILY MEDICINE | Facility: OTHER | Age: 63
End: 2023-08-16

## 2023-08-16 NOTE — TELEPHONE ENCOUNTER
MANTOUX TUBERCULIN (a.k.a. TB) SKIN TEST      What is Tuberculosis/TB?  Tuberculosis/TB is an infectious disease, which is spread through the air by tiny germs when people cough, sneeze, speak or sing.  TB germs are very small and can remain in the air for a long period of time. TB germs most often settle in your lungs, but may also settle in other organs of your body.  TB germs can be present in your body without making you ill.  This is called TB INFECTION.  TB infection cannot be spread to other people.  When your body’s defenses become weak and can no longer control the TB germs they multiply, this is called TB DISEASE.  It can take month(s) to year(s) for TB infection to become TB disease.  The TB SKIN TEST can show if a person has been “infected” by TB germs.    How is the Mantoux Tuberculin/TB skin test given?  A very small amount of a product called Purified Protein Derivative (PPD) is injected just under the top layer of the skin on the forearm.  Persons who have been infected by the TB germs usually react to the test by developing swelling at the injection site.  The skin test results must be read 48 to 72 hours after given.  Failure to return within this time frame means the test will need to be repeated.    Side effects…  Side effects from a skin test are rare and may include itching and discomfort at the injection site and very rarely the possibility of a blister, ulceration or necrosis (dead tissue) at the site if the injection.    Return to:  Any [facility] site on March 8th, after  8 a.m. and before 6:30 p.m. on March 8th to have your test read.   10:47 AM    Reason for Call: Phone Call    Description: WC Adjustor called, needs MRI orders faxed to # [phone]     Was an appointment offered for this call? No    Preferred method for responding to this message: Telephone Call  What is your phone number [phone]    If we cannot reach you directly, may we leave a detailed response at the number you provided? Yes    Can this message wait until your PCP/provider returns, if available today? Not applicable    Stephanie Hodges

## 2023-08-18 ENCOUNTER — TRANSFERRED RECORDS (OUTPATIENT)
Dept: HEALTH INFORMATION MANAGEMENT | Facility: CLINIC | Age: 63
End: 2023-08-18
Payer: COMMERCIAL

## 2023-08-21 ENCOUNTER — MYC REFILL (OUTPATIENT)
Dept: FAMILY MEDICINE | Facility: OTHER | Age: 63
End: 2023-08-21

## 2023-08-21 ENCOUNTER — TELEPHONE (OUTPATIENT)
Dept: FAMILY MEDICINE | Facility: OTHER | Age: 63
End: 2023-08-21

## 2023-08-21 DIAGNOSIS — F41.9 ANXIETY: ICD-10-CM

## 2023-08-21 RX ORDER — LORAZEPAM 0.5 MG/1
0.5 TABLET ORAL
Qty: 28 TABLET | Refills: 0 | Status: SHIPPED | OUTPATIENT
Start: 2023-08-21 | End: 2023-09-18

## 2023-08-21 NOTE — TELEPHONE ENCOUNTER
Lorazepam      Last Written Prescription Date:  7/24/23  Last Fill Quantity: 28,   # refills: 0  Last Office Visit: 7/14/23  Future Office visit:

## 2023-08-22 ENCOUNTER — TELEPHONE (OUTPATIENT)
Dept: FAMILY MEDICINE | Facility: OTHER | Age: 63
End: 2023-08-22

## 2023-08-22 NOTE — TELEPHONE ENCOUNTER
Donaldo Seymour MD   to Me   ISATU    8/22/23  4:39 PM   Do not have outside results yet.  Would not anticipate them for several days.  She can call and request to have records pushed to me sooner if she would like.  Thank you.

## 2023-08-22 NOTE — TELEPHONE ENCOUNTER
Patient called for status of MRI results below. Patient asking for message to be sent high priority.

## 2023-08-22 NOTE — TELEPHONE ENCOUNTER
Pt called back she is still having shoulder pain, and that she is still waiting for MRI results. Wanting to know if she can get into day for the pain. [phone] is a good number to call her back at

## 2023-08-22 NOTE — TELEPHONE ENCOUNTER
Patient is calling wanting to get MRI results from  nurse. Please call patient and give results to patient at [phone].

## 2023-08-24 ENCOUNTER — TELEPHONE (OUTPATIENT)
Dept: FAMILY MEDICINE | Facility: OTHER | Age: 63
End: 2023-08-24

## 2023-08-24 DIAGNOSIS — G89.29 CHRONIC LEFT SHOULDER PAIN: Primary | ICD-10-CM

## 2023-08-24 DIAGNOSIS — M25.512 CHRONIC LEFT SHOULDER PAIN: Primary | ICD-10-CM

## 2023-08-24 NOTE — TELEPHONE ENCOUNTER
Please let patient know that I received her MRI report and I'm placing a referral to Ortho so they can see her to make treatment recommendations.    She appears to have some tears, arthritis, and bursitis.

## 2023-08-25 ENCOUNTER — OFFICE VISIT (OUTPATIENT)
Dept: FAMILY MEDICINE | Facility: OTHER | Age: 63
End: 2023-08-25
Attending: STUDENT IN AN ORGANIZED HEALTH CARE EDUCATION/TRAINING PROGRAM
Payer: OTHER MISCELLANEOUS

## 2023-08-25 VITALS
SYSTOLIC BLOOD PRESSURE: 110 MMHG | DIASTOLIC BLOOD PRESSURE: 60 MMHG | BODY MASS INDEX: 27.81 KG/M2 | TEMPERATURE: 97.4 F | WEIGHT: 162 LBS | OXYGEN SATURATION: 98 % | HEART RATE: 70 BPM

## 2023-08-25 DIAGNOSIS — M19.012 ARTHROPATHY OF LEFT SHOULDER: Primary | ICD-10-CM

## 2023-08-25 PROCEDURE — 99213 OFFICE O/P EST LOW 20 MIN: CPT | Performed by: STUDENT IN AN ORGANIZED HEALTH CARE EDUCATION/TRAINING PROGRAM

## 2023-08-25 ASSESSMENT — PAIN SCALES - GENERAL: PAINLEVEL: MODERATE PAIN (4)

## 2023-08-25 NOTE — PROGRESS NOTES
Occupational Visit     SUBJECTIVE:  Elsi Krause, 62 year old, female is seen for follow up of occupational injury. Date of injury is 8/8/23.    No linked episodes    Musculoskeletal problem/pain    Duration: 8/8/23  Description  Location: left shoulder  Intensity:  10/10 at worst  Accompanying signs and symptoms: radiation of pain to down to finger  History  Previous similar problem: no   Previous evaluation:  MRI  Precipitating or alleviating factors:  Trauma or overuse: YES  Aggravating factors include: standing, lifting, and overuse  Therapies tried and outcome: ice and pain meds    -Here to review MRI results from 8/18/2023  -Needs travel exemption note to get refund from airlines  -Ongoing significant discomfort without change  -Ortho referral placed yesterday    Allergies   Allergen Reactions    Indomethacin Nausea and Vomiting    Prednisone Diarrhea     And shakey     Review of Systems:  Constitutional, HEENT, cardiovascular, pulmonary, gi and gu systems are negative, except as otherwise noted.      OBJECTIVE:  Vitals:    08/25/23 1035   BP: 110/60   Pulse: 70   Temp: 97.4  F (36.3  C)   SpO2: 98%       Exam:  GENERAL: healthy, alert and no distress  SKIN: no suspicious lesions, no rashes  NEURO: strength and tone- normal, sensory exam- grossly normal, mentation- intact, speech- normal, reflexes- symmetric  PSYCH: Alert and oriented times 3; speech- coherent , normal rate and volume; able to articulate logical thoughts, able to abstract reason, no tangential thoughts, no hallucinations or delusions, affect- normal      Labs: No results found for this or any previous visit (from the past 24 hour(s)).      ASSESSMENT/PLAN:  Arthropathy of left shoulder  Acute injury 8/8/2023 at work.  Reviewed 8/18/2023 MRI notable for full-thickness tear supraspinatus tendon with atrophy, infraspinatus atrophy, biceps tendon tendinopathy with possible partial tear, osteoarthritic changes, and labral information.  Discussed  anticipation for surgical management.  - Orthopedics referral placed 8/24/2023  - Traveling restrictions letter provided    Donaldo Seymour MD  [facility]

## 2023-08-25 NOTE — LETTER
August 25, 2023      Elsi PENNINGTON [address]  [address]   [facility] [address]        To Whom It May Concern:    Elsi PENNINGTON Nleida was seen on 8/25/23. Please excuse her from any scheduled flights or travel for the time being for medical exemption due to injury. Thank you for your attention to this matter.        Sincerely,        Donaldo Seymour MD

## 2023-09-07 ENCOUNTER — TELEPHONE (OUTPATIENT)
Dept: FAMILY MEDICINE | Facility: OTHER | Age: 63
End: 2023-09-07

## 2023-09-07 NOTE — TELEPHONE ENCOUNTER
Form received [employer] form for WC and work insurance. Placed in provider's wall bin.   After form is completed patient would like form to be called when ready and she will .

## 2023-09-11 ENCOUNTER — MYC MEDICAL ADVICE (OUTPATIENT)
Dept: FAMILY MEDICINE | Facility: OTHER | Age: 63
End: 2023-09-11

## 2023-09-13 NOTE — TELEPHONE ENCOUNTER
Patient will drop off another form to be filed out by Dr. Seymour so patient can get paid. It is to be filled out by provider , please do not lose this one per patient call when ready.

## 2023-09-18 ENCOUNTER — MYC REFILL (OUTPATIENT)
Dept: FAMILY MEDICINE | Facility: OTHER | Age: 63
End: 2023-09-18

## 2023-09-18 DIAGNOSIS — F41.9 ANXIETY: ICD-10-CM

## 2023-09-20 ENCOUNTER — MYC REFILL (OUTPATIENT)
Dept: FAMILY MEDICINE | Facility: OTHER | Age: 63
End: 2023-09-20

## 2023-09-20 DIAGNOSIS — F41.9 ANXIETY: ICD-10-CM

## 2023-09-20 RX ORDER — LORAZEPAM 0.5 MG/1
0.5 TABLET ORAL
Qty: 28 TABLET | Refills: 0 | Status: SHIPPED | OUTPATIENT
Start: 2023-09-20 | End: 2023-10-02

## 2023-09-20 NOTE — TELEPHONE ENCOUNTER
Lorazepam 0.5 mg       Last Written Prescription Date:  8/21/23  Last Fill Quantity: 28,   # refills: 0  Last Office Visit: 8/25/23  Future Office visit:       Routing refill request to provider for review/approval because:  Drug not on the [facility], [facility] or [facility] refill protocol or controlled substance

## 2023-09-27 RX ORDER — LORAZEPAM 0.5 MG/1
0.5 TABLET ORAL
Qty: 28 TABLET | Refills: 0 | OUTPATIENT
Start: 2023-09-27

## 2023-09-27 NOTE — PATIENT INSTRUCTIONS
-No aspirin for 7 days before procedure  -No NSAIDs including meloxicam for least 5 days before your procedure  -No over-the-counter herbal supplements for at least 7 days before procedure  -May use acetaminophen in the remainder of your medications as prescribed  -Nothing to eat or drink after midnight before procedure    Preparing for Your Surgery  Getting started  A nurse will call you to review your health history and instructions. They will give you an arrival time based on your scheduled surgery time. Please be ready to share:  Your doctor's clinic name and phone number  Your medical, surgical, and anesthesia history  A list of allergies and sensitivities  A list of medicines, including herbal treatments and over-the-counter drugs  Whether the patient has a legal guardian (ask how to send us the papers in advance)  Please tell us if you're pregnant--or if there's any chance you might be pregnant. Some surgeries may injure a fetus (unborn baby), so they require a pregnancy test. Surgeries that are safe for a fetus don't always need a test, and you can choose whether to have one.   If you have a child who's having surgery, please ask for a copy of Preparing for Your Child's Surgery.    Preparing for surgery  Within 10 to 30 days of surgery: Have a pre-op exam (sometimes called an H&P, or History and Physical). This can be done at a clinic or pre-operative center.  If you're having a , you may not need this exam. Talk to your care team.  At your pre-op exam, talk to your care team about all medicines you take. If you need to stop any medicines before surgery, ask when to start taking them again.  We do this for your safety. Many medicines can make you bleed too much during surgery. Some change how well surgery (anesthesia) drugs work.  Call your insurance company to let them know you're having surgery. (If you don't have insurance, call [phone].)  Call your clinic if there's any change in your  health. This includes signs of a cold or flu (sore throat, runny nose, cough, rash, fever). It also includes a scrape or scratch near the surgery site.  If you have questions on the day of surgery, call your hospital or surgery center.  Eating and drinking guidelines  For your safety: Unless your surgeon tells you otherwise, follow the guidelines below.  Eat and drink as usual until 8 hours before you arrive for surgery. After that, no food or milk.  Drink clear liquids until 2 hours before you arrive. These are liquids you can see through, like water, Gatorade, and Propel Water. They also include plain black coffee and tea (no cream or milk), candy, and breath mints. You can spit out gum when you arrive.  If you drink alcohol: Stop drinking it the night before surgery.  If your care team tells you to take medicine on the morning of surgery, it's okay to take it with a sip of water.  Preventing infection  Shower or bathe the night before and morning of your surgery. Follow the instructions your clinic gave you. (If no instructions, use regular soap.)  Don't shave or clip hair near your surgery site. We'll remove the hair if needed.  Don't smoke or vape the morning of surgery. You may chew nicotine gum up to 2 hours before surgery. A nicotine patch is okay.  Note: Some surgeries require you to completely quit smoking and nicotine. Check with your surgeon.  Your care team will make every effort to keep you safe from infection. We will:  Clean our hands often with soap and water (or an alcohol-based hand rub).  Clean the skin at your surgery site with a special soap that kills germs.  Give you a special gown to keep you warm. (Cold raises the risk of infection.)  Wear special hair covers, masks, gowns and gloves during surgery.  Give antibiotic medicine, if prescribed. Not all surgeries need antibiotics.  What to bring on the day of surgery  Photo ID and insurance card  Copy of your health care directive, if you have  one  Glasses and hearing aids (bring cases)  You can't wear contacts during surgery  Inhaler and eye drops, if you use them (tell us about these when you arrive)  CPAP machine or breathing device, if you use them  A few personal items, if spending the night  If you have . . .  A pacemaker, ICD (cardiac defibrillator) or other implant: Bring the ID card.  An implanted stimulator: Bring the remote control.  A legal guardian: Bring a copy of the certified (court-stamped) guardianship papers.  Please remove any jewelry, including body piercings. Leave jewelry and other valuables at home.  If you're going home the day of surgery  You must have a responsible adult drive you home. They should stay with you overnight as well.  If you don't have someone to stay with you, and you aren't safe to go home alone, we may keep you overnight. Insurance often won't pay for this.  After surgery  If it's hard to control your pain or you need more pain medicine, please call your surgeon's office.  Questions?   If you have any questions for your care team, list them here: _________________________________________________________________________________________________________________________________________________________________________ ____________________________________ ____________________________________ ____________________________________  For informational purposes only. Not to replace the advice of your health care provider. Copyright   2003, 2019 [facility]. All rights reserved. Clinically reviewed by Gina Smith MD. [employer] [id number] - REV 12/22.

## 2023-09-27 NOTE — TELEPHONE ENCOUNTER
Lorazepam (Ativan) 0.5 MG tablet     Last Written Prescription Date:  09/20/2023  Last Fill Quantity: 28,   # refills: 0  Last Office Visit: 08/25/2023  Future Office visit:    Next 5 appointments (look out 90 days)      Oct 02, 2023  9:45 AM  (Arrive by 9:30 AM)  Pre-Op physical with Donaldo Seymour MD  [facility] ) [address] [address]  [phone]             Routing refill request to provider for review/approval because:  Drug not on the [facility], [facility] or  Health refill protocol or controlled substance

## 2023-09-28 ENCOUNTER — MYC REFILL (OUTPATIENT)
Dept: FAMILY MEDICINE | Facility: OTHER | Age: 63
End: 2023-09-28

## 2023-09-28 DIAGNOSIS — K21.00 GASTROESOPHAGEAL REFLUX DISEASE WITH ESOPHAGITIS WITHOUT HEMORRHAGE: ICD-10-CM

## 2023-09-28 DIAGNOSIS — G89.29 CHRONIC PAIN OF BOTH SHOULDERS: ICD-10-CM

## 2023-09-28 DIAGNOSIS — M25.511 CHRONIC PAIN OF BOTH SHOULDERS: ICD-10-CM

## 2023-09-28 DIAGNOSIS — M25.512 CHRONIC PAIN OF BOTH SHOULDERS: ICD-10-CM

## 2023-09-29 RX ORDER — MELOXICAM 15 MG/1
15 TABLET ORAL DAILY
Qty: 90 TABLET | Refills: 0 | OUTPATIENT
Start: 2023-09-29

## 2023-09-29 RX ORDER — OMEPRAZOLE 40 MG/1
40 CAPSULE, DELAYED RELEASE ORAL DAILY
Qty: 90 CAPSULE | Refills: 2 | Status: SHIPPED | OUTPATIENT
Start: 2023-09-29 | End: 2024-06-10

## 2023-09-29 NOTE — TELEPHONE ENCOUNTER
Omeprazole (Prilosec) 40 MG DR capsule     Last Written Prescription Date:  05/31/2023  Last Fill Quantity: 30,   # refills: 0  Last Office Visit: 08/25/2023

## 2023-09-29 NOTE — TELEPHONE ENCOUNTER
Senait      Last Written Prescription Date:  7.10.23  Last Fill Quantity: #90,   # refills: 0  Last Office Visit: 8.25.23  Future Office visit:    Next 5 appointments (look out 90 days)      Oct 02, 2023  9:45 AM  (Arrive by 9:30 AM)  Pre-Op physical with Donaldo Seymour MD  [facility] [address] [facility] [address]  [phone]             Routing refill request to provider for review/approval because:

## 2023-10-02 ENCOUNTER — TELEPHONE (OUTPATIENT)
Dept: FAMILY MEDICINE | Facility: OTHER | Age: 63
End: 2023-10-02

## 2023-10-02 ENCOUNTER — OFFICE VISIT (OUTPATIENT)
Dept: FAMILY MEDICINE | Facility: OTHER | Age: 63
End: 2023-10-02
Attending: STUDENT IN AN ORGANIZED HEALTH CARE EDUCATION/TRAINING PROGRAM
Payer: OTHER MISCELLANEOUS

## 2023-10-02 VITALS
DIASTOLIC BLOOD PRESSURE: 85 MMHG | HEART RATE: 83 BPM | TEMPERATURE: 98.3 F | WEIGHT: 172.3 LBS | OXYGEN SATURATION: 94 % | SYSTOLIC BLOOD PRESSURE: 130 MMHG | BODY MASS INDEX: 29.58 KG/M2

## 2023-10-02 DIAGNOSIS — M25.512 CHRONIC PAIN OF BOTH SHOULDERS: ICD-10-CM

## 2023-10-02 DIAGNOSIS — M25.511 CHRONIC PAIN OF BOTH SHOULDERS: ICD-10-CM

## 2023-10-02 DIAGNOSIS — Z87.39 HX OF GOUT: ICD-10-CM

## 2023-10-02 DIAGNOSIS — K21.00 GASTROESOPHAGEAL REFLUX DISEASE WITH ESOPHAGITIS WITHOUT HEMORRHAGE: ICD-10-CM

## 2023-10-02 DIAGNOSIS — M19.012 ARTHROPATHY OF LEFT SHOULDER: ICD-10-CM

## 2023-10-02 DIAGNOSIS — F33.41 RECURRENT MAJOR DEPRESSIVE DISORDER, IN PARTIAL REMISSION (H): ICD-10-CM

## 2023-10-02 DIAGNOSIS — Z01.818 PREOP GENERAL PHYSICAL EXAM: Primary | ICD-10-CM

## 2023-10-02 DIAGNOSIS — I10 ESSENTIAL HYPERTENSION: ICD-10-CM

## 2023-10-02 DIAGNOSIS — E11.9 TYPE 2 DIABETES MELLITUS WITHOUT COMPLICATION, WITHOUT LONG-TERM CURRENT USE OF INSULIN (H): ICD-10-CM

## 2023-10-02 DIAGNOSIS — E78.5 HYPERLIPIDEMIA LDL GOAL <100: ICD-10-CM

## 2023-10-02 DIAGNOSIS — G89.29 CHRONIC PAIN OF BOTH SHOULDERS: ICD-10-CM

## 2023-10-02 DIAGNOSIS — F41.9 ANXIETY: ICD-10-CM

## 2023-10-02 LAB
ANION GAP SERPL CALCULATED.3IONS-SCNC: 13 MMOL/L (ref 7–15)
BASOPHILS # BLD AUTO: 0 10E3/UL (ref 0–0.2)
BASOPHILS NFR BLD AUTO: 1 %
BUN SERPL-MCNC: 23.8 MG/DL (ref 8–23)
CALCIUM SERPL-MCNC: 10.1 MG/DL (ref 8.8–10.2)
CHLORIDE SERPL-SCNC: 100 MMOL/L (ref 98–107)
CREAT SERPL-MCNC: 1.06 MG/DL (ref 0.51–0.95)
DEPRECATED HCO3 PLAS-SCNC: 23 MMOL/L (ref 22–29)
EGFRCR SERPLBLD CKD-EPI 2021: 59 ML/MIN/1.73M2
EOSINOPHIL # BLD AUTO: 0.2 10E3/UL (ref 0–0.7)
EOSINOPHIL NFR BLD AUTO: 3 %
ERYTHROCYTE [DISTWIDTH] IN BLOOD BY AUTOMATED COUNT: 14.2 % (ref 10–15)
GLUCOSE SERPL-MCNC: 134 MG/DL (ref 70–99)
HCT VFR BLD AUTO: 34.3 % (ref 35–47)
HGB BLD-MCNC: 11.9 G/DL (ref 11.7–15.7)
IMM GRANULOCYTES # BLD: 0.1 10E3/UL
IMM GRANULOCYTES NFR BLD: 1 %
LYMPHOCYTES # BLD AUTO: 2.2 10E3/UL (ref 0.8–5.3)
LYMPHOCYTES NFR BLD AUTO: 31 %
MCH RBC QN AUTO: 28.7 PG (ref 26.5–33)
MCHC RBC AUTO-ENTMCNC: 34.7 G/DL (ref 31.5–36.5)
MCV RBC AUTO: 83 FL (ref 78–100)
MONOCYTES # BLD AUTO: 0.6 10E3/UL (ref 0–1.3)
MONOCYTES NFR BLD AUTO: 9 %
NEUTROPHILS # BLD AUTO: 4 10E3/UL (ref 1.6–8.3)
NEUTROPHILS NFR BLD AUTO: 56 %
PLATELET # BLD AUTO: 316 10E3/UL (ref 150–450)
POTASSIUM SERPL-SCNC: 4.6 MMOL/L (ref 3.4–5.3)
RBC # BLD AUTO: 4.15 10E6/UL (ref 3.8–5.2)
SODIUM SERPL-SCNC: 136 MMOL/L (ref 135–145)
WBC # BLD AUTO: 7.2 10E3/UL (ref 4–11)

## 2023-10-02 PROCEDURE — 99214 OFFICE O/P EST MOD 30 MIN: CPT | Performed by: STUDENT IN AN ORGANIZED HEALTH CARE EDUCATION/TRAINING PROGRAM

## 2023-10-02 PROCEDURE — 85025 COMPLETE CBC W/AUTO DIFF WBC: CPT | Performed by: STUDENT IN AN ORGANIZED HEALTH CARE EDUCATION/TRAINING PROGRAM

## 2023-10-02 PROCEDURE — 80048 BASIC METABOLIC PNL TOTAL CA: CPT | Performed by: STUDENT IN AN ORGANIZED HEALTH CARE EDUCATION/TRAINING PROGRAM

## 2023-10-02 PROCEDURE — 93000 ELECTROCARDIOGRAM COMPLETE: CPT | Mod: 77 | Performed by: HOSPITALIST

## 2023-10-02 PROCEDURE — 36415 COLL VENOUS BLD VENIPUNCTURE: CPT | Performed by: STUDENT IN AN ORGANIZED HEALTH CARE EDUCATION/TRAINING PROGRAM

## 2023-10-02 RX ORDER — ROSUVASTATIN CALCIUM 20 MG/1
20 TABLET, COATED ORAL DAILY
Qty: 90 TABLET | Refills: 1 | Status: SHIPPED | OUTPATIENT
Start: 2023-10-02 | End: 2024-03-25

## 2023-10-02 RX ORDER — LORAZEPAM 0.5 MG/1
0.5 TABLET ORAL
Qty: 28 TABLET | Refills: 0 | Status: SHIPPED | OUTPATIENT
Start: 2023-10-12 | End: 2023-10-18

## 2023-10-02 RX ORDER — MELOXICAM 15 MG/1
15 TABLET ORAL DAILY
Qty: 90 TABLET | Refills: 0 | Status: SHIPPED | OUTPATIENT
Start: 2023-10-02 | End: 2024-01-08

## 2023-10-02 ASSESSMENT — ANXIETY QUESTIONNAIRES
3. WORRYING TOO MUCH ABOUT DIFFERENT THINGS: NEARLY EVERY DAY
7. FEELING AFRAID AS IF SOMETHING AWFUL MIGHT HAPPEN: NOT AT ALL
GAD7 TOTAL SCORE: 14
IF YOU CHECKED OFF ANY PROBLEMS ON THIS QUESTIONNAIRE, HOW DIFFICULT HAVE THESE PROBLEMS MADE IT FOR YOU TO DO YOUR WORK, TAKE CARE OF THINGS AT HOME, OR GET ALONG WITH OTHER PEOPLE: NOT DIFFICULT AT ALL
5. BEING SO RESTLESS THAT IT IS HARD TO SIT STILL: MORE THAN HALF THE DAYS
4. TROUBLE RELAXING: NEARLY EVERY DAY
6. BECOMING EASILY ANNOYED OR IRRITABLE: NOT AT ALL
2. NOT BEING ABLE TO STOP OR CONTROL WORRYING: NEARLY EVERY DAY
GAD7 TOTAL SCORE: 14
1. FEELING NERVOUS, ANXIOUS, OR ON EDGE: NEARLY EVERY DAY

## 2023-10-02 ASSESSMENT — PAIN SCALES - GENERAL: PAINLEVEL: MILD PAIN (3)

## 2023-10-02 ASSESSMENT — PATIENT HEALTH QUESTIONNAIRE - PHQ9: SUM OF ALL RESPONSES TO PHQ QUESTIONS 1-9: 6

## 2023-10-02 NOTE — PROGRESS NOTES
[place] [address] [address]  [facility] [address]  Phone: [phone]  Primary Provider: Donaldo Seymour  Pre-op Performing Provider: DONALDO SEYMOUR      PREOPERATIVE EVALUATION:  Today's date: 10/2/2023    Elsi is a 63 year old female who presents for a preoperative evaluation.      10/2/2023     9:31 AM   Additional Questions   Roomed by Gal Pruett   Accompanied by None         10/2/2023     9:31 AM   Patient Reported Additional Medications   Patient reports taking the following new medications None       Surgical Information:  Surgery/Procedure: Left Shoulder Surgery   Surgery Location: [facility]   Surgeon: Dr. Alcaraz   Surgery Date: 10/12/2023  Time of Surgery: TBD  Where patient plans to recover: At home with family  Fax number for surgical facility: TBD    Assessment & Plan     The proposed surgical procedure is considered INTERMEDIATE risk.    Preop general physical exam  Arthropathy of left shoulder  Acute shoulder injury 8/8/23 with subsequent MRI notable for full-thickness rotator cuff tear, biceps tendinopathy degenerative changes, and labral inflammation.  No interval change in symptoms.  No prior surgical/anesthesia complications.  Otherwise at baseline health and optimized for proposed procedure.  - EKG 12-lead complete w/read - Clinics  - CBC with platelets and differential; Future  - Basic metabolic panel; Future    Gastroesophageal reflux disease with esophagitis without hemorrhage  Stable on PPI maintenance therapy.  - EKG 12-lead complete w/read - Clinics  - CBC with platelets and differential; Future  - Basic metabolic panel; Future  - Omeprazole 40 mg daily    Type 2 diabetes mellitus without complication, without long-term current use of insulin (H)  Recent A1c 6.8%.  Has been working on lifestyle management.  - Basic metabolic panel; Future  - ASA/statin/ACE  - Non-smoker    Hyperlipidemia LDL goal <100  At goal.  - rosuvastatin (CRESTOR) 20 MG tablet;  Take 1 tablet (20 mg) by mouth daily    Essential hypertension  Controlled.  Asymptomatic.  - EKG 12-lead complete w/read - Clinics  - CBC with platelets and differential; Future  - Basic metabolic panel; Future  - Zestoretic 20-25 mg    Recurrent major depressive disorder, in partial remission (H24)  Anxiety  Stable.  - LORazepam (ATIVAN) 0.5 MG tablet; Take 1 tablet (0.5 mg) by mouth nightly as needed for anxiety    Hx of gout  - Allopurinol 300 mg    Chronic pain of both shoulders  - meloxicam (MOBIC) 15 MG tablet; Take 1 tablet (15 mg) by mouth daily     - No identified additional risk factors other than previously addressed    Antiplatelet or Anticoagulation Medication Instructions:   - aspirin: Discontinue aspirin 7-10 days prior to procedure to reduce bleeding risk. It should be resumed postoperatively.     Additional Medication Instructions:  Patient is to take all scheduled medications on the day of surgery EXCEPT for modifications listed below:  -No aspirin for 7 days before procedure  -No NSAIDs including meloxicam for least 5 days before your procedure  -No over-the-counter herbal supplements for at least 7 days before procedure  -May use acetaminophen in the remainder of your medications as prescribed  -Nothing to eat or drink after midnight before procedure    RECOMMENDATION:  APPROVAL GIVEN to proceed with proposed procedure, without further diagnostic evaluation.    I spent a total of 32 minutes on the day of the visit.   Time spent by me doing chart review, history and exam, documentation and further activities per the note      Subjective       HPI related to upcoming procedure:   -Acute on chronic left shoulder pain  -8/18/2023 MRI  -No recent change in symptoms  -No prior surgical/anesthesia complications  -No recent illness        10/2/2023     9:29 AM   Preop Questions   1. Have you ever had a heart attack or stroke? No   2. Have you ever had surgery on your heart or blood vessels, such as a  stent placement, a coronary artery bypass, or surgery on an artery in your head, neck, heart, or legs? No   3. Do you have chest pain with activity? No   4. Do you have a history of  heart failure? No   5. Do you currently have a cold, bronchitis or symptoms of other infection? No   6. Do you have a cough, shortness of breath, or wheezing? No   7. Do you or anyone in your family have previous history of blood clots? No   8. Do you or does anyone in your family have a serious bleeding problem such as prolonged bleeding following surgeries or cuts? No   9. Have you ever had problems with anemia or been told to take iron pills? No   10. Have you had any abnormal blood loss such as black, tarry or bloody stools, or abnormal vaginal bleeding? No   11. Have you ever had a blood transfusion? No   12. Are you willing to have a blood transfusion if it is medically needed before, during, or after your surgery? Yes   13. Have you or any of your relatives ever had problems with anesthesia? No   14. Do you have sleep apnea, excessive snoring or daytime drowsiness? No   15. Do you have any artifical heart valves or other implanted medical devices like a pacemaker, defibrillator, or continuous glucose monitor? No   16. Do you have artificial joints? No   17. Are you allergic to latex? No       Health Care Directive:  Patient does not have a Health Care Directive or Living Will: Discussed advance care planning with patient; however, patient declined at this time.    Preoperative Review of :   reviewed - controlled substances reflected in medication list.      Review of Systems  CONSTITUTIONAL: NEGATIVE for fever, chills, change in weight  INTEGUMENTARY/SKIN: NEGATIVE for worrisome rashes, moles or lesions  EYES: NEGATIVE for vision changes or irritation  ENT/MOUTH: NEGATIVE for ear, mouth and throat problems  RESP: NEGATIVE for significant cough or SOB  CV: NEGATIVE for chest pain, palpitations or peripheral edema  GI:  NEGATIVE for nausea, abdominal pain, heartburn, or change in bowel habits  : NEGATIVE for frequency, dysuria, or hematuria  MUSCULOSKELETAL: Positive for bilateral shoulder pain  NEURO: NEGATIVE for weakness, dizziness or paresthesias  ENDOCRINE: NEGATIVE for temperature intolerance, skin/hair changes  HEME: NEGATIVE for bleeding problems  PSYCHIATRIC: NEGATIVE for changes in mood or affect, positive for poor sleep    Patient Active Problem List    Diagnosis Date Noted    Diabetes mellitus, type 2 (H) 10/02/2023     Priority: Medium    Hx of gout 03/13/2023     Priority: Medium    Gastroesophageal reflux disease with esophagitis without hemorrhage 03/13/2023     Priority: Medium    Hyperlipidemia LDL goal <100 03/13/2023     Priority: Medium    Recurrent major depressive disorder, in partial remission (H24) 03/13/2023     Priority: Medium    Anxiety 03/13/2023     Priority: Medium    Status post cervical spinal fusion 09/28/2017     Priority: Medium    Drug overdose, multiple drugs 04/20/2015     Priority: Medium    Cervical radicular pain 08/27/2014     Priority: Medium    Impaired fasting glucose 04/04/2012     Priority: Medium    Osteoarthritis of both feet 12/28/2011     Priority: Medium    Essential hypertension 02/07/2003     Priority: Medium     IMO Update        Past Medical History:   Diagnosis Date    ACP (advance care planning) 7/19/2016    Advance Care Planning 7/19/2016: ACP Review of Chart / Resources Provided:  Reviewed chart for advance care plan.  Elsi Krause has no plan or code status on file. Discussed available resources and provided with information. Confirmed code status reflects current choices pending further ACP discussions.  Confirmed/documented legally designated decision makers.  Added by CASTILLO REYNA       Degenerative disc disease, lumbar     Depression     Drug overdose 4/20/2015    Hyperlipidemia     Hypertension     Leiomyoma of uterus, unspecified 9/19/2006    IMO Update 10/11     Osteoarthritis of ankle 8/22/2006    IMO Update 10/11    Urinary tract infection, site not specified 7/22/2002    Also 9/11/01, 11/18/04 IMO Update 10/11     Past Surgical History:   Procedure Laterality Date    CARPAL TUNNEL RELEASE RT/LT Right     CERVICAL FUSION      COLONOSCOPY N/A 3/23/2023    Procedure: COLONOSCOPY;  Surgeon: Jose Angel Fiore MD;  Location: [facility]    ENDOMETRIAL SAMPLING (BIOPSY)  12/27/2001    FOOT SURGERY Bilateral 01/01/1973    HC EXCISE CUTANEOUS NEUROMA  11/05/1997    Times 3     TUBAL LIGATION Bilateral 04/01/1989     Current Outpatient Medications   Medication Sig Dispense Refill    allopurinol (ZYLOPRIM) 300 MG tablet Take 1 tablet (300 mg) by mouth daily 90 tablet 3    Aspirin 325 MG CAPS Take 325 mg by mouth daily      colchicine (MITIGARE) 0.6 MG capsule Take 0.6 mg by mouth daily      cyclobenzaprine (FLEXERIL) 10 MG tablet Take 1 tablet (10 mg) by mouth 3 times daily as needed for muscle spasms 90 tablet 1    lisinopril-hydrochlorothiazide (ZESTORETIC) 20-25 MG tablet Take 1 tablet by mouth daily 90 tablet 3    LORazepam (ATIVAN) 0.5 MG tablet Take 1 tablet (0.5 mg) by mouth nightly as needed for anxiety 28 tablet 0    meloxicam (MOBIC) 15 MG tablet Take 1 tablet (15 mg) by mouth daily 90 tablet 0    omeprazole (PRILOSEC) 40 MG DR capsule Take 1 capsule by mouth once daily 90 capsule 2    rosuvastatin (CRESTOR) 20 MG tablet Take 1 tablet (20 mg) by mouth daily 90 tablet 0    tiZANidine (ZANAFLEX) 2 MG tablet Take 1 tablet (2 mg) by mouth 3 times daily as needed for muscle spasms (Patient not taking: Reported on 10/2/2023) 12 tablet 0       Allergies   Allergen Reactions    Indomethacin Nausea and Vomiting    Prednisone Diarrhea     And shakey        Social History     Tobacco Use    Smoking status: Former     Passive exposure: Past    Smokeless tobacco: Never   Substance Use Topics    Alcohol use: Yes     Comment: Social     Family History   Problem Relation Age of Onset     Diabetes Mother     Hypertension Mother     Hyperlipidemia Mother     Diabetes Father     Coronary Artery Disease Father     Hypertension Father     Hyperlipidemia Father     Chronic Obstructive Pulmonary Disease Father     Cancer - colorectal Father     Prostate Cancer Paternal Grandfather     Diabetes Sister     Hypertension Sister     Hyperlipidemia Sister     Neurologic Disorder Son         Cerebral palsy     History   Drug Use No         Objective     /85 (BP Location: Right arm, Patient Position: Sitting, Cuff Size: Adult Regular)   Pulse 83   Temp 98.3  F (36.8  C) (Tympanic)   Wt 78.2 kg (172 lb 4.8 oz)   SpO2 94%   BMI 29.58 kg/m      Physical Exam    GENERAL APPEARANCE: healthy, alert and no distress     EYES: EOMI, PERRL     HENT: ear canals and TM's normal and nose and mouth without ulcers or lesions     NECK: no adenopathy, no asymmetry, masses, or scars and thyroid normal to palpation     RESP: lungs clear to auscultation - no rales, rhonchi or wheezes     CV: regular rates and rhythm, normal S1 S2, no S3 or S4 and no murmur, click or rub     ABDOMEN:  soft, nontender, no HSM or masses and bowel sounds normal     MS: extremities normal- no gross deformities noted, no evidence of inflammation in joints, FROM in all extremities.     SKIN: no suspicious lesions or rashes     NEURO: Normal strength and tone, sensory exam grossly normal, mentation intact and speech normal     PSYCH: mentation appears normal. and affect normal/bright     LYMPHATICS: No cervical adenopathy    Recent Labs   Lab Test 07/14/23  0921   HGB 11.9         POTASSIUM 4.2   CR 1.00*   A1C 6.8*      Diagnostics:  Recent Results (from the past 24 hour(s))   Basic metabolic panel    Collection Time: 10/02/23 10:04 AM   Result Value Ref Range    Sodium 136 135 - 145 mmol/L    Potassium 4.6 3.4 - 5.3 mmol/L    Chloride 100 98 - 107 mmol/L    Carbon Dioxide (CO2) 23 22 - 29 mmol/L    Anion Gap 13 7 - 15 mmol/L    Urea  Nitrogen 23.8 (H) 8.0 - 23.0 mg/dL    Creatinine 1.06 (H) 0.51 - 0.95 mg/dL    GFR Estimate 59 (L) >60 mL/min/1.73m2    Calcium 10.1 8.8 - 10.2 mg/dL    Glucose 134 (H) 70 - 99 mg/dL   CBC with platelets and differential    Collection Time: 10/02/23 10:04 AM   Result Value Ref Range    WBC Count 7.2 4.0 - 11.0 10e3/uL    RBC Count 4.15 3.80 - 5.20 10e6/uL    Hemoglobin 11.9 11.7 - 15.7 g/dL    Hematocrit 34.3 (L) 35.0 - 47.0 %    MCV 83 78 - 100 fL    MCH 28.7 26.5 - 33.0 pg    MCHC 34.7 31.5 - 36.5 g/dL    RDW 14.2 10.0 - 15.0 %    Platelet Count 316 150 - 450 10e3/uL    % Neutrophils 56 %    % Lymphocytes 31 %    % Monocytes 9 %    % Eosinophils 3 %    % Basophils 1 %    % Immature Granulocytes 1 %    Absolute Neutrophils 4.0 1.6 - 8.3 10e3/uL    Absolute Lymphocytes 2.2 0.8 - 5.3 10e3/uL    Absolute Monocytes 0.6 0.0 - 1.3 10e3/uL    Absolute Eosinophils 0.2 0.0 - 0.7 10e3/uL    Absolute Basophils 0.0 0.0 - 0.2 10e3/uL    Absolute Immature Granulocytes 0.1 <=0.4 10e3/uL   EKG 12-lead complete w/read - Clinics    Collection Time: 10/02/23 10:09 AM   Result Value Ref Range    Systolic Blood Pressure  mmHg    Diastolic Blood Pressure  mmHg    Ventricular Rate 77 BPM    Atrial Rate 77 BPM    AK Interval 182 ms    QRS Duration 88 ms     ms    QTc 445 ms    P Axis 60 degrees    R AXIS 35 degrees    T Axis 48 degrees    Interpretation ECG       Sinus rhythm  Normal ECG  No previous ECGs available        EKG: appears normal, NSR, normal axis, normal intervals, no acute ST/T changes c/w ischemia, no LVH by voltage criteria, unchanged from previous tracings (compared to 4/20/2023).    Revised Cardiac Risk Index (RCRI):  The patient has the following serious cardiovascular risks for perioperative complications:   - No serious cardiac risks = 0 points     RCRI Interpretation: 0 points: Class I (very low risk - 0.4% complication rate)     Signed Electronically by: Donaldo Seymour MD  Copy of this evaluation report is  provided to requesting physician.

## 2023-10-05 LAB
ATRIAL RATE - MUSE: 77 BPM
DIASTOLIC BLOOD PRESSURE - MUSE: NORMAL MMHG
INTERPRETATION ECG - MUSE: NORMAL
P AXIS - MUSE: 60 DEGREES
PR INTERVAL - MUSE: 182 MS
QRS DURATION - MUSE: 88 MS
QT - MUSE: 394 MS
QTC - MUSE: 445 MS
R AXIS - MUSE: 35 DEGREES
SYSTOLIC BLOOD PRESSURE - MUSE: NORMAL MMHG
T AXIS - MUSE: 48 DEGREES
VENTRICULAR RATE- MUSE: 77 BPM

## 2023-10-18 ENCOUNTER — MYC REFILL (OUTPATIENT)
Dept: FAMILY MEDICINE | Facility: OTHER | Age: 63
End: 2023-10-18

## 2023-10-18 DIAGNOSIS — F41.9 ANXIETY: ICD-10-CM

## 2023-10-20 RX ORDER — LORAZEPAM 0.5 MG/1
0.5 TABLET ORAL
Qty: 28 TABLET | Refills: 0 | Status: SHIPPED | OUTPATIENT
Start: 2023-10-20 | End: 2023-11-27

## 2023-10-20 NOTE — TELEPHONE ENCOUNTER
ATIVAN      Last Written Prescription Date:  10-12-23  Last Fill Quantity: 28,   # refills: 0  Last Office Visit: 10-2-23  Future Office visit:       Routing refill request to provider for review/approval because:  Drug not on the [facility], P or [facility] refill protocol or controlled substance

## 2023-11-27 ENCOUNTER — MYC REFILL (OUTPATIENT)
Dept: FAMILY MEDICINE | Facility: OTHER | Age: 63
End: 2023-11-27

## 2023-11-27 DIAGNOSIS — F41.9 ANXIETY: ICD-10-CM

## 2023-11-29 RX ORDER — LORAZEPAM 0.5 MG/1
0.5 TABLET ORAL
Qty: 28 TABLET | Refills: 0 | Status: SHIPPED | OUTPATIENT
Start: 2023-11-29 | End: 2024-01-18

## 2023-11-29 NOTE — TELEPHONE ENCOUNTER
ativan      Last Written Prescription Date:  10-20-23  Last Fill Quantity: 28,   # refills: 0  Last Office Visit: 10-2-23  Future Office visit:       Routing refill request to provider for review/approval because:  Drug not on the [facility], P or [facility] refill protocol or controlled substance

## 2023-12-04 DIAGNOSIS — M25.512 CHRONIC PAIN OF BOTH SHOULDERS: ICD-10-CM

## 2023-12-04 DIAGNOSIS — M25.511 CHRONIC PAIN OF BOTH SHOULDERS: ICD-10-CM

## 2023-12-04 DIAGNOSIS — G89.29 CHRONIC PAIN OF BOTH SHOULDERS: ICD-10-CM

## 2023-12-05 RX ORDER — CYCLOBENZAPRINE HCL 10 MG
10 TABLET ORAL 3 TIMES DAILY PRN
Qty: 90 TABLET | Refills: 0 | Status: SHIPPED | OUTPATIENT
Start: 2023-12-05 | End: 2024-04-22

## 2023-12-05 NOTE — TELEPHONE ENCOUNTER
FLEXERIL      Last Written Prescription Date:  7-24-23  Last Fill Quantity: 90,   # refills: 1  Last Office Visit: 10-2-23  Future Office visit:       Routing refill request to provider for review/approval because:  Drug not on the [facility], P or [facility] refill protocol or controlled substance

## 2023-12-10 ENCOUNTER — HEALTH MAINTENANCE LETTER (OUTPATIENT)
Age: 63
End: 2023-12-10

## 2024-01-07 DIAGNOSIS — G89.29 CHRONIC PAIN OF BOTH SHOULDERS: ICD-10-CM

## 2024-01-07 DIAGNOSIS — M25.511 CHRONIC PAIN OF BOTH SHOULDERS: ICD-10-CM

## 2024-01-07 DIAGNOSIS — M25.512 CHRONIC PAIN OF BOTH SHOULDERS: ICD-10-CM

## 2024-01-08 RX ORDER — MELOXICAM 15 MG/1
15 TABLET ORAL DAILY
Qty: 90 TABLET | Refills: 0 | Status: SHIPPED | OUTPATIENT
Start: 2024-01-08 | End: 2024-04-22

## 2024-01-08 NOTE — TELEPHONE ENCOUNTER
Meloxicam      Last Written Prescription Date:  10/2/23  Last Fill Quantity: 90,   # refills: 0  Last Office Visit: 10/2/23  Future Office visit:       Routing refill request to provider for review/approval because:

## 2024-01-18 DIAGNOSIS — F41.9 ANXIETY: ICD-10-CM

## 2024-01-18 RX ORDER — LORAZEPAM 0.5 MG/1
0.5 TABLET ORAL
Qty: 28 TABLET | Refills: 0 | Status: SHIPPED | OUTPATIENT
Start: 2024-01-18 | End: 2024-02-19

## 2024-01-18 NOTE — TELEPHONE ENCOUNTER
LORazepam (ATIVAN) 0.5 MG tablet       Last Written Prescription Date:  11/29/23  Last Fill Quantity: 28,   # refills: 0  Last Office Visit: 10/2/23  Future Office visit:       Routing refill request to provider for review/approval because:

## 2024-02-18 DIAGNOSIS — F41.9 ANXIETY: ICD-10-CM

## 2024-02-19 RX ORDER — LORAZEPAM 0.5 MG/1
0.5 TABLET ORAL
Qty: 28 TABLET | Refills: 0 | Status: SHIPPED | OUTPATIENT
Start: 2024-02-19 | End: 2024-03-26

## 2024-02-19 NOTE — TELEPHONE ENCOUNTER
Lorazepam 0.5 mg       Last Written Prescription Date:  1/18/24  Last Fill Quantity: 28,   # refills: 0  Last Office Visit: 10/2/23  Future Office visit:       Routing refill request to provider for review/approval because:  Drug not on the [facility], P or [facility] refill protocol or controlled substance

## 2024-02-21 RX ORDER — OXYCODONE HYDROCHLORIDE 5 MG/1
TABLET ORAL
COMMUNITY
Start: 2023-10-12 | End: 2024-03-21

## 2024-02-21 NOTE — PROGRESS NOTES
"  Assessment & Plan     (R05.9) Cough, unspecified type  (primary encounter diagnosis)  Comment: check viral swab, chest XRAY, and CBC   Plan: Symptomatic Influenza A/B, RSV, & SARS-CoV2 PCR        (COVID-19), XR CHEST 2 VW (Clinic Performed),         CBC with platelets and differential            (J06.9) Upper respiratory tract infection, unspecified type  Comment: treat with Prednisone. She did have Prednisone listed as an allergy and she requested that I remove Prednisone from her chart as a listed allergy. She has some diarrhea prior when she took Prednisone and when she took Prednisone for a longer course she felt shaky. Albuterol inhaler. Tessalon and Robitussin AC for cough. Supportive care discussed   Plan: albuterol (PROAIR HFA/PROVENTIL HFA/VENTOLIN         HFA) 108 (90 Base) MCG/ACT inhaler, benzonatate        (TESSALON) 200 MG capsule, Streptococcus A         Rapid Screen w/Reflex to PCR, predniSONE         (DELTASONE) 20 MG tablet, guaiFENesin-codeine         (ROBITUSSIN AC) 100-10 MG/5ML solution, Group A        Streptococcus PCR Throat Swab            (J02.9) Acute pharyngitis, unspecified etiology  Comment: check strep swab   Plan: Streptococcus A Rapid Screen w/Reflex to PCR              BMI  Estimated body mass index is 27.92 kg/m  as calculated from the following:    Height as of this encounter: 1.651 m (5' 5\").    Weight as of this encounter: 76.1 kg (167 lb 12.8 oz).   Weight management plan: Discussed healthy diet and exercise guidelines      See Patient Instructions    Return if symptoms worsen or fail to improve.    Martha Calzada is a 63 year old, presenting for the following health issues:  Cold Symptoms        2/22/2024    10:35 AM   Additional Questions   Roomed by Megan LOPEZ     HPI     Acute Illness  Acute illness concerns:  Onset/Duration: x 2 days  Symptoms:  Fever: YES  Chills/Sweats: YES  Headache (location?): YES  Sinus Pressure: YES  Conjunctivitis:  No  Ear Pain: YES: " "bilateral  Rhinorrhea: YES  Congestion: YES  Sore Throat: YES  Cough: YES-productive of yellow sputum, with shortness of breath  Wheeze: YES  Decreased Appetite: YES  Nausea: No  Vomiting: No  Diarrhea: No  Dysuria/Freq.: No  Dysuria or Hematuria: No  Fatigue/Achiness: No  Sick/Strep Exposure: No  Therapies tried and outcome: OTC medications        Review of Systems  Constitutional, HEENT, cardiovascular, pulmonary, GI, , musculoskeletal, neuro, skin, endocrine and psych systems are negative, except as otherwise noted.      Objective    /72 (BP Location: Right arm, Patient Position: Sitting, Cuff Size: Adult Regular)   Pulse 91   Temp 99.1  F (37.3  C) (Tympanic)   Ht 1.651 m (5' 5\")   Wt 76.1 kg (167 lb 12.8 oz)   SpO2 97%   BMI 27.92 kg/m    Body mass index is 27.92 kg/m .  Physical Exam   GENERAL: alert and no distress  HENT: ear canals and TM's normal, nose and mouth without ulcers or lesions. +posterior oropharynx with erythema without exudate. No PTA   NECK: no adenopathy, no asymmetry, masses, or scars  RESP: no rales or rhonchi. +scattered wheezes   CV: regular rate and rhythm, normal S1 S2, no S3 or S4, no murmur, click or rub, no peripheral edema  MS: no gross musculoskeletal defects noted, no edema  SKIN: no suspicious lesions or rashes  PSYCH: mentation appears normal, affect normal/bright      Signed Electronically by: RAMA Mcdonald CNP    "

## 2024-02-22 ENCOUNTER — OFFICE VISIT (OUTPATIENT)
Dept: FAMILY MEDICINE | Facility: OTHER | Age: 64
End: 2024-02-22
Attending: NURSE PRACTITIONER
Payer: COMMERCIAL

## 2024-02-22 ENCOUNTER — ANCILLARY PROCEDURE (OUTPATIENT)
Dept: GENERAL RADIOLOGY | Facility: OTHER | Age: 64
End: 2024-02-22
Attending: NURSE PRACTITIONER
Payer: COMMERCIAL

## 2024-02-22 VITALS
SYSTOLIC BLOOD PRESSURE: 132 MMHG | BODY MASS INDEX: 27.96 KG/M2 | HEART RATE: 91 BPM | DIASTOLIC BLOOD PRESSURE: 72 MMHG | OXYGEN SATURATION: 97 % | HEIGHT: 65 IN | WEIGHT: 167.8 LBS | TEMPERATURE: 99.1 F

## 2024-02-22 DIAGNOSIS — J02.9 ACUTE PHARYNGITIS, UNSPECIFIED ETIOLOGY: ICD-10-CM

## 2024-02-22 DIAGNOSIS — R05.9 COUGH, UNSPECIFIED TYPE: Primary | ICD-10-CM

## 2024-02-22 DIAGNOSIS — J06.9 UPPER RESPIRATORY TRACT INFECTION, UNSPECIFIED TYPE: ICD-10-CM

## 2024-02-22 DIAGNOSIS — R05.9 COUGH, UNSPECIFIED TYPE: ICD-10-CM

## 2024-02-22 LAB
BASOPHILS # BLD AUTO: 0 10E3/UL (ref 0–0.2)
BASOPHILS NFR BLD AUTO: 0 %
DEPRECATED S PYO AG THROAT QL EIA: NEGATIVE
EOSINOPHIL # BLD AUTO: 0.2 10E3/UL (ref 0–0.7)
EOSINOPHIL NFR BLD AUTO: 3 %
ERYTHROCYTE [DISTWIDTH] IN BLOOD BY AUTOMATED COUNT: 14.5 % (ref 10–15)
FLUAV RNA SPEC QL NAA+PROBE: NEGATIVE
FLUBV RNA RESP QL NAA+PROBE: NEGATIVE
GROUP A STREP BY PCR: NOT DETECTED
HCT VFR BLD AUTO: 34 % (ref 35–47)
HGB BLD-MCNC: 11.4 G/DL (ref 11.7–15.7)
IMM GRANULOCYTES # BLD: 0 10E3/UL
IMM GRANULOCYTES NFR BLD: 0 %
LYMPHOCYTES # BLD AUTO: 1.5 10E3/UL (ref 0.8–5.3)
LYMPHOCYTES NFR BLD AUTO: 22 %
MCH RBC QN AUTO: 27.1 PG (ref 26.5–33)
MCHC RBC AUTO-ENTMCNC: 33.5 G/DL (ref 31.5–36.5)
MCV RBC AUTO: 81 FL (ref 78–100)
MONOCYTES # BLD AUTO: 0.6 10E3/UL (ref 0–1.3)
MONOCYTES NFR BLD AUTO: 9 %
NEUTROPHILS # BLD AUTO: 4.4 10E3/UL (ref 1.6–8.3)
NEUTROPHILS NFR BLD AUTO: 65 %
PLATELET # BLD AUTO: 304 10E3/UL (ref 150–450)
RBC # BLD AUTO: 4.21 10E6/UL (ref 3.8–5.2)
RSV RNA SPEC NAA+PROBE: POSITIVE
SARS-COV-2 RNA RESP QL NAA+PROBE: NEGATIVE
WBC # BLD AUTO: 6.7 10E3/UL (ref 4–11)

## 2024-02-22 PROCEDURE — 85025 COMPLETE CBC W/AUTO DIFF WBC: CPT | Performed by: NURSE PRACTITIONER

## 2024-02-22 PROCEDURE — 71046 X-RAY EXAM CHEST 2 VIEWS: CPT | Mod: TC | Performed by: RADIOLOGY

## 2024-02-22 PROCEDURE — 87651 STREP A DNA AMP PROBE: CPT | Performed by: NURSE PRACTITIONER

## 2024-02-22 PROCEDURE — 99213 OFFICE O/P EST LOW 20 MIN: CPT | Performed by: NURSE PRACTITIONER

## 2024-02-22 PROCEDURE — 36415 COLL VENOUS BLD VENIPUNCTURE: CPT | Performed by: NURSE PRACTITIONER

## 2024-02-22 PROCEDURE — 87637 SARSCOV2&INF A&B&RSV AMP PRB: CPT | Performed by: NURSE PRACTITIONER

## 2024-02-22 RX ORDER — BENZONATATE 200 MG/1
200 CAPSULE ORAL 3 TIMES DAILY PRN
Qty: 30 CAPSULE | Refills: 0 | Status: SHIPPED | OUTPATIENT
Start: 2024-02-22 | End: 2024-03-21

## 2024-02-22 RX ORDER — ALBUTEROL SULFATE 90 UG/1
2 AEROSOL, METERED RESPIRATORY (INHALATION) EVERY 6 HOURS PRN
Qty: 18 G | Refills: 0 | Status: SHIPPED | OUTPATIENT
Start: 2024-02-22 | End: 2024-09-30

## 2024-02-22 RX ORDER — PREDNISONE 20 MG/1
40 TABLET ORAL DAILY
Qty: 10 TABLET | Refills: 0 | Status: SHIPPED | OUTPATIENT
Start: 2024-02-22 | End: 2024-02-27

## 2024-02-22 RX ORDER — CODEINE PHOSPHATE AND GUAIFENESIN 10; 100 MG/5ML; MG/5ML
1-2 SOLUTION ORAL EVERY 4 HOURS PRN
Qty: 118 ML | Refills: 0 | Status: SHIPPED | OUTPATIENT
Start: 2024-02-22 | End: 2024-03-21

## 2024-02-22 ASSESSMENT — PAIN SCALES - GENERAL: PAINLEVEL: MILD PAIN (3)

## 2024-03-18 NOTE — PROGRESS NOTES
Assessment & Plan     (R93.89) Abnormal chest x-ray  (primary encounter diagnosis)  Comment: Repeat chest XRAY. Fissure noted on lateral view on 2/22/24 when she had RSV. No fissure seen on XRAY review today or report from radiologist. This was reviewed with Elsi. She is feeling much better from RSV infection   Plan: XR CHEST 2 VW (Clinic Performed)            See Patient Instructions    Return if symptoms worsen or fail to improve.    Subjective   Elsi is a 63 year old, presenting for the following health issues:  RECHECK    HPI     Patient would like to discuss recent results from chest xray.        Review of Systems  Constitutional, HEENT, cardiovascular, pulmonary, gi and gu systems are negative, except as otherwise noted.      Objective    /72   Pulse 82   Temp 98.3  F (36.8  C) (Tympanic)   Wt 74.8 kg (165 lb)   SpO2 95%   BMI 27.46 kg/m    Body mass index is 27.46 kg/m .  Physical Exam   GENERAL: alert and no distress  NECK: no adenopathy, no asymmetry, masses, or scars  RESP: lungs clear to auscultation - no rales, rhonchi or wheezes  CV: regular rate and rhythm, normal S1 S2, no S3 or S4, no murmur, click or rub, no peripheral edema  SKIN: no suspicious lesions or rashes  PSYCH: mentation appears normal, affect normal/bright      Signed Electronically by: RAMA Mcdonald CNP

## 2024-03-21 ENCOUNTER — ANCILLARY PROCEDURE (OUTPATIENT)
Dept: GENERAL RADIOLOGY | Facility: OTHER | Age: 64
End: 2024-03-21
Attending: NURSE PRACTITIONER
Payer: COMMERCIAL

## 2024-03-21 ENCOUNTER — OFFICE VISIT (OUTPATIENT)
Dept: FAMILY MEDICINE | Facility: OTHER | Age: 64
End: 2024-03-21
Attending: NURSE PRACTITIONER
Payer: COMMERCIAL

## 2024-03-21 VITALS
SYSTOLIC BLOOD PRESSURE: 124 MMHG | WEIGHT: 165 LBS | BODY MASS INDEX: 27.46 KG/M2 | DIASTOLIC BLOOD PRESSURE: 72 MMHG | HEART RATE: 82 BPM | TEMPERATURE: 98.3 F | OXYGEN SATURATION: 95 %

## 2024-03-21 DIAGNOSIS — R93.89 ABNORMAL CHEST X-RAY: Primary | ICD-10-CM

## 2024-03-21 DIAGNOSIS — R93.89 ABNORMAL CHEST X-RAY: ICD-10-CM

## 2024-03-21 PROCEDURE — 71046 X-RAY EXAM CHEST 2 VIEWS: CPT | Mod: TC | Performed by: RADIOLOGY

## 2024-03-21 PROCEDURE — 99213 OFFICE O/P EST LOW 20 MIN: CPT | Performed by: NURSE PRACTITIONER

## 2024-03-21 ASSESSMENT — PAIN SCALES - GENERAL: PAINLEVEL: NO PAIN (0)

## 2024-03-23 DIAGNOSIS — E78.5 HYPERLIPIDEMIA LDL GOAL <100: ICD-10-CM

## 2024-03-25 RX ORDER — ROSUVASTATIN CALCIUM 20 MG/1
20 TABLET, COATED ORAL DAILY
Qty: 90 TABLET | Refills: 1 | Status: SHIPPED | OUTPATIENT
Start: 2024-03-25 | End: 2024-09-16

## 2024-03-26 DIAGNOSIS — F41.9 ANXIETY: ICD-10-CM

## 2024-03-26 RX ORDER — LORAZEPAM 0.5 MG/1
0.5 TABLET ORAL
Qty: 28 TABLET | Refills: 0 | Status: SHIPPED | OUTPATIENT
Start: 2024-03-26 | End: 2024-04-22

## 2024-03-26 NOTE — TELEPHONE ENCOUNTER
Ativan 0.5 mg      Last Written Prescription Date:  2/19/24  Last Fill Quantity: 28,   # refills: 0  Last Office Visit: 3/21/24  Future Office visit:       Routing refill request to provider for review/approval because:  Drug not on the [facility], P or [facility] refill protocol or controlled substance

## 2024-04-22 DIAGNOSIS — M25.512 CHRONIC PAIN OF BOTH SHOULDERS: ICD-10-CM

## 2024-04-22 DIAGNOSIS — M25.511 CHRONIC PAIN OF BOTH SHOULDERS: ICD-10-CM

## 2024-04-22 DIAGNOSIS — F41.9 ANXIETY: ICD-10-CM

## 2024-04-22 DIAGNOSIS — G89.29 CHRONIC PAIN OF BOTH SHOULDERS: ICD-10-CM

## 2024-04-22 RX ORDER — MELOXICAM 15 MG/1
15 TABLET ORAL DAILY
Qty: 90 TABLET | Refills: 0 | Status: SHIPPED | OUTPATIENT
Start: 2024-04-22 | End: 2024-07-24

## 2024-04-22 RX ORDER — LORAZEPAM 0.5 MG/1
0.5 TABLET ORAL
Qty: 28 TABLET | Refills: 0 | Status: SHIPPED | OUTPATIENT
Start: 2024-04-23 | End: 2024-05-23

## 2024-04-22 RX ORDER — CYCLOBENZAPRINE HCL 10 MG
10 TABLET ORAL 3 TIMES DAILY PRN
Qty: 90 TABLET | Refills: 0 | Status: SHIPPED | OUTPATIENT
Start: 2024-04-22 | End: 2024-07-12

## 2024-04-22 NOTE — TELEPHONE ENCOUNTER
Flexeril      Last Written Prescription Date:  12/05/2023  Last Fill Quantity: 90,   # refills: 0  Last Office Visit: 3/21/2024  Future Office visit:      Ativan       Last Written Prescription Date:  3/26/2024  Last Fill Quantity: 28.   # refills: 0  Last Office Visit: 3/21/2024  Future Office visit:      Mobic       Last Written Prescription Date:  1/08/2024  Last Fill Quantity: 90,   # refills: 0  Last Office Visit: 3/21/2024  Future Office visit:    Next 5 appointments (look out 90 days)      May 02, 2024 11:30 AM  (Arrive by 11:15 AM)  Provider Visit with Donaldo Seymour MD  [facility] [place] ([facility] [place] ) [address]  [place] [address]  [phone]

## 2024-04-28 ENCOUNTER — HEALTH MAINTENANCE LETTER (OUTPATIENT)
Age: 64
End: 2024-04-28

## 2024-04-30 NOTE — PROGRESS NOTES
Assessment & Plan     Type 2 diabetes mellitus without complication, without long-term current use of insulin (H)  Last A1c 6.8% new concerns.  Currently on lifestyle management.  - Hemoglobin A1c  - Basic metabolic panel  - Albumin Random Urine Quantitative with Creat Ratio; Future  - Recommend adding metformin if A1c increases  - ASA/statin/ACE  - Due for annual eye exam in July  - Non-smoker    Hyperlipidemia LDL goal <100  - Annual lipid screen at physical  - Crestor 20 mg daily    Essential hypertension  Controlled.  - Basic metabolic panel  - Lisinopril-hydrochlorothiazide    Restless leg syndrome  Sounds classic for RLS, no red flag symptoms.  Has had mild anemia with lower MCV in the past, discussed possible iron deficiency as contributing source.  Checking labs as below and anticipate trial of adding MWF iron supplementation.  Already uses lorazepam most evenings, also has Flexeril.  - Hemoglobin  - Iron and iron binding capacity  - Ferritin  - Consider trial of ropinirole down the road if needed    I spent a total of 34 minutes on the day of the visit.   Time spent by me doing chart review, history and exam, documentation and further activities per the note    Follow-up 3 months for annual physical/fasting labs.    Martha Calzada is a 63 year old, presenting for the following health issues:  Diabetes        5/2/2024    11:12 AM   Additional Questions   Roomed by Yury Wellington LPN   Accompanied by Self         5/2/2024    11:12 AM   Patient Reported Additional Medications   Patient reports taking the following new medications None     History of Present Illness       Diabetes:   She presents for follow up of diabetes.    She is not checking blood glucose.         She has no concerns regarding her diabetes at this time.   She is not experiencing numbness or burning in feet, excessive thirst, blurry vision, weight changes or redness, sores or blisters on feet.           She eats 0-1 servings of fruits  "and vegetables daily.She consumes 0 sweetened beverage(s) daily.She exercises with enough effort to increase her heart rate 60 or more minutes per day.  She exercises with enough effort to increase her heart rate 5 days per week.   She is taking medications regularly.       Diabetes Follow-up  How often are you checking your blood sugar? Not at all  What concerns do you have today about your diabetes? None   Do you have any of these symptoms? (Select all that apply)  No numbness or tingling in feet.  No redness, sores or blisters on feet.  No complaints of excessive thirst.  No reports of blurry vision.  No significant changes to weight.      BP Readings from Last 2 Encounters:   05/02/24 126/82   03/21/24 124/72     Hemoglobin A1C (%)   Date Value   07/14/2023 6.8 (H)     LDL Cholesterol Calculated (mg/dL)   Date Value   07/14/2023 64           Concern - Restless Legs at night  Onset: About 6 months or longer  Description: When relaxing at night legs start jumping.   Intensity: moderate  Progression of Symptoms:  same  Accompanying Signs & Symptoms: None  Previous history of similar problem: No  Precipitating factors:        Worsened by: None  Alleviating factors:        Improved by: None  Therapies tried and outcome: None  -Probably worse after work days  -Also get some charley horses after long workday  -Not painful, just unsettled  -No falls or specific injuries    Review of Systems  Constitutional, HEENT, cardiovascular, pulmonary, gi and gu systems are negative, except as otherwise noted.      Objective    /82   Pulse 82   Temp 98  F (36.7  C) (Tympanic)   Resp 16   Ht 1.651 m (5' 5\")   Wt 73.6 kg (162 lb 4.8 oz)   SpO2 95%   BMI 27.01 kg/m    Body mass index is 27.01 kg/m .  Physical Exam  Vitals reviewed.   Constitutional:       General: She is not in acute distress.     Appearance: Normal appearance. She is not ill-appearing or toxic-appearing.   HENT:      Head: Normocephalic and atraumatic. "   Cardiovascular:      Rate and Rhythm: Normal rate and regular rhythm.      Heart sounds: Normal heart sounds.   Pulmonary:      Breath sounds: Normal breath sounds.   Musculoskeletal:         General: Normal range of motion.      Right lower leg: No edema.      Left lower leg: No edema.   Skin:     General: Skin is warm and dry.   Neurological:      General: No focal deficit present.      Mental Status: She is alert and oriented to person, place, and time.   Psychiatric:         Mood and Affect: Mood normal.         Behavior: Behavior normal.        Signed Electronically by: Donaldo Seymour MD

## 2024-05-02 ENCOUNTER — OFFICE VISIT (OUTPATIENT)
Dept: FAMILY MEDICINE | Facility: OTHER | Age: 64
End: 2024-05-02
Attending: STUDENT IN AN ORGANIZED HEALTH CARE EDUCATION/TRAINING PROGRAM
Payer: COMMERCIAL

## 2024-05-02 VITALS
OXYGEN SATURATION: 95 % | WEIGHT: 162.3 LBS | BODY MASS INDEX: 27.04 KG/M2 | HEART RATE: 82 BPM | TEMPERATURE: 98 F | SYSTOLIC BLOOD PRESSURE: 126 MMHG | HEIGHT: 65 IN | DIASTOLIC BLOOD PRESSURE: 82 MMHG | RESPIRATION RATE: 16 BRPM

## 2024-05-02 DIAGNOSIS — E11.9 TYPE 2 DIABETES MELLITUS WITHOUT COMPLICATION, WITHOUT LONG-TERM CURRENT USE OF INSULIN (H): Primary | ICD-10-CM

## 2024-05-02 DIAGNOSIS — E78.5 HYPERLIPIDEMIA LDL GOAL <100: ICD-10-CM

## 2024-05-02 DIAGNOSIS — I10 ESSENTIAL HYPERTENSION: ICD-10-CM

## 2024-05-02 DIAGNOSIS — G25.81 RESTLESS LEG SYNDROME: ICD-10-CM

## 2024-05-02 LAB
ANION GAP SERPL CALCULATED.3IONS-SCNC: 13 MMOL/L (ref 7–15)
BUN SERPL-MCNC: 31.6 MG/DL (ref 8–23)
CALCIUM SERPL-MCNC: 10.3 MG/DL (ref 8.8–10.2)
CHLORIDE SERPL-SCNC: 100 MMOL/L (ref 98–107)
CREAT SERPL-MCNC: 1.17 MG/DL (ref 0.51–0.95)
DEPRECATED HCO3 PLAS-SCNC: 24 MMOL/L (ref 22–29)
EGFRCR SERPLBLD CKD-EPI 2021: 53 ML/MIN/1.73M2
EST. AVERAGE GLUCOSE BLD GHB EST-MCNC: 148 MG/DL
FERRITIN SERPL-MCNC: 45 NG/ML (ref 11–328)
GLUCOSE SERPL-MCNC: 147 MG/DL (ref 70–99)
HBA1C MFR BLD: 6.8 %
HGB BLD-MCNC: 11.5 G/DL (ref 11.7–15.7)
IRON BINDING CAPACITY (ROCHE): 355 UG/DL (ref 240–430)
IRON SATN MFR SERPL: 12 % (ref 15–46)
IRON SERPL-MCNC: 42 UG/DL (ref 37–145)
POTASSIUM SERPL-SCNC: 4.6 MMOL/L (ref 3.4–5.3)
SODIUM SERPL-SCNC: 137 MMOL/L (ref 135–145)

## 2024-05-02 PROCEDURE — 36415 COLL VENOUS BLD VENIPUNCTURE: CPT | Performed by: STUDENT IN AN ORGANIZED HEALTH CARE EDUCATION/TRAINING PROGRAM

## 2024-05-02 PROCEDURE — 80048 BASIC METABOLIC PNL TOTAL CA: CPT | Performed by: STUDENT IN AN ORGANIZED HEALTH CARE EDUCATION/TRAINING PROGRAM

## 2024-05-02 PROCEDURE — 83550 IRON BINDING TEST: CPT | Performed by: STUDENT IN AN ORGANIZED HEALTH CARE EDUCATION/TRAINING PROGRAM

## 2024-05-02 PROCEDURE — 85018 HEMOGLOBIN: CPT | Performed by: STUDENT IN AN ORGANIZED HEALTH CARE EDUCATION/TRAINING PROGRAM

## 2024-05-02 PROCEDURE — 82728 ASSAY OF FERRITIN: CPT | Performed by: STUDENT IN AN ORGANIZED HEALTH CARE EDUCATION/TRAINING PROGRAM

## 2024-05-02 PROCEDURE — 99214 OFFICE O/P EST MOD 30 MIN: CPT | Performed by: STUDENT IN AN ORGANIZED HEALTH CARE EDUCATION/TRAINING PROGRAM

## 2024-05-02 PROCEDURE — 83036 HEMOGLOBIN GLYCOSYLATED A1C: CPT | Performed by: STUDENT IN AN ORGANIZED HEALTH CARE EDUCATION/TRAINING PROGRAM

## 2024-05-02 PROCEDURE — 83540 ASSAY OF IRON: CPT | Performed by: STUDENT IN AN ORGANIZED HEALTH CARE EDUCATION/TRAINING PROGRAM

## 2024-05-02 ASSESSMENT — ANXIETY QUESTIONNAIRES
GAD7 TOTAL SCORE: 13
IF YOU CHECKED OFF ANY PROBLEMS ON THIS QUESTIONNAIRE, HOW DIFFICULT HAVE THESE PROBLEMS MADE IT FOR YOU TO DO YOUR WORK, TAKE CARE OF THINGS AT HOME, OR GET ALONG WITH OTHER PEOPLE: NOT DIFFICULT AT ALL
7. FEELING AFRAID AS IF SOMETHING AWFUL MIGHT HAPPEN: NOT AT ALL
2. NOT BEING ABLE TO STOP OR CONTROL WORRYING: SEVERAL DAYS
5. BEING SO RESTLESS THAT IT IS HARD TO SIT STILL: NEARLY EVERY DAY
7. FEELING AFRAID AS IF SOMETHING AWFUL MIGHT HAPPEN: NOT AT ALL
8. IF YOU CHECKED OFF ANY PROBLEMS, HOW DIFFICULT HAVE THESE MADE IT FOR YOU TO DO YOUR WORK, TAKE CARE OF THINGS AT HOME, OR GET ALONG WITH OTHER PEOPLE?: NOT DIFFICULT AT ALL
3. WORRYING TOO MUCH ABOUT DIFFERENT THINGS: SEVERAL DAYS
6. BECOMING EASILY ANNOYED OR IRRITABLE: MORE THAN HALF THE DAYS
1. FEELING NERVOUS, ANXIOUS, OR ON EDGE: NEARLY EVERY DAY
4. TROUBLE RELAXING: NEARLY EVERY DAY

## 2024-05-02 ASSESSMENT — PATIENT HEALTH QUESTIONNAIRE - PHQ9
SUM OF ALL RESPONSES TO PHQ QUESTIONS 1-9: 4
SUM OF ALL RESPONSES TO PHQ QUESTIONS 1-9: 4
10. IF YOU CHECKED OFF ANY PROBLEMS, HOW DIFFICULT HAVE THESE PROBLEMS MADE IT FOR YOU TO DO YOUR WORK, TAKE CARE OF THINGS AT HOME, OR GET ALONG WITH OTHER PEOPLE: NOT DIFFICULT AT ALL

## 2024-05-02 ASSESSMENT — PAIN SCALES - GENERAL: PAINLEVEL: NO PAIN (0)

## 2024-05-03 RX ORDER — FERROUS SULFATE 325(65) MG
325 TABLET ORAL
Qty: 30 TABLET | Refills: 1 | Status: SHIPPED | OUTPATIENT
Start: 2024-05-03

## 2024-05-23 DIAGNOSIS — F41.9 ANXIETY: ICD-10-CM

## 2024-05-23 RX ORDER — LORAZEPAM 0.5 MG/1
0.5 TABLET ORAL
Qty: 28 TABLET | Refills: 0 | Status: SHIPPED | OUTPATIENT
Start: 2024-05-23 | End: 2024-07-05

## 2024-05-23 NOTE — TELEPHONE ENCOUNTER
Ativan  Last Written Prescription Date: 3/26/24, discontinued  Last Fill Quantity: 28 # of Refills: 0  Last Office Visit: 5/2/24

## 2024-06-09 DIAGNOSIS — Z87.39 HX OF GOUT: ICD-10-CM

## 2024-06-09 DIAGNOSIS — I10 BENIGN ESSENTIAL HYPERTENSION: ICD-10-CM

## 2024-06-09 DIAGNOSIS — K21.00 GASTROESOPHAGEAL REFLUX DISEASE WITH ESOPHAGITIS WITHOUT HEMORRHAGE: ICD-10-CM

## 2024-06-10 RX ORDER — LISINOPRIL AND HYDROCHLOROTHIAZIDE 20; 25 MG/1; MG/1
1 TABLET ORAL DAILY
Qty: 90 TABLET | Refills: 3 | Status: SHIPPED | OUTPATIENT
Start: 2024-06-10

## 2024-06-10 RX ORDER — ALLOPURINOL 300 MG/1
1 TABLET ORAL DAILY
Qty: 90 TABLET | Refills: 3 | Status: SHIPPED | OUTPATIENT
Start: 2024-06-10

## 2024-06-10 RX ORDER — OMEPRAZOLE 40 MG/1
40 CAPSULE, DELAYED RELEASE ORAL DAILY
Qty: 90 CAPSULE | Refills: 3 | Status: SHIPPED | OUTPATIENT
Start: 2024-06-10

## 2024-07-05 ENCOUNTER — OFFICE VISIT (OUTPATIENT)
Dept: FAMILY MEDICINE | Facility: OTHER | Age: 64
End: 2024-07-05
Attending: STUDENT IN AN ORGANIZED HEALTH CARE EDUCATION/TRAINING PROGRAM
Payer: COMMERCIAL

## 2024-07-05 VITALS
OXYGEN SATURATION: 94 % | HEIGHT: 65 IN | RESPIRATION RATE: 22 BRPM | SYSTOLIC BLOOD PRESSURE: 110 MMHG | HEART RATE: 94 BPM | WEIGHT: 156.7 LBS | TEMPERATURE: 99 F | DIASTOLIC BLOOD PRESSURE: 60 MMHG | BODY MASS INDEX: 26.11 KG/M2

## 2024-07-05 DIAGNOSIS — F41.9 ANXIETY: ICD-10-CM

## 2024-07-05 DIAGNOSIS — R19.5 LOOSE STOOLS: ICD-10-CM

## 2024-07-05 DIAGNOSIS — R10.9 ABDOMINAL CRAMPING: Primary | ICD-10-CM

## 2024-07-05 LAB
BASOPHILS # BLD AUTO: 0 10E3/UL (ref 0–0.2)
BASOPHILS NFR BLD AUTO: 0 %
EOSINOPHIL # BLD AUTO: 0.1 10E3/UL (ref 0–0.7)
EOSINOPHIL NFR BLD AUTO: 2 %
ERYTHROCYTE [DISTWIDTH] IN BLOOD BY AUTOMATED COUNT: 14.3 % (ref 10–15)
HCT VFR BLD AUTO: 27.4 % (ref 35–47)
HGB BLD-MCNC: 9.4 G/DL (ref 11.7–15.7)
IMM GRANULOCYTES # BLD: 0 10E3/UL
IMM GRANULOCYTES NFR BLD: 0 %
LYMPHOCYTES # BLD AUTO: 2 10E3/UL (ref 0.8–5.3)
LYMPHOCYTES NFR BLD AUTO: 29 %
MCH RBC QN AUTO: 28 PG (ref 26.5–33)
MCHC RBC AUTO-ENTMCNC: 34.3 G/DL (ref 31.5–36.5)
MCV RBC AUTO: 82 FL (ref 78–100)
MONOCYTES # BLD AUTO: 0.6 10E3/UL (ref 0–1.3)
MONOCYTES NFR BLD AUTO: 9 %
NEUTROPHILS # BLD AUTO: 4.1 10E3/UL (ref 1.6–8.3)
NEUTROPHILS NFR BLD AUTO: 60 %
PLATELET # BLD AUTO: 385 10E3/UL (ref 150–450)
RBC # BLD AUTO: 3.36 10E6/UL (ref 3.8–5.2)
WBC # BLD AUTO: 6.9 10E3/UL (ref 4–11)

## 2024-07-05 PROCEDURE — 86140 C-REACTIVE PROTEIN: CPT | Performed by: STUDENT IN AN ORGANIZED HEALTH CARE EDUCATION/TRAINING PROGRAM

## 2024-07-05 PROCEDURE — 36415 COLL VENOUS BLD VENIPUNCTURE: CPT | Performed by: STUDENT IN AN ORGANIZED HEALTH CARE EDUCATION/TRAINING PROGRAM

## 2024-07-05 PROCEDURE — 99214 OFFICE O/P EST MOD 30 MIN: CPT | Performed by: STUDENT IN AN ORGANIZED HEALTH CARE EDUCATION/TRAINING PROGRAM

## 2024-07-05 PROCEDURE — 80053 COMPREHEN METABOLIC PANEL: CPT | Performed by: STUDENT IN AN ORGANIZED HEALTH CARE EDUCATION/TRAINING PROGRAM

## 2024-07-05 PROCEDURE — 83690 ASSAY OF LIPASE: CPT | Performed by: STUDENT IN AN ORGANIZED HEALTH CARE EDUCATION/TRAINING PROGRAM

## 2024-07-05 PROCEDURE — 85025 COMPLETE CBC W/AUTO DIFF WBC: CPT | Performed by: STUDENT IN AN ORGANIZED HEALTH CARE EDUCATION/TRAINING PROGRAM

## 2024-07-05 RX ORDER — LORAZEPAM 0.5 MG/1
0.5 TABLET ORAL
Qty: 28 TABLET | Refills: 0 | Status: SHIPPED | OUTPATIENT
Start: 2024-07-05 | End: 2024-09-17

## 2024-07-05 RX ORDER — LORAZEPAM 0.5 MG/1
0.5 TABLET ORAL
Qty: 28 TABLET | Refills: 0 | Status: CANCELLED | OUTPATIENT
Start: 2024-07-05

## 2024-07-05 ASSESSMENT — PATIENT HEALTH QUESTIONNAIRE - PHQ9
SUM OF ALL RESPONSES TO PHQ QUESTIONS 1-9: 5
SUM OF ALL RESPONSES TO PHQ QUESTIONS 1-9: 5
10. IF YOU CHECKED OFF ANY PROBLEMS, HOW DIFFICULT HAVE THESE PROBLEMS MADE IT FOR YOU TO DO YOUR WORK, TAKE CARE OF THINGS AT HOME, OR GET ALONG WITH OTHER PEOPLE: NOT DIFFICULT AT ALL

## 2024-07-05 ASSESSMENT — PAIN SCALES - GENERAL: PAINLEVEL: MODERATE PAIN (4)

## 2024-07-05 ASSESSMENT — ENCOUNTER SYMPTOMS
DIARRHEA: 1
ABDOMINAL PAIN: 1

## 2024-07-05 NOTE — PROGRESS NOTES
Assessment & Plan     Abdominal cramping  Loose stools  At least 2 weeks of intermittent abdominal discomfort with cramping, most prominent right upper quadrant, intermittent loose stools.  No fever or other infectious symptoms. Last colonoscopy 3/23/2023.  Discussed broad differential, history and exam a bit suspicious for biliary etiology.  May have been somewhat triggered by recent supplemental iron, considering acute on chronic exacerbation of constipation, bowel obstruction.  Fortunately symptoms stable for at least a couple weeks and declines abdominal imaging for now.  Will assess baseline labs, low threshold to send for abdominal imaging next week.  Reviewed S/S that warrant emergent evaluation of the weekend.  - CBC with platelets and differential  - Comprehensive metabolic panel (BMP + Alb, Alk Phos, ALT, AST, Total. Bili, TP)  - Lipase  - CRP, inflammation  - UA Macroscopic with reflex to Microscopic and Culture; Future  - Discontinue ferrous sulfate  - ED with worsening symptoms over the weekend  - Consider RUQ ultrasound versus CT pending results    Anxiety  Refilled.  No concerns.  - LORazepam (ATIVAN) 0.5 MG tablet; Take 1 tablet (0.5 mg) by mouth nightly as needed for anxiety    I spent a total of 31 minutes on the day of the visit.   Time spent by me doing chart review, history and exam, documentation and further activities per the note    Follow-up pending results    Martha Calzada is a 63 year old, presenting for the following health issues:  Abdominal Pain and Diarrhea        5/2/2024    11:12 AM   Additional Questions   Roomed by Yury Wellington LPN   Accompanied by Self     Abdominal Pain   Associated symptoms include diarrhea.   Diarrhea  Associated symptoms include abdominal pain.     ABDOMINAL PAIN   Onset: x almost 2 weeks  Description:   Character: Sharp, stabbing when goes to the bathroom sometimes  Location: right upper quadrant  Radiation: Back  Intensity: severe  Progression of  Symptoms:  intermittent  Accompanying Signs & Symptoms:  Fever/Chills?: YES  Gas/Bloating: no   Nausea: YES  Vomitting: no but feels like she has to sometimes  Diarrhea?: YES- last week, had accident at work  Constipation:YES- hasn't gone since Sunday  Dysuria or Hematuria: no  History:   Trauma: no   Previous similar pain: no    Previous tests done: none  Precipitating factors:   Does the pain change with:     Food: YES     BM: YES    Urination: no   Therapies Tried and outcome: changes in eating habits, scared to set it off so has been scared to eat or drink anything.        -At least 2 weeks of intermittent cramping pain, somewhat generalized but overall worse in the right upper quadrant  -Quite severe at times  -Mildly nauseous at times, no emesis  -No obvious triggers or associations although sometimes feels like eating extra makes it worse  -Associated with intermittent loose stools  -No blood/melena/hematochezia  -No fevers, chills, flulike symptoms  -No recent change in diet, other sick exposures  -No prior abdominal surgeries  -Does feel dehydrated at times  -Does feel like a bit lightheaded after diarrhea sometimes    -Was started on supplemental iron about 6 weeks ago  -Took iron pills for about 3 weeks  -Seem to make her constipated and after the heartburn  -Self discontinued the iron pills about 3 weeks ago    -Pretty significant constipation at baseline  -Not unusual vertigo a week between bowel movements  -Currently her last bowel movement was 5 days ago  -Does not take any bowel regimen    Medication Followup of ferrous sulfate  Taking Medication as prescribed: NO  Side Effects:  yes, diarrhea with hemorrhoids   Medication Helping Symptoms:  not applicable    Review of Systems  Constitutional, HEENT, cardiovascular, pulmonary, gi and gu systems are negative, except as otherwise noted.      Objective    /60 (BP Location: Left arm, Patient Position: Sitting, Cuff Size: Adult Regular)   Pulse 94   " Temp 99  F (37.2  C) (Tympanic)   Resp 22   Ht 1.651 m (5' 5\")   Wt 71.1 kg (156 lb 11.2 oz)   SpO2 94%   BMI 26.08 kg/m    Body mass index is 26.08 kg/m .  Physical Exam  Vitals reviewed.   Constitutional:       Appearance: Normal appearance.   HENT:      Head: Normocephalic and atraumatic.   Cardiovascular:      Rate and Rhythm: Normal rate and regular rhythm.      Heart sounds: Normal heart sounds.   Pulmonary:      Effort: Pulmonary effort is normal.      Breath sounds: Normal breath sounds.   Abdominal:      General: Abdomen is flat. Bowel sounds are normal. There is no distension.      Palpations: Abdomen is soft.      Tenderness: There is abdominal tenderness. There is no right CVA tenderness, left CVA tenderness or guarding.      Comments: Mild tenderness throughout, most notable right upper quadrant.   Musculoskeletal:         General: Normal range of motion.   Skin:     General: Skin is warm and dry.   Neurological:      General: No focal deficit present.      Mental Status: She is alert and oriented to person, place, and time.   Psychiatric:         Mood and Affect: Mood normal.         Behavior: Behavior normal.        Signed Electronically by: Donaldo Seymour MD    "

## 2024-07-07 ENCOUNTER — HEALTH MAINTENANCE LETTER (OUTPATIENT)
Age: 64
End: 2024-07-07

## 2024-07-07 PROCEDURE — 87086 URINE CULTURE/COLONY COUNT: CPT

## 2024-07-07 PROCEDURE — 87186 SC STD MICRODIL/AGAR DIL: CPT

## 2024-07-07 PROCEDURE — 82043 UR ALBUMIN QUANTITATIVE: CPT

## 2024-07-07 PROCEDURE — 81001 URINALYSIS AUTO W/SCOPE: CPT

## 2024-07-07 PROCEDURE — 82570 ASSAY OF URINE CREATININE: CPT

## 2024-07-08 ENCOUNTER — LAB (OUTPATIENT)
Dept: LAB | Facility: OTHER | Age: 64
End: 2024-07-08
Payer: COMMERCIAL

## 2024-07-08 DIAGNOSIS — R10.9 ABDOMINAL CRAMPING: ICD-10-CM

## 2024-07-08 DIAGNOSIS — E11.9 TYPE 2 DIABETES MELLITUS WITHOUT COMPLICATION, WITHOUT LONG-TERM CURRENT USE OF INSULIN (H): ICD-10-CM

## 2024-07-08 DIAGNOSIS — N30.00 ACUTE CYSTITIS WITHOUT HEMATURIA: Primary | ICD-10-CM

## 2024-07-08 LAB
ALBUMIN SERPL BCG-MCNC: 4.2 G/DL (ref 3.5–5.2)
ALBUMIN UR-MCNC: NEGATIVE MG/DL
ALP SERPL-CCNC: 82 U/L (ref 40–150)
ALT SERPL W P-5'-P-CCNC: 24 U/L (ref 0–50)
ANION GAP SERPL CALCULATED.3IONS-SCNC: 16 MMOL/L (ref 7–15)
APPEARANCE UR: ABNORMAL
AST SERPL W P-5'-P-CCNC: 35 U/L (ref 0–45)
BACTERIA #/AREA URNS HPF: ABNORMAL /HPF
BILIRUB SERPL-MCNC: 0.2 MG/DL
BILIRUB UR QL STRIP: NEGATIVE
BUN SERPL-MCNC: 33.4 MG/DL (ref 8–23)
CALCIUM SERPL-MCNC: 9.6 MG/DL (ref 8.8–10.2)
CHLORIDE SERPL-SCNC: 99 MMOL/L (ref 98–107)
COLOR UR AUTO: YELLOW
CREAT SERPL-MCNC: 1.4 MG/DL (ref 0.51–0.95)
CREAT UR-MCNC: 28.2 MG/DL
CRP SERPL-MCNC: 4.5 MG/L
DEPRECATED HCO3 PLAS-SCNC: 20 MMOL/L (ref 22–29)
EGFRCR SERPLBLD CKD-EPI 2021: 42 ML/MIN/1.73M2
GLUCOSE SERPL-MCNC: 98 MG/DL (ref 70–99)
GLUCOSE UR STRIP-MCNC: NEGATIVE MG/DL
HGB UR QL STRIP: NEGATIVE
KETONES UR STRIP-MCNC: NEGATIVE MG/DL
LEUKOCYTE ESTERASE UR QL STRIP: ABNORMAL
LIPASE SERPL-CCNC: 22 U/L (ref 13–60)
MICROALBUMIN UR-MCNC: 18.7 MG/L
MICROALBUMIN/CREAT UR: 66.31 MG/G CR (ref 0–25)
NITRATE UR QL: POSITIVE
PH UR STRIP: 7 [PH] (ref 5–7)
POTASSIUM SERPL-SCNC: 4 MMOL/L (ref 3.4–5.3)
PROT SERPL-MCNC: 6.8 G/DL (ref 6.4–8.3)
RBC #/AREA URNS AUTO: ABNORMAL /HPF
SODIUM SERPL-SCNC: 135 MMOL/L (ref 135–145)
SP GR UR STRIP: <=1.005 (ref 1–1.03)
UROBILINOGEN UR STRIP-ACNC: 0.2 E.U./DL
WBC #/AREA URNS AUTO: ABNORMAL /HPF

## 2024-07-08 RX ORDER — CEPHALEXIN 500 MG/1
500 CAPSULE ORAL 2 TIMES DAILY
Qty: 14 CAPSULE | Refills: 0 | Status: SHIPPED | OUTPATIENT
Start: 2024-07-08 | End: 2024-07-15

## 2024-07-10 LAB — BACTERIA UR CULT: ABNORMAL

## 2024-07-11 ENCOUNTER — TELEPHONE (OUTPATIENT)
Dept: FAMILY MEDICINE | Facility: OTHER | Age: 64
End: 2024-07-11

## 2024-07-11 NOTE — TELEPHONE ENCOUNTER
8:59 AM    Reason for Call: OVERBOOK    Patient is having the following symptoms: stomach issues/constipation - has not been able to use the bathroom for 13 days.    The patient is requesting an appointment for today with Lion payne .    Was an appointment offered for this call? No - pt would like to be seen today.  If yes : Appointment type              Date    Preferred method for responding to this message: Telephone Call  What is your phone number? [phone]    If we cannot reach you directly, may we leave a detailed response at the number you provided? Yes    Can this message wait until your PCP/provider returns, if unavailable today? Not applicable    Madelaine Casper

## 2024-07-12 ENCOUNTER — OFFICE VISIT (OUTPATIENT)
Dept: FAMILY MEDICINE | Facility: OTHER | Age: 64
End: 2024-07-12
Attending: STUDENT IN AN ORGANIZED HEALTH CARE EDUCATION/TRAINING PROGRAM
Payer: COMMERCIAL

## 2024-07-12 ENCOUNTER — HOSPITAL ENCOUNTER (OUTPATIENT)
Dept: CT IMAGING | Facility: HOSPITAL | Age: 64
Discharge: HOME | End: 2024-07-12
Attending: STUDENT IN AN ORGANIZED HEALTH CARE EDUCATION/TRAINING PROGRAM
Payer: COMMERCIAL

## 2024-07-12 VITALS
RESPIRATION RATE: 16 BRPM | OXYGEN SATURATION: 98 % | WEIGHT: 157.3 LBS | DIASTOLIC BLOOD PRESSURE: 76 MMHG | HEART RATE: 80 BPM | BODY MASS INDEX: 26.21 KG/M2 | TEMPERATURE: 98 F | HEIGHT: 65 IN | SYSTOLIC BLOOD PRESSURE: 120 MMHG

## 2024-07-12 DIAGNOSIS — M25.512 CHRONIC PAIN OF BOTH SHOULDERS: ICD-10-CM

## 2024-07-12 DIAGNOSIS — G89.29 CHRONIC PAIN OF BOTH SHOULDERS: ICD-10-CM

## 2024-07-12 DIAGNOSIS — M25.511 CHRONIC PAIN OF BOTH SHOULDERS: ICD-10-CM

## 2024-07-12 DIAGNOSIS — R10.9 ABDOMINAL CRAMPING: Primary | ICD-10-CM

## 2024-07-12 LAB
ALBUMIN SERPL BCG-MCNC: 4.4 G/DL (ref 3.5–5.2)
ALP SERPL-CCNC: 87 U/L (ref 40–150)
ALT SERPL W P-5'-P-CCNC: 20 U/L (ref 0–50)
ANION GAP SERPL CALCULATED.3IONS-SCNC: 12 MMOL/L (ref 7–15)
AST SERPL W P-5'-P-CCNC: 26 U/L (ref 0–45)
BASOPHILS # BLD AUTO: 0 10E3/UL (ref 0–0.2)
BASOPHILS NFR BLD AUTO: 1 %
BILIRUB SERPL-MCNC: 0.3 MG/DL
BUN SERPL-MCNC: 28.8 MG/DL (ref 8–23)
CALCIUM SERPL-MCNC: 9.8 MG/DL (ref 8.8–10.2)
CHLORIDE SERPL-SCNC: 102 MMOL/L (ref 98–107)
CREAT SERPL-MCNC: 1.24 MG/DL (ref 0.51–0.95)
CRP SERPL-MCNC: <3 MG/L
DEPRECATED HCO3 PLAS-SCNC: 23 MMOL/L (ref 22–29)
EGFRCR SERPLBLD CKD-EPI 2021: 49 ML/MIN/1.73M2
EOSINOPHIL # BLD AUTO: 0.2 10E3/UL (ref 0–0.7)
EOSINOPHIL NFR BLD AUTO: 3 %
ERYTHROCYTE [DISTWIDTH] IN BLOOD BY AUTOMATED COUNT: 14.6 % (ref 10–15)
GLUCOSE SERPL-MCNC: 125 MG/DL (ref 70–99)
HCT VFR BLD AUTO: 28.6 % (ref 35–47)
HGB BLD-MCNC: 9.3 G/DL (ref 11.7–15.7)
IMM GRANULOCYTES # BLD: 0 10E3/UL
IMM GRANULOCYTES NFR BLD: 0 %
LIPASE SERPL-CCNC: 34 U/L (ref 13–60)
LYMPHOCYTES # BLD AUTO: 1.9 10E3/UL (ref 0.8–5.3)
LYMPHOCYTES NFR BLD AUTO: 25 %
MCH RBC QN AUTO: 27 PG (ref 26.5–33)
MCHC RBC AUTO-ENTMCNC: 32.5 G/DL (ref 31.5–36.5)
MCV RBC AUTO: 83 FL (ref 78–100)
MONOCYTES # BLD AUTO: 0.6 10E3/UL (ref 0–1.3)
MONOCYTES NFR BLD AUTO: 8 %
NEUTROPHILS # BLD AUTO: 4.6 10E3/UL (ref 1.6–8.3)
NEUTROPHILS NFR BLD AUTO: 63 %
NRBC # BLD AUTO: 0 10E3/UL
NRBC BLD AUTO-RTO: 0 /100
PLATELET # BLD AUTO: 403 10E3/UL (ref 150–450)
POTASSIUM SERPL-SCNC: 4.1 MMOL/L (ref 3.4–5.3)
PROT SERPL-MCNC: 7 G/DL (ref 6.4–8.3)
RBC # BLD AUTO: 3.45 10E6/UL (ref 3.8–5.2)
SODIUM SERPL-SCNC: 137 MMOL/L (ref 135–145)
WBC # BLD AUTO: 7.4 10E3/UL (ref 4–11)

## 2024-07-12 PROCEDURE — 99417 PROLNG OP E/M EACH 15 MIN: CPT | Performed by: STUDENT IN AN ORGANIZED HEALTH CARE EDUCATION/TRAINING PROGRAM

## 2024-07-12 PROCEDURE — 85025 COMPLETE CBC W/AUTO DIFF WBC: CPT | Performed by: STUDENT IN AN ORGANIZED HEALTH CARE EDUCATION/TRAINING PROGRAM

## 2024-07-12 PROCEDURE — 74177 CT ABD & PELVIS W/CONTRAST: CPT

## 2024-07-12 PROCEDURE — 83690 ASSAY OF LIPASE: CPT | Performed by: STUDENT IN AN ORGANIZED HEALTH CARE EDUCATION/TRAINING PROGRAM

## 2024-07-12 PROCEDURE — 99215 OFFICE O/P EST HI 40 MIN: CPT | Performed by: STUDENT IN AN ORGANIZED HEALTH CARE EDUCATION/TRAINING PROGRAM

## 2024-07-12 PROCEDURE — 36415 COLL VENOUS BLD VENIPUNCTURE: CPT | Performed by: STUDENT IN AN ORGANIZED HEALTH CARE EDUCATION/TRAINING PROGRAM

## 2024-07-12 PROCEDURE — 80053 COMPREHEN METABOLIC PANEL: CPT | Performed by: STUDENT IN AN ORGANIZED HEALTH CARE EDUCATION/TRAINING PROGRAM

## 2024-07-12 PROCEDURE — 86140 C-REACTIVE PROTEIN: CPT | Performed by: STUDENT IN AN ORGANIZED HEALTH CARE EDUCATION/TRAINING PROGRAM

## 2024-07-12 PROCEDURE — 250N000011 HC RX IP 250 OP 636: Performed by: RADIOLOGY

## 2024-07-12 RX ORDER — CYCLOBENZAPRINE HCL 10 MG
10 TABLET ORAL 3 TIMES DAILY PRN
Qty: 90 TABLET | Refills: 0 | Status: SHIPPED | OUTPATIENT
Start: 2024-07-12 | End: 2024-07-24

## 2024-07-12 RX ORDER — IOPAMIDOL 755 MG/ML
77 INJECTION, SOLUTION INTRAVASCULAR ONCE
Status: COMPLETED | OUTPATIENT
Start: 2024-07-12 | End: 2024-07-12

## 2024-07-12 RX ADMIN — IOPAMIDOL 77 ML: 755 INJECTION, SOLUTION INTRAVENOUS at 09:44

## 2024-07-12 ASSESSMENT — PAIN SCALES - GENERAL: PAINLEVEL: MODERATE PAIN (5)

## 2024-07-12 NOTE — PROGRESS NOTES
Assessment & Plan     Abdominal cramping  Now about 3 weeks of ongoing abdominal cramping, tenderness, and more recently 1 week of constipation.  Recently treated for UTI.  Differential included suspicion for possible SBO, pyelonephritis, hepatobiliary pathology, among more broad differential including colitis, pancreatitis.  Updated labs today which were all stable/reassuring, also sent for stat CT; reviewed this with patient and it was unremarkable.  At this point seems like majority of her symptoms could be related to constipation and she is much more comfortable now being a bit more aggressive with bowel regimen over the weekend.  No significant infectious symptoms but discussed monitoring parameters that require urgent evaluation.  - UA Macroscopic with reflex to Microscopic and Culture; Future  - Lipase  - CBC with platelets and differential  - Comprehensive metabolic panel (BMP + Alb, Alk Phos, ALT, AST, Total. Bili, TP)  - CRP, inflammation  - CT Abdomen Pelvis w Contrast; Future  - Short and will follow-up if symptoms ongoing    Chronic pain of both shoulders  Refilled.  - cyclobenzaprine (FLEXERIL) 10 MG tablet; Take 1 tablet (10 mg) by mouth 3 times daily as needed for muscle spasms    I spent a total of 58 minutes on the day of the visit.   Time spent by me doing chart review, history and exam, documentation and further activities per the note    Subjective   Elsi is a 63 year old, presenting for the following health issues:  Constipation        7/12/2024     8:12 AM   Additional Questions   Roomed by Yury Wellington LPN   Accompanied by Self         7/12/2024     8:12 AM   Patient Reported Additional Medications   Patient reports taking the following new medications Keflex     HPI     Constipation  Onset/Duration: Has not had a bowel movement since July 1st.  Description:  Frequency of bowel movements: Every couple days   Consistency of stool: Loose previous to constipation issue  Progression of  "Symptoms: worsening  Accompanying signs and symptoms:    Abdominal pain: YES, Right side   Rectal pain: No   Blood in stool: No   Nausea/Vomiting: YES- nauseated   Weight loss or gain: YES- slight  History:   Similar problems in past: YES  History of abdominal surgery: No  Chronic laxative use: No  New medications: YES- Keflex for UTI  Precipitating or alleviating factors: None  Therapies tried and outcome: Miralax since Saturday, has not helped at this time.     -Seen 1 week ago for 2-week history of abdominal cramping, at that time loose stools  -Full workup, only remarkable for UTI  -Treated with course of Keflex  -Overall minimal change in symptoms over the past week  -If anything symptoms are worse because now she is more constipated  -Has not had a bowel movement in over a week  -She is been afraid to use bowel regimen, MiraLAX added concerns for some other underlying abdominal pathology  -Decreased appetite, mild nausea but no emesis  -No melena or hematochezia  -Still having overall abdominal cramping, probably worse right sided  -Never had much for dysuria, this has not changed after the Keflex  -Does not necessarily feel bloated, distended or constipated but knows that she has not a bowel movement a long time  -No fevers, chills, flulike symptoms  -Works very long hours, maybe a little bit dehydrated  -No specific triggers or exacerbating features    Review of Systems  Constitutional, HEENT, cardiovascular, pulmonary, gi and gu systems are negative, except as otherwise noted.      Objective    /76   Pulse 80   Temp 98  F (36.7  C) (Tympanic)   Resp 16   Ht 1.651 m (5' 5\")   Wt 71.4 kg (157 lb 4.8 oz)   SpO2 98%   BMI 26.18 kg/m    Body mass index is 26.18 kg/m .  Physical Exam   GENERAL: alert and no distress  NECK: no adenopathy, no asymmetry, masses, or scars  RESP: lungs clear to auscultation - no rales, rhonchi or wheezes  CV: regular rate and rhythm, normal S1 S2, no S3 or S4, no murmur, " click or rub, no peripheral edema  ABDOMEN: Soft, nondistended, mild tenderness to deep palpation throughout but most noticeable right side  MS: no gross musculoskeletal defects noted, no edema  SKIN: no suspicious lesions or rashes  NEURO: Normal strength and tone, mentation intact and speech normal  PSYCH: mentation appears normal, affect normal/bright    Results for orders placed or performed in visit on 07/12/24 (from the past 24 hour(s))   Lipase   Result Value Ref Range    Lipase 34 13 - 60 U/L   CBC with platelets and differential    Narrative    The following orders were created for panel order CBC with platelets and differential.  Procedure                               Abnormality         Status                     ---------                               -----------         ------                     CBC with platelets and d...[[id number]]  Abnormal            Final result                 Please view results for these tests on the individual orders.   Comprehensive metabolic panel (BMP + Alb, Alk Phos, ALT, AST, Total. Bili, TP)   Result Value Ref Range    Sodium 137 135 - 145 mmol/L    Potassium 4.1 3.4 - 5.3 mmol/L    Carbon Dioxide (CO2) 23 22 - 29 mmol/L    Anion Gap 12 7 - 15 mmol/L    Urea Nitrogen 28.8 (H) 8.0 - 23.0 mg/dL    Creatinine 1.24 (H) 0.51 - 0.95 mg/dL    GFR Estimate 49 (L) >60 mL/min/1.73m2    Calcium 9.8 8.8 - 10.2 mg/dL    Chloride 102 98 - 107 mmol/L    Glucose 125 (H) 70 - 99 mg/dL    Alkaline Phosphatase 87 40 - 150 U/L    AST 26 0 - 45 U/L    ALT 20 0 - 50 U/L    Protein Total 7.0 6.4 - 8.3 g/dL    Albumin 4.4 3.5 - 5.2 g/dL    Bilirubin Total 0.3 <=1.2 mg/dL   CRP, inflammation   Result Value Ref Range    CRP Inflammation <3.00 <5.00 mg/L   CBC with platelets and differential   Result Value Ref Range    WBC Count 7.4 4.0 - 11.0 10e3/uL    RBC Count 3.45 (L) 3.80 - 5.20 10e6/uL    Hemoglobin 9.3 (L) 11.7 - 15.7 g/dL    Hematocrit 28.6 (L) 35.0 - 47.0 %    MCV 83 78 - 100 fL    MCH  27.0 26.5 - 33.0 pg    MCHC 32.5 31.5 - 36.5 g/dL    RDW 14.6 10.0 - 15.0 %    Platelet Count 403 150 - 450 10e3/uL    % Neutrophils 63 %    % Lymphocytes 25 %    % Monocytes 8 %    % Eosinophils 3 %    % Basophils 1 %    % Immature Granulocytes 0 %    NRBCs per 100 WBC 0 <1 /100    Absolute Neutrophils 4.6 1.6 - 8.3 10e3/uL    Absolute Lymphocytes 1.9 0.8 - 5.3 10e3/uL    Absolute Monocytes 0.6 0.0 - 1.3 10e3/uL    Absolute Eosinophils 0.2 0.0 - 0.7 10e3/uL    Absolute Basophils 0.0 0.0 - 0.2 10e3/uL    Absolute Immature Granulocytes 0.0 <=0.4 10e3/uL    Absolute NRBCs 0.0 10e3/uL     CT Abdomen Pelvis w Contrast    Result Date: 7/12/2024  EXAMINATION: CT ABDOMEN PELVIS W CONTRAST, 7/12/2024 9:50 AM TECHNIQUE:  Helical CT images from the lung bases through the symphysis pubis were obtained  with IV contrast. Contrast dose: ISOVUE 370  77mL COMPARISON: none HISTORY: Persistent abdominal pain, mostly right-sided.  Assess for bowel obstruction, lower on the differential pyelonephritis, versus other; Chronic pain of both shoulders; Chronic pain of both shoulders; Chronic pain of both shoulders FINDINGS: There is dependent atelectasis at the lung bases. Multiple low-density lesions are seen in the liver most likely cysts. These are stable from 2009. There is no biliary ductal enlargement. No calcified gallstones are seen. The the spleen and pancreas appear normal. The adrenal glands are normal. There is a benign-appearing cyst in the upper pole of the left kidney. There is no hydronephrosis. The periaortic lymph nodes are normal in caliber. No intraperitoneal masses or inflammatory changes are noted. The appendix is normal. In the pelvis the bladder and rectum appear normal. Degenerative changes are present in the thoracic and lumbar spine     IMPRESSION: No intraperitoneal masses or inflammatory changes. ANAIS WHITLOCK MD   SYSTEM ID:  [id number]         Signed Electronically by: Donaldo Seymour MD

## 2024-07-23 DIAGNOSIS — M25.511 CHRONIC PAIN OF BOTH SHOULDERS: ICD-10-CM

## 2024-07-23 DIAGNOSIS — G89.29 CHRONIC PAIN OF BOTH SHOULDERS: ICD-10-CM

## 2024-07-23 DIAGNOSIS — M25.512 CHRONIC PAIN OF BOTH SHOULDERS: ICD-10-CM

## 2024-07-24 ENCOUNTER — MYC REFILL (OUTPATIENT)
Dept: FAMILY MEDICINE | Facility: OTHER | Age: 64
End: 2024-07-24

## 2024-07-24 DIAGNOSIS — G89.29 CHRONIC PAIN OF BOTH SHOULDERS: ICD-10-CM

## 2024-07-24 DIAGNOSIS — M25.512 CHRONIC PAIN OF BOTH SHOULDERS: ICD-10-CM

## 2024-07-24 DIAGNOSIS — M25.511 CHRONIC PAIN OF BOTH SHOULDERS: ICD-10-CM

## 2024-07-24 DIAGNOSIS — R10.9 ABDOMINAL CRAMPING: ICD-10-CM

## 2024-07-24 RX ORDER — MELOXICAM 15 MG/1
15 TABLET ORAL DAILY
Qty: 90 TABLET | Refills: 0 | Status: SHIPPED | OUTPATIENT
Start: 2024-07-24

## 2024-07-24 RX ORDER — CYCLOBENZAPRINE HCL 10 MG
10 TABLET ORAL 3 TIMES DAILY PRN
Qty: 90 TABLET | Refills: 0 | Status: SHIPPED | OUTPATIENT
Start: 2024-07-24

## 2024-07-24 NOTE — TELEPHONE ENCOUNTER
Flexeril 10 mg       Last Written Prescription Date:  7/12/24  Last Fill Quantity: 90,   # refills: 0  Last Office Visit: 7/12/24  Future Office visit:    Next 5 appointments (look out 90 days)      Aug 23, 2024 9:00 AM  (Arrive by 8:45 AM)  Adult Preventative Visit with Donaldo Seymour MD  [facility] [place] ([facility] [place] ) [address]  [place] [address]  [phone]             Routing refill request to provider for review/approval because:  Drug not on the [facility], [facility] or [facility] refill protocol or controlled substance

## 2024-07-24 NOTE — TELEPHONE ENCOUNTER
GFR Estimate   Date Value Ref Range Status   07/12/2024 49 (L) >60 mL/min/1.73m2 Final     Comment:     eGFR calculated using 2021 CKD-EPI equation.   07/05/2024 42 (L) >60 mL/min/1.73m2 Final     Comment:     eGFR calculated using 2021 CKD-EPI equation.   05/02/2024 53 (L) >60 mL/min/1.73m2 Final   04/21/2015 88 >60 mL/min/1.7m2 Final     Comment:     Non  GFR Calc   04/20/2015 85 >60 mL/min/1.7m2 Final     Comment:     Non  GFR Calc     GFR Estimate If Black   Date Value Ref Range Status   04/21/2015 >90   GFR Calc   >60 mL/min/1.7m2 Final   04/20/2015 >90   GFR Calc   >60 mL/min/1.7m2 Final      Lab Results   Component Value Date    WBC 7.4 07/12/2024    WBC 5.8 01/27/2020     Lab Results   Component Value Date    RBC 3.45 07/12/2024    RBC 4.23 01/27/2020     Lab Results   Component Value Date    HGB 9.3 07/12/2024    HGB 12.1 01/27/2020     Lab Results   Component Value Date    HCT 28.6 07/12/2024    HCT 35.1 01/27/2020     Lab Results   Component Value Date    MCV 83 07/12/2024    MCV 83 01/27/2020     Lab Results   Component Value Date    MCH 27.0 07/12/2024    MCH 28.6 01/27/2020     Lab Results   Component Value Date    MCHC 32.5 07/12/2024    MCHC 34.5 01/27/2020     Lab Results   Component Value Date    RDW 14.6 07/12/2024    RDW 12.4 01/27/2020     Lab Results   Component Value Date     07/12/2024     01/27/2020    meloxicam (MOBIC) 15 MG tablet       Last Written Prescription Date:  42/22/24  Last Fill Quantity: 90,   # refills: 0  Last Office Visit: 7/5/24  Future Office visit:    Next 5 appointments (look out 90 days)      Aug 23, 2024 9:00 AM  (Arrive by 8:45 AM)  Adult Preventative Visit with Donaldo Seymour MD  [facility] [address] [address]  [phone]             Routing refill request to provider for review/approval because:

## 2024-09-09 ENCOUNTER — TELEPHONE (OUTPATIENT)
Dept: FAMILY MEDICINE | Facility: OTHER | Age: 64
End: 2024-09-09

## 2024-09-09 NOTE — TELEPHONE ENCOUNTER
9:06 AM    Reason for Call: OVERBOOK    Patient is having the following symptoms: Lightheaded / wondering if it is low iron  for 4-5 days.    The patient is requesting an appointment for Tuesday or Wednesday  with Bernice Valdovinos.    Was an appointment offered for this call? No  If yes : Appointment type              Date    Preferred method for responding to this message: Telephone Call  What is your phone number ?  [phone]     If we cannot reach you directly, may we leave a detailed response at the number you provided? Yes    Can this message wait until your PCP/provider returns, if unavailable today? Not applicable    Diana Camp

## 2024-09-11 ENCOUNTER — OFFICE VISIT (OUTPATIENT)
Dept: FAMILY MEDICINE | Facility: OTHER | Age: 64
End: 2024-09-11
Attending: NURSE PRACTITIONER
Payer: COMMERCIAL

## 2024-09-11 VITALS
RESPIRATION RATE: 18 BRPM | HEIGHT: 65 IN | HEART RATE: 96 BPM | WEIGHT: 155.7 LBS | TEMPERATURE: 99.8 F | DIASTOLIC BLOOD PRESSURE: 60 MMHG | BODY MASS INDEX: 25.94 KG/M2 | OXYGEN SATURATION: 97 % | SYSTOLIC BLOOD PRESSURE: 126 MMHG

## 2024-09-11 DIAGNOSIS — R42 DIZZINESS: Primary | ICD-10-CM

## 2024-09-11 DIAGNOSIS — Z12.31 ENCOUNTER FOR SCREENING MAMMOGRAM FOR BREAST CANCER: ICD-10-CM

## 2024-09-11 DIAGNOSIS — Z86.2 HISTORY OF ANEMIA: ICD-10-CM

## 2024-09-11 DIAGNOSIS — R53.83 OTHER FATIGUE: ICD-10-CM

## 2024-09-11 PROCEDURE — 85060 BLOOD SMEAR INTERPRETATION: CPT | Performed by: PATHOLOGY

## 2024-09-11 PROCEDURE — 85045 AUTOMATED RETICULOCYTE COUNT: CPT | Performed by: NURSE PRACTITIONER

## 2024-09-11 PROCEDURE — 82746 ASSAY OF FOLIC ACID SERUM: CPT | Performed by: NURSE PRACTITIONER

## 2024-09-11 PROCEDURE — 36415 COLL VENOUS BLD VENIPUNCTURE: CPT | Performed by: NURSE PRACTITIONER

## 2024-09-11 PROCEDURE — 86140 C-REACTIVE PROTEIN: CPT | Performed by: NURSE PRACTITIONER

## 2024-09-11 PROCEDURE — 83735 ASSAY OF MAGNESIUM: CPT | Performed by: NURSE PRACTITIONER

## 2024-09-11 PROCEDURE — 82607 VITAMIN B-12: CPT | Performed by: NURSE PRACTITIONER

## 2024-09-11 PROCEDURE — 82728 ASSAY OF FERRITIN: CPT | Performed by: NURSE PRACTITIONER

## 2024-09-11 PROCEDURE — 99214 OFFICE O/P EST MOD 30 MIN: CPT | Performed by: NURSE PRACTITIONER

## 2024-09-11 PROCEDURE — 80050 GENERAL HEALTH PANEL: CPT | Performed by: NURSE PRACTITIONER

## 2024-09-11 PROCEDURE — 83540 ASSAY OF IRON: CPT | Performed by: NURSE PRACTITIONER

## 2024-09-11 PROCEDURE — 83550 IRON BINDING TEST: CPT | Performed by: NURSE PRACTITIONER

## 2024-09-11 PROCEDURE — G2211 COMPLEX E/M VISIT ADD ON: HCPCS | Performed by: NURSE PRACTITIONER

## 2024-09-11 PROCEDURE — 93000 ELECTROCARDIOGRAM COMPLETE: CPT | Performed by: INTERNAL MEDICINE

## 2024-09-11 ASSESSMENT — ANXIETY QUESTIONNAIRES
GAD7 TOTAL SCORE: 7
8. IF YOU CHECKED OFF ANY PROBLEMS, HOW DIFFICULT HAVE THESE MADE IT FOR YOU TO DO YOUR WORK, TAKE CARE OF THINGS AT HOME, OR GET ALONG WITH OTHER PEOPLE?: SOMEWHAT DIFFICULT
7. FEELING AFRAID AS IF SOMETHING AWFUL MIGHT HAPPEN: NOT AT ALL
GAD7 TOTAL SCORE: 7
GAD7 TOTAL SCORE: 7

## 2024-09-11 ASSESSMENT — PAIN SCALES - GENERAL: PAINLEVEL: SEVERE PAIN (6)

## 2024-09-11 ASSESSMENT — PATIENT HEALTH QUESTIONNAIRE - PHQ9
10. IF YOU CHECKED OFF ANY PROBLEMS, HOW DIFFICULT HAVE THESE PROBLEMS MADE IT FOR YOU TO DO YOUR WORK, TAKE CARE OF THINGS AT HOME, OR GET ALONG WITH OTHER PEOPLE: SOMEWHAT DIFFICULT
SUM OF ALL RESPONSES TO PHQ QUESTIONS 1-9: 10
SUM OF ALL RESPONSES TO PHQ QUESTIONS 1-9: 10

## 2024-09-11 NOTE — PROGRESS NOTES
"  Assessment & Plan       (R42) Dizziness  (primary encounter diagnosis)  Comment: check labs, UA, and EKG   Plan: EKG 12-lead complete w/read - (Clinic         Performed), CBC with platelets, Comprehensive         metabolic panel, TSH with free T4 reflex,         Magnesium, CRP, inflammation, Iron and iron         binding capacity, Ferritin, Vitamin B12,         Folate, UA Macroscopic with reflex to         Microscopic and Culture            (R53.83) Other fatigue  Comment: check labs, UA, and EKG   Plan: EKG 12-lead complete w/read - (Clinic         Performed), CBC with platelets, Comprehensive         metabolic panel, TSH with free T4 reflex,         Magnesium, CRP, inflammation, Iron and iron         binding capacity, Ferritin, Vitamin B12,         Folate, UA Macroscopic with reflex to         Microscopic and Culture, Lab Blood Morphology         Pathologist Review            (Z12.31) Encounter for screening mammogram for breast cancer  Comment: Due for mammogram. Mammogram ordered   Plan: MA Screen Bilateral w/Nacho            (Z86.2) History of anemia  Comment: Peripheral smear ordered   Plan: Lab Blood Morphology Pathologist Review          The longitudinal plan of care for the diagnosis(es)/condition(s) as documented were addressed during this visit. Due to the added complexity in care, I will continue to support Elsi in the subsequent management and with ongoing continuity of care.    BMI  Estimated body mass index is 25.91 kg/m  as calculated from the following:    Height as of this encounter: 1.651 m (5' 5\").    Weight as of this encounter: 70.6 kg (155 lb 11.2 oz).         See Patient Instructions    Return if symptoms worsen or fail to improve.    Subjective   Elsi is a 64 year old, presenting for the following health issues:  Dizziness        9/11/2024     3:59 PM   Additional Questions   Roomed by Megan LOPEZ     History of Present Illness       Reason for visit:  Feeling very tired legs ache sometimes " "i get very light headed   She is taking medications regularly.       Dizziness  Onset/Duration: x couple of months   Description:   Do you feel faint: YES  Does it feel like the surroundings (bed, room) are moving: No  Unsteady/off balance: YES  Have you passed out or fallen: No  Progression of Symptoms: worsening  Accompanying Signs & Symptoms:  Heart palpitations or chest pain: YES- stated she thinks her heart beats faster when it happens  Nausea, vomiting: No  Weakness or lack of coordination in arms or legs: No  Vision or speech changes: No  Numbness or tingling: No  Ringing in ears (Tinnitus): No  Hearing Loss: No  History:   Head trauma/concussion history: No  Previous similar symptoms: No  Recent bleeding history: No  Any new medications (BP?): No  Precipitating factors:   Worse with activity: YES  Worse with head movement: No  Alleviating factors:   Does staying in a fixed position give relief: YES  Therapies tried and outcome: Dr. Seymour put her on iron pills, but they made her sick so she stopped taking them.   Denies chest pain or SOB.   Denies fever, chills, or night sweats.   Denies blood in stool.   No vomiting or diarrhea.         Review of Systems  Constitutional, HEENT, cardiovascular, pulmonary, GI, , musculoskeletal, neuro, skin, endocrine and psych systems are negative, except as otherwise noted.      Objective    /60 (BP Location: Right arm, Patient Position: Sitting, Cuff Size: Adult Regular)   Pulse 96   Temp 99.8  F (37.7  C) (Tympanic)   Resp 18   Ht 1.651 m (5' 5\")   Wt 70.6 kg (155 lb 11.2 oz)   SpO2 97%   BMI 25.91 kg/m    Body mass index is 25.91 kg/m .  Physical Exam   GENERAL: alert and no distress  EYES: Eyes grossly normal to inspection, PERRL and conjunctivae and sclerae normal  HENT: ear canals and TM's normal, nose and mouth without ulcers or lesions  NECK: no adenopathy, no asymmetry, masses, or scars  RESP: lungs clear to auscultation - no rales, rhonchi or " wheezes  CV: regular rate and rhythm, normal S1 S2, no S3 or S4, no murmur, click or rub, no peripheral edema  MS: no gross musculoskeletal defects noted, no edema  SKIN: no suspicious lesions or rashes  NEURO: Normal strength and tone, mentation intact and speech normal. CN II-XII grossly intact. CN II-XII grossly intact   PSYCH: mentation appears normal, affect normal/bright        Signed Electronically by: RAMA Mcdonald CNP

## 2024-09-12 ENCOUNTER — APPOINTMENT (OUTPATIENT)
Dept: LAB | Facility: OTHER | Age: 64
End: 2024-09-12
Payer: COMMERCIAL

## 2024-09-12 ENCOUNTER — TELEPHONE (OUTPATIENT)
Dept: FAMILY MEDICINE | Facility: OTHER | Age: 64
End: 2024-09-12

## 2024-09-12 ENCOUNTER — MYC MEDICAL ADVICE (OUTPATIENT)
Dept: FAMILY MEDICINE | Facility: OTHER | Age: 64
End: 2024-09-12

## 2024-09-12 DIAGNOSIS — D64.9 ANEMIA, UNSPECIFIED TYPE: Primary | ICD-10-CM

## 2024-09-12 DIAGNOSIS — D50.9 IRON DEFICIENCY ANEMIA, UNSPECIFIED IRON DEFICIENCY ANEMIA TYPE: Primary | ICD-10-CM

## 2024-09-12 DIAGNOSIS — D64.9 ANEMIA: Primary | ICD-10-CM

## 2024-09-12 DIAGNOSIS — D64.9 LOW HEMOGLOBIN: ICD-10-CM

## 2024-09-12 LAB
ALBUMIN SERPL BCG-MCNC: 4.5 G/DL (ref 3.5–5.2)
ALBUMIN UR-MCNC: NEGATIVE MG/DL
ALP SERPL-CCNC: 96 U/L (ref 40–150)
ALT SERPL W P-5'-P-CCNC: 21 U/L (ref 0–50)
ANION GAP SERPL CALCULATED.3IONS-SCNC: 12 MMOL/L (ref 7–15)
APPEARANCE UR: CLEAR
AST SERPL W P-5'-P-CCNC: 25 U/L (ref 0–45)
ATRIAL RATE - MUSE: 90 BPM
BASOPHILS # BLD AUTO: 0 10E3/UL (ref 0–0.2)
BASOPHILS NFR BLD AUTO: 0 %
BILIRUB SERPL-MCNC: 0.2 MG/DL
BILIRUB UR QL STRIP: NEGATIVE
BUN SERPL-MCNC: 33.6 MG/DL (ref 8–23)
CALCIUM SERPL-MCNC: 9.8 MG/DL (ref 8.8–10.4)
CHLORIDE SERPL-SCNC: 101 MMOL/L (ref 98–107)
COLOR UR AUTO: YELLOW
CREAT SERPL-MCNC: 1.45 MG/DL (ref 0.51–0.95)
CRP SERPL-MCNC: 15.33 MG/L
DIASTOLIC BLOOD PRESSURE - MUSE: NORMAL MMHG
EGFRCR SERPLBLD CKD-EPI 2021: 40 ML/MIN/1.73M2
EOSINOPHIL # BLD AUTO: 0.1 10E3/UL (ref 0–0.7)
EOSINOPHIL NFR BLD AUTO: 1 %
ERYTHROCYTE [DISTWIDTH] IN BLOOD BY AUTOMATED COUNT: 14.6 % (ref 10–15)
FERRITIN SERPL-MCNC: 8 NG/ML (ref 11–328)
FOLATE SERPL-MCNC: 15.8 NG/ML (ref 4.6–34.8)
GLUCOSE SERPL-MCNC: 102 MG/DL (ref 70–99)
GLUCOSE UR STRIP-MCNC: NEGATIVE MG/DL
HCO3 SERPL-SCNC: 22 MMOL/L (ref 22–29)
HCT VFR BLD AUTO: 24.7 % (ref 35–47)
HGB BLD-MCNC: 7.8 G/DL (ref 11.7–15.7)
HGB UR QL STRIP: NEGATIVE
IMM GRANULOCYTES # BLD: 0 10E3/UL
IMM GRANULOCYTES NFR BLD: 0 %
INTERPRETATION ECG - MUSE: NORMAL
IRON BINDING CAPACITY (ROCHE): 408 UG/DL (ref 240–430)
IRON SATN MFR SERPL: 4 % (ref 15–46)
IRON SERPL-MCNC: 18 UG/DL (ref 37–145)
KETONES UR STRIP-MCNC: NEGATIVE MG/DL
LEUKOCYTE ESTERASE UR QL STRIP: NEGATIVE
LYMPHOCYTES # BLD AUTO: 2 10E3/UL (ref 0.8–5.3)
LYMPHOCYTES NFR BLD AUTO: 15 %
MAGNESIUM SERPL-MCNC: 1.7 MG/DL (ref 1.7–2.3)
MCH RBC QN AUTO: 23.9 PG (ref 26.5–33)
MCHC RBC AUTO-ENTMCNC: 31.6 G/DL (ref 31.5–36.5)
MCV RBC AUTO: 76 FL (ref 78–100)
MONOCYTES # BLD AUTO: 1.1 10E3/UL (ref 0–1.3)
MONOCYTES NFR BLD AUTO: 8 %
NEUTROPHILS # BLD AUTO: 10.6 10E3/UL (ref 1.6–8.3)
NEUTROPHILS NFR BLD AUTO: 77 %
NITRATE UR QL: NEGATIVE
NRBC # BLD AUTO: 0 10E3/UL
NRBC BLD AUTO-RTO: 0 /100
P AXIS - MUSE: 61 DEGREES
PH UR STRIP: 7 [PH] (ref 5–7)
PLATELET # BLD AUTO: 469 10E3/UL (ref 150–450)
POTASSIUM SERPL-SCNC: 4.1 MMOL/L (ref 3.4–5.3)
PR INTERVAL - MUSE: 178 MS
PROT SERPL-MCNC: 7.1 G/DL (ref 6.4–8.3)
QRS DURATION - MUSE: 92 MS
QT - MUSE: 380 MS
QTC - MUSE: 464 MS
R AXIS - MUSE: 43 DEGREES
RBC # BLD AUTO: 3.27 10E6/UL (ref 3.8–5.2)
RETICS # AUTO: 0.03 10E6/UL (ref 0.03–0.1)
RETICS/RBC NFR AUTO: 1.1 % (ref 0.5–2)
SODIUM SERPL-SCNC: 135 MMOL/L (ref 135–145)
SP GR UR STRIP: 1.01 (ref 1–1.03)
SYSTOLIC BLOOD PRESSURE - MUSE: NORMAL MMHG
T AXIS - MUSE: 39 DEGREES
TSH SERPL DL<=0.005 MIU/L-ACNC: 0.78 UIU/ML (ref 0.3–4.2)
UROBILINOGEN UR STRIP-ACNC: 0.2 E.U./DL
VENTRICULAR RATE- MUSE: 90 BPM
VIT B12 SERPL-MCNC: 378 PG/ML (ref 232–1245)
WBC # BLD AUTO: 13.9 10E3/UL (ref 4–11)

## 2024-09-12 PROCEDURE — 81003 URINALYSIS AUTO W/O SCOPE: CPT | Performed by: NURSE PRACTITIONER

## 2024-09-12 RX ORDER — DIPHENHYDRAMINE HYDROCHLORIDE 50 MG/ML
50 INJECTION INTRAMUSCULAR; INTRAVENOUS
Status: CANCELLED
Start: 2024-09-12

## 2024-09-12 RX ORDER — EPINEPHRINE 1 MG/ML
0.3 INJECTION, SOLUTION, CONCENTRATE INTRAVENOUS EVERY 5 MIN PRN
Status: CANCELLED | OUTPATIENT
Start: 2024-09-12

## 2024-09-13 NOTE — TELEPHONE ENCOUNTER
I called Elsi today to discuss lab results of hemoglobin 7.8. Iron sat 4 and iron 18. Elsi feels fatigued and weak. She has a large event on Saturday of helping with a wedding. Discussed a blood transfusion and she would like to proceed. She knows that a blood transfusion is a short term fix as further testing needs to be done as to why she is anemic and iron deficient. No history of heart disease or gastric surgery. No signs of blood loss. Last colonoscopy 4/2023 with Dr. Fiore which was normal with recommendation of repeat in 5 years. Denies upper GI symptoms.     I called [facility] infusion and there is no chair available for her today or tomorrow for infusion.     I called [facility] infusion and she is able to get an infusion tomorrow. Phone blood consult obtained with 2 witnesses.     She was able to be transfused on the medical floor at [facility] today.     Discussed plan of care with Elsi that she will need EGD, Hematology consult, and iron transfusions.     I will include her PCP when routing note to keep him in the loop. She is seeing Dr. Seymour 9/30/24. She will need a hemoglobin check prior to appointment. I will order a CBC for 9-16-24. She has elevation in platelets and WBC and denies any sign of injection. Urine clear. No coughing. No abdominal pain. Elsi is aware that she needs close follow up.

## 2024-09-14 ENCOUNTER — HEALTH MAINTENANCE LETTER (OUTPATIENT)
Age: 64
End: 2024-09-14

## 2024-09-14 LAB
PATH REPORT.COMMENTS IMP SPEC: NORMAL
PATH REPORT.FINAL DX SPEC: NORMAL
PATH REPORT.MICROSCOPIC SPEC OTHER STN: NORMAL
PATH REPORT.MICROSCOPIC SPEC OTHER STN: NORMAL

## 2024-09-15 DIAGNOSIS — E78.5 HYPERLIPIDEMIA LDL GOAL <100: ICD-10-CM

## 2024-09-16 ENCOUNTER — LAB (OUTPATIENT)
Dept: LAB | Facility: OTHER | Age: 64
End: 2024-09-16
Payer: COMMERCIAL

## 2024-09-16 DIAGNOSIS — D64.9 LOW HEMOGLOBIN: ICD-10-CM

## 2024-09-16 LAB
BASOPHILS # BLD AUTO: 0.1 10E3/UL (ref 0–0.2)
BASOPHILS NFR BLD AUTO: 1 %
EOSINOPHIL # BLD AUTO: 0.3 10E3/UL (ref 0–0.7)
EOSINOPHIL NFR BLD AUTO: 4 %
ERYTHROCYTE [DISTWIDTH] IN BLOOD BY AUTOMATED COUNT: 15.9 % (ref 10–15)
HCT VFR BLD AUTO: 28.8 % (ref 35–47)
HGB BLD-MCNC: 9.3 G/DL (ref 11.7–15.7)
IMM GRANULOCYTES # BLD: 0 10E3/UL
IMM GRANULOCYTES NFR BLD: 0 %
LYMPHOCYTES # BLD AUTO: 1.7 10E3/UL (ref 0.8–5.3)
LYMPHOCYTES NFR BLD AUTO: 25 %
MCH RBC QN AUTO: 24.9 PG (ref 26.5–33)
MCHC RBC AUTO-ENTMCNC: 32.3 G/DL (ref 31.5–36.5)
MCV RBC AUTO: 77 FL (ref 78–100)
MONOCYTES # BLD AUTO: 0.7 10E3/UL (ref 0–1.3)
MONOCYTES NFR BLD AUTO: 10 %
NEUTROPHILS # BLD AUTO: 4.3 10E3/UL (ref 1.6–8.3)
NEUTROPHILS NFR BLD AUTO: 61 %
PLATELET # BLD AUTO: 389 10E3/UL (ref 150–450)
RBC # BLD AUTO: 3.74 10E6/UL (ref 3.8–5.2)
WBC # BLD AUTO: 7.1 10E3/UL (ref 4–11)

## 2024-09-16 PROCEDURE — 36415 COLL VENOUS BLD VENIPUNCTURE: CPT

## 2024-09-16 PROCEDURE — 85025 COMPLETE CBC W/AUTO DIFF WBC: CPT

## 2024-09-16 RX ORDER — ROSUVASTATIN CALCIUM 20 MG/1
20 TABLET, COATED ORAL DAILY
Qty: 90 TABLET | Refills: 0 | Status: SHIPPED | OUTPATIENT
Start: 2024-09-16

## 2024-09-16 NOTE — TELEPHONE ENCOUNTER
Crestor  Last Written Prescription Date: 3/25/24  Last Fill Quantity: 90 # of Refills: 1  Last Office Visit: 9/11/24

## 2024-09-17 ENCOUNTER — MYC REFILL (OUTPATIENT)
Dept: FAMILY MEDICINE | Facility: OTHER | Age: 64
End: 2024-09-17

## 2024-09-17 DIAGNOSIS — F41.9 ANXIETY: ICD-10-CM

## 2024-09-17 RX ORDER — LORAZEPAM 0.5 MG/1
0.5 TABLET ORAL
Qty: 28 TABLET | Refills: 0 | Status: SHIPPED | OUTPATIENT
Start: 2024-09-17

## 2024-09-17 NOTE — TELEPHONE ENCOUNTER
Ativan 0.5 MG      Last Written Prescription Date:  07/05/24  Last Fill Quantity: 28,   # refills: 0  Last Office Visit: 09/11/24  Future Office visit:    Next 5 appointments (look out 90 days)      Sep 30, 2024 3:00 PM  (Arrive by 2:45 PM)  Adult Preventative Visit with Donaldo Seymour MD  [facility] [place] ([facility] [place] ) [address]  [place] [address]  [phone]             Routing refill request to provider for review/approval because:  Drug not on the [facility], P or [facility] refill protocol or controlled substance

## 2024-09-18 ENCOUNTER — TELEPHONE (OUTPATIENT)
Dept: FAMILY MEDICINE | Facility: OTHER | Age: 64
End: 2024-09-18

## 2024-09-18 NOTE — TELEPHONE ENCOUNTER
12:08 PM    Reason for Call: OVERBOOK    Patient is having the following symptoms: Dizziness back and not feeling well again .    The patient is requesting an appointment for Thursday with Bernice Valdovinos.    Was an appointment offered for this call? No  If yes : Appointment type              Date    Preferred method for responding to this message: Telephone Call  What is your phone number ?  [phone]     If we cannot reach you directly, may we leave a detailed response at the number you provided? Yes    Can this message wait until your PCP/provider returns, if unavailable today? Not applicable    Diana Camp

## 2024-09-20 ENCOUNTER — HOSPITAL ENCOUNTER (EMERGENCY)
Facility: HOSPITAL | Age: 64
Discharge: HOME | End: 2024-09-20
Attending: EMERGENCY MEDICINE
Payer: COMMERCIAL

## 2024-09-20 ENCOUNTER — APPOINTMENT (OUTPATIENT)
Dept: CT IMAGING | Facility: HOSPITAL | Age: 64
End: 2024-09-20
Attending: EMERGENCY MEDICINE
Payer: COMMERCIAL

## 2024-09-20 VITALS
RESPIRATION RATE: 16 BRPM | DIASTOLIC BLOOD PRESSURE: 69 MMHG | SYSTOLIC BLOOD PRESSURE: 142 MMHG | TEMPERATURE: 98.3 F | WEIGHT: 152.56 LBS | OXYGEN SATURATION: 99 % | HEIGHT: 65 IN | BODY MASS INDEX: 25.42 KG/M2 | HEART RATE: 81 BPM

## 2024-09-20 DIAGNOSIS — R42 POSTURAL LIGHTHEADEDNESS: ICD-10-CM

## 2024-09-20 DIAGNOSIS — R06.00 DYSPNEA, UNSPECIFIED TYPE: ICD-10-CM

## 2024-09-20 LAB
ABO/RH(D): NORMAL
ANION GAP SERPL CALCULATED.3IONS-SCNC: 12 MMOL/L (ref 7–15)
ANTIBODY SCREEN: NEGATIVE
ATRIAL RATE - MUSE: 80 BPM
BASOPHILS # BLD AUTO: 0.1 10E3/UL (ref 0–0.2)
BASOPHILS NFR BLD AUTO: 1 %
BUN SERPL-MCNC: 35.2 MG/DL (ref 8–23)
CALCIUM SERPL-MCNC: 9.8 MG/DL (ref 8.8–10.4)
CHLORIDE SERPL-SCNC: 101 MMOL/L (ref 98–107)
CREAT SERPL-MCNC: 1.47 MG/DL (ref 0.51–0.95)
D DIMER PPP FEU-MCNC: 0.81 UG/ML FEU (ref 0–0.5)
DIASTOLIC BLOOD PRESSURE - MUSE: NORMAL MMHG
EGFRCR SERPLBLD CKD-EPI 2021: 40 ML/MIN/1.73M2
EOSINOPHIL # BLD AUTO: 0.2 10E3/UL (ref 0–0.7)
EOSINOPHIL NFR BLD AUTO: 2 %
ERYTHROCYTE [DISTWIDTH] IN BLOOD BY AUTOMATED COUNT: 16.7 % (ref 10–15)
GLUCOSE SERPL-MCNC: 125 MG/DL (ref 70–99)
HCO3 SERPL-SCNC: 22 MMOL/L (ref 22–29)
HCT VFR BLD AUTO: 29.6 % (ref 35–47)
HGB BLD-MCNC: 9.6 G/DL (ref 11.7–15.7)
HOLD SPECIMEN: NORMAL
IMM GRANULOCYTES # BLD: 0 10E3/UL
IMM GRANULOCYTES NFR BLD: 0 %
INTERPRETATION ECG - MUSE: NORMAL
LYMPHOCYTES # BLD AUTO: 2.1 10E3/UL (ref 0.8–5.3)
LYMPHOCYTES NFR BLD AUTO: 22 %
MCH RBC QN AUTO: 25.3 PG (ref 26.5–33)
MCHC RBC AUTO-ENTMCNC: 32.4 G/DL (ref 31.5–36.5)
MCV RBC AUTO: 78 FL (ref 78–100)
MONOCYTES # BLD AUTO: 0.7 10E3/UL (ref 0–1.3)
MONOCYTES NFR BLD AUTO: 7 %
NEUTROPHILS # BLD AUTO: 6.3 10E3/UL (ref 1.6–8.3)
NEUTROPHILS NFR BLD AUTO: 68 %
NRBC # BLD AUTO: 0 10E3/UL
NRBC BLD AUTO-RTO: 0 /100
NT-PROBNP SERPL-MCNC: <36 PG/ML (ref 0–900)
P AXIS - MUSE: 64 DEGREES
PLATELET # BLD AUTO: 433 10E3/UL (ref 150–450)
POTASSIUM SERPL-SCNC: 4.3 MMOL/L (ref 3.4–5.3)
PR INTERVAL - MUSE: 198 MS
QRS DURATION - MUSE: 92 MS
QT - MUSE: 392 MS
QTC - MUSE: 452 MS
R AXIS - MUSE: 39 DEGREES
RBC # BLD AUTO: 3.8 10E6/UL (ref 3.8–5.2)
SODIUM SERPL-SCNC: 135 MMOL/L (ref 135–145)
SPECIMEN EXPIRATION DATE: NORMAL
SYSTOLIC BLOOD PRESSURE - MUSE: NORMAL MMHG
T AXIS - MUSE: 45 DEGREES
TROPONIN T SERPL HS-MCNC: 12 NG/L
VENTRICULAR RATE- MUSE: 80 BPM
WBC # BLD AUTO: 9.3 10E3/UL (ref 4–11)

## 2024-09-20 PROCEDURE — 85014 HEMATOCRIT: CPT | Performed by: EMERGENCY MEDICINE

## 2024-09-20 PROCEDURE — 99284 EMERGENCY DEPT VISIT MOD MDM: CPT | Performed by: EMERGENCY MEDICINE

## 2024-09-20 PROCEDURE — 71275 CT ANGIOGRAPHY CHEST: CPT

## 2024-09-20 PROCEDURE — 84484 ASSAY OF TROPONIN QUANT: CPT | Performed by: EMERGENCY MEDICINE

## 2024-09-20 PROCEDURE — 83880 ASSAY OF NATRIURETIC PEPTIDE: CPT | Performed by: EMERGENCY MEDICINE

## 2024-09-20 PROCEDURE — 250N000011 HC RX IP 250 OP 636: Performed by: EMERGENCY MEDICINE

## 2024-09-20 PROCEDURE — 85025 COMPLETE CBC W/AUTO DIFF WBC: CPT | Performed by: EMERGENCY MEDICINE

## 2024-09-20 PROCEDURE — 85379 FIBRIN DEGRADATION QUANT: CPT | Performed by: EMERGENCY MEDICINE

## 2024-09-20 PROCEDURE — 96360 HYDRATION IV INFUSION INIT: CPT

## 2024-09-20 PROCEDURE — 86901 BLOOD TYPING SEROLOGIC RH(D): CPT | Performed by: EMERGENCY MEDICINE

## 2024-09-20 PROCEDURE — 258N000003 HC RX IP 258 OP 636: Performed by: EMERGENCY MEDICINE

## 2024-09-20 PROCEDURE — 86900 BLOOD TYPING SEROLOGIC ABO: CPT | Performed by: EMERGENCY MEDICINE

## 2024-09-20 PROCEDURE — 80048 BASIC METABOLIC PNL TOTAL CA: CPT | Performed by: EMERGENCY MEDICINE

## 2024-09-20 PROCEDURE — 93010 ELECTROCARDIOGRAM REPORT: CPT | Performed by: INTERNAL MEDICINE

## 2024-09-20 PROCEDURE — 93005 ELECTROCARDIOGRAM TRACING: CPT

## 2024-09-20 PROCEDURE — 99285 EMERGENCY DEPT VISIT HI MDM: CPT | Mod: 25

## 2024-09-20 PROCEDURE — 82310 ASSAY OF CALCIUM: CPT | Performed by: EMERGENCY MEDICINE

## 2024-09-20 PROCEDURE — 36415 COLL VENOUS BLD VENIPUNCTURE: CPT | Performed by: EMERGENCY MEDICINE

## 2024-09-20 RX ORDER — IOPAMIDOL 755 MG/ML
55 INJECTION, SOLUTION INTRAVASCULAR ONCE
Status: COMPLETED | OUTPATIENT
Start: 2024-09-20 | End: 2024-09-20

## 2024-09-20 RX ADMIN — IOPAMIDOL 55 ML: 755 INJECTION, SOLUTION INTRAVENOUS at 13:46

## 2024-09-20 RX ADMIN — SODIUM CHLORIDE 1000 ML: 9 INJECTION, SOLUTION INTRAVENOUS at 12:45

## 2024-09-20 ASSESSMENT — COLUMBIA-SUICIDE SEVERITY RATING SCALE - C-SSRS
2. HAVE YOU ACTUALLY HAD ANY THOUGHTS OF KILLING YOURSELF IN THE PAST MONTH?: NO
6. HAVE YOU EVER DONE ANYTHING, STARTED TO DO ANYTHING, OR PREPARED TO DO ANYTHING TO END YOUR LIFE?: NO
1. IN THE PAST MONTH, HAVE YOU WISHED YOU WERE DEAD OR WISHED YOU COULD GO TO SLEEP AND NOT WAKE UP?: NO

## 2024-09-20 ASSESSMENT — ACTIVITIES OF DAILY LIVING (ADL)
ADLS_ACUITY_SCORE: 37
ADLS_ACUITY_SCORE: 37

## 2024-09-20 NOTE — ED TRIAGE NOTES
Pt presents that she saw her PCP and HGB and iron was low.  Last Thursday had blood transfusion.  Today pt feels SOB and is dizzy feeling like she did last time when she had to have the transfusion Patient has appointment with Dr. Bullock Thursday

## 2024-09-20 NOTE — DISCHARGE INSTRUCTIONS
You were seen in the emergency department for positional lightheadedness and shortness of breath with exertion.  Your evaluation revealed that your blood counts are appearing stable at this time.  We perform some additional testing including heart monitoring, other lab tests and a CT scan of your chest which were negative for any serious or life-threatening cause of the symptoms such as heart attack, electrolyte problems, severe anemia, or blood clots.  We are glad that you are feeling a little better with IV fluid.  Please try to slightly increase your liquid intake at home to see if this helps with symptoms.  We agree with your plan to follow-up for a consultation and possible endoscopy to continue workup for your anemia.  Please also follow-up with your primary after this emergency department visit to review any ongoing symptoms and discuss further workup for these symptoms if persistent.

## 2024-09-20 NOTE — ED NOTES
Patient here today with her daughter. Patient changed into gown and call light with in reach.    Patient here today due to dizziness and shortness of breath. States that last week she found out that her iron and hgb was low and had a blood transfusion. States she was dizzy and short of breath before the transfusion but shortly after the transfusion those symptoms went away. States that these symptoms came back the last couple days. Denies black tarry stools or does not have any nausea or vomiting. Denies chest pain.

## 2024-09-20 NOTE — ED PROVIDER NOTES
EMERGENCY DEPARTMENT ENCOUNTER      NAME: Elsi Krause  AGE: 64 year old female  YOB: 1960  MRN: [id number]  EVALUATION DATE & TIME: 2024 12:12 PM    PCP: Donaldo Seymour    ED PROVIDER: Huey Ahmadi M.D.      Chief Complaint   Patient presents with    Shortness of Breath    Dizziness    Abnormal Labs                FINAL IMPRESSION:  1. Postural lightheadedness    2. Dyspnea, unspecified type          ED COURSE & MEDICAL DECISION MAKIN year old female presents to the Emergency Department for evaluation of lightheadedness and shortness of breath.  Patient is vitally stable and well-appearing when she arrives to the emergency department.  Recent diagnosis of iron deficiency anemia and received a blood transfusion 1 week ago for this.  She reports symptoms that feel reminiscent of her visit last week including postural lightheadedness and some intermittent exertional shortness of breath.  Patient's hemoglobin here is actually a bit improved from recent visits at 9.6. Cardiopulmonary exam is unrevealing.  EKG shows sinus rhythm without any acute ischemic changes.  High-sensitivity troponin is within normal limits reassuring against ACS especially in the absence of chest pain.  D-dimer is moderately elevated, prompting follow-up CT pulmonary angiogram which was negative for PE or other acute thoracic process.  Patient received some IV fluids and subsequently was feeling improved, less lightheaded and able to get up more comfortably with minimal ongoing symptoms.  Patient has established surgery follow-up for consideration of endoscopy for workup of iron deficiency anemia.  Would benefit from continued follow-up there and with primary but at this time was comfortable with plan for discharge.  Return precautions were reviewed the patient was discharged in stable condition.    At the conclusion of the encounter I discussed the results of all of the tests and the disposition. The questions  were answered. The patient or family acknowledged understanding and was agreeable with the care plan.         MEDICATIONS GIVEN IN THE EMERGENCY:  Medications   sodium chloride 0.9% BOLUS 1,000 mL (0 mLs Intravenous Stopped 9/20/24 7908)   iopamidol (ISOVUE-370) solution 55 mL (55 mLs Intravenous $Given 9/20/24 1346)   sodium chloride (PF) 0.9% PF flush 100 mL (100 mLs Intravenous $Given 9/20/24 1346)       NEW PRESCRIPTIONS STARTED AT TODAY'S ER VISIT  Discharge Medication List as of 9/20/2024  2:08 PM             =================================================================    HPI    Patient information was obtained from: Patient      Elsi Krause is a 64 year old female who presents to this ED today for evaluation of shortness of breath and lightheadedness.  Patient reports she has been recently diagnosed with iron deficiency and anemia, she received a blood transfusion 8 days ago at the hospital.  She was on oral iron supplements but these made her too nauseated and caused abdominal cramping so these were discontinued.  She reports over the last few days she has felt increasingly short of breath with exertion.  She is also having positional lightheadedness especially when she stands up quickly.  Has not actually passed out.  During this time she has not experienced any chest pain.  She denies any cough.  She said she had a low-grade fever around the time of her blood transfusion last week but nothing current.  Reports cramping in her legs intermittently with exertion.  Denies leg swelling.      REVIEW OF SYSTEMS   All systems reviewed and negative except as noted in HPI.    PAST MEDICAL HISTORY:  Past Medical History:   Diagnosis Date    ACP (advance care planning) 7/19/2016    Advance Care Planning 7/19/2016: ACP Review of Chart / Resources Provided:  Reviewed chart for advance care plan.  Elsi Krause has no plan or code status on file. Discussed available resources and provided with information.  Confirmed code status reflects current choices pending further ACP discussions.  Confirmed/documented legally designated decision makers.  Added by CASTILLO REYNA       Degenerative disc disease, lumbar     Depression     Drug overdose 4/20/2015    Hyperlipidemia     Hypertension     Leiomyoma of uterus, unspecified 9/19/2006    IMO Update 10/11    Osteoarthritis of ankle 8/22/2006    IMO Update 10/11    Urinary tract infection, site not specified 7/22/2002    Also 9/11/01, 11/18/04 IMO Update 10/11       PAST SURGICAL HISTORY:  Past Surgical History:   Procedure Laterality Date    CARPAL TUNNEL RELEASE RT/LT Right     CERVICAL FUSION      COLONOSCOPY N/A 3/23/2023    Procedure: COLONOSCOPY;  Surgeon: Jose Angel Fiore MD;  Location: [facility] OR    ENDOMETRIAL SAMPLING (BIOPSY)  12/27/2001    FOOT SURGERY Bilateral 01/01/1973    HC EXCISE CUTANEOUS NEUROMA  11/05/1997    Times 3     TUBAL LIGATION Bilateral 04/01/1989           CURRENT MEDICATIONS:    No current facility-administered medications for this encounter.     Current Outpatient Medications   Medication Sig Dispense Refill    albuterol (PROAIR HFA/PROVENTIL HFA/VENTOLIN HFA) 108 (90 Base) MCG/ACT inhaler Inhale 2 puffs into the lungs every 6 hours as needed for cough or wheezing 18 g 0    allopurinol (ZYLOPRIM) 300 MG tablet Take 1 tablet by mouth once daily 90 tablet 3    Aspirin 325 MG CAPS Take 325 mg by mouth daily      colchicine (MITIGARE) 0.6 MG capsule Take 0.6 mg by mouth daily      cyclobenzaprine (FLEXERIL) 10 MG tablet Take 1 tablet (10 mg) by mouth 3 times daily as needed for muscle spasms 90 tablet 0    ferrous sulfate (FEROSUL) 325 (65 Fe) MG tablet Take 1 tablet (325 mg) by mouth Every Mon, Wed, Fri Morning 30 tablet 1    lisinopril-hydrochlorothiazide (ZESTORETIC) 20-25 MG tablet Take 1 tablet by mouth once daily 90 tablet 3    LORazepam (ATIVAN) 0.5 MG tablet Take 1 tablet (0.5 mg) by mouth nightly as needed for anxiety. 28 tablet 0    meloxicam  (MOBIC) 15 MG tablet Take 1 tablet by mouth once daily 90 tablet 0    omeprazole (PRILOSEC) 40 MG DR capsule Take 1 capsule by mouth once daily 90 capsule 3    rosuvastatin (CRESTOR) 20 MG tablet Take 1 tablet by mouth once daily 90 tablet 0         ALLERGIES:  Allergies   Allergen Reactions    Indomethacin Nausea and Vomiting       FAMILY HISTORY:  Family History   Problem Relation Age of Onset    Diabetes Mother     Hypertension Mother     Hyperlipidemia Mother     Diabetes Father     Coronary Artery Disease Father     Hypertension Father     Hyperlipidemia Father     Chronic Obstructive Pulmonary Disease Father     Cancer - colorectal Father     Prostate Cancer Paternal Grandfather     Diabetes Sister     Hypertension Sister     Hyperlipidemia Sister     Neurologic Disorder Son         Cerebral palsy       SOCIAL HISTORY:   Social History     Socioeconomic History    Marital status: Single     Spouse name: Justin    Number of children: 3    Years of education: 15   Occupational History     Employer: [employer]   Tobacco Use    Smoking status: Former     Passive exposure: Past    Smokeless tobacco: Never   Vaping Use    Vaping status: Never Used   Substance and Sexual Activity    Alcohol use: Yes     Comment: Social    Drug use: No    Sexual activity: Yes     Partners: Male     Social Determinants of Health     Financial Resource Strain: Unknown (3/20/2024)    Financial Resource Strain     Within the past 12 months, have you or your family members you live with been unable to get utilities (heat, electricity) when it was really needed?: Patient declined   Food Insecurity: Low Risk  (3/20/2024)    Food Insecurity     Within the past 12 months, did you worry that your food would run out before you got money to buy more?: No     Within the past 12 months, did the food you bought just not last and you didn t have money to get more?: Patient declined   Transportation Needs: Unknown (3/20/2024)     "Transportation Needs     Within the past 12 months, has lack of transportation kept you from medical appointments, getting your medicines, non-medical meetings or appointments, work, or from getting things that you need?: Patient declined   Interpersonal Safety: Low Risk  (7/5/2024)    Interpersonal Safety     Do you feel physically and emotionally safe where you currently live?: Yes     Within the past 12 months, have you been hit, slapped, kicked or otherwise physically hurt by someone?: No     Within the past 12 months, have you been humiliated or emotionally abused in other ways by your partner or ex-partner?: No   Housing Stability: Low Risk  (3/20/2024)    Housing Stability     Do you have housing? : Yes     Are you worried about losing your housing?: No       VITALS:  /69   Pulse 81   Temp 98.3  F (36.8  C) (Tympanic)   Resp 16   Ht 1.651 m (5' 5\")   Wt 69.2 kg (152 lb 8.9 oz)   SpO2 99%   BMI 25.39 kg/m      PHYSICAL EXAM    Constitutional: Well developed, Well nourished, NAD.  HENT: Normocephalic, Atraumatic. Neck Supple.  Eyes: EOMI, Conjunctiva normal.  Respiratory: Breathing comfortably on room air. Speaks full sentences easily. Lungs clear to ascultation.  Cardiovascular: Normal heart rate, Regular rhythm. No peripheral edema.  Abdomen: Soft, nontender  Musculoskeletal: Good range of motion in all major joints. No major deformities noted.  Integument: Warm, Dry.  Neurologic: Alert & awake, Normal motor function, Normal sensory function, No focal deficits noted.   Psychiatric: Cooperative. Affect appropriate.     LAB:  All pertinent labs reviewed and interpreted.  Labs Ordered and Resulted from Time of ED Arrival to Time of ED Departure   BASIC METABOLIC PANEL - Abnormal       Result Value    Sodium 135      Potassium 4.3      Chloride 101      Carbon Dioxide (CO2) 22      Anion Gap 12      Urea Nitrogen 35.2 (*)     Creatinine 1.47 (*)     GFR Estimate 40 (*)     Calcium 9.8      Glucose " 125 (*)    CBC WITH PLATELETS AND DIFFERENTIAL - Abnormal    WBC Count 9.3      RBC Count 3.80      Hemoglobin 9.6 (*)     Hematocrit 29.6 (*)     MCV 78      MCH 25.3 (*)     MCHC 32.4      RDW 16.7 (*)     Platelet Count 433      % Neutrophils 68      % Lymphocytes 22      % Monocytes 7      % Eosinophils 2      % Basophils 1      % Immature Granulocytes 0      NRBCs per 100 WBC 0      Absolute Neutrophils 6.3      Absolute Lymphocytes 2.1      Absolute Monocytes 0.7      Absolute Eosinophils 0.2      Absolute Basophils 0.1      Absolute Immature Granulocytes 0.0      Absolute NRBCs 0.0     D DIMER QUANTITATIVE - Abnormal    D-Dimer Quantitative 0.81 (*)    NT PROBNP INPATIENT - Normal    N terminal Pro BNP Inpatient <36     TROPONIN T, HIGH SENSITIVITY - Normal    Troponin T, High Sensitivity 12     TYPE AND SCREEN, ADULT    ABO/RH(D) O POS      Antibody Screen Negative      SPECIMEN EXPIRATION DATE [id number]     ABO/RH TYPE AND SCREEN       RADIOLOGY:  Reviewed all pertinent imaging. Please see official radiology report.  CT Chest Pulmonary Embolism w Contrast   Final Result   IMPRESSION:      No acute pulmonary emboli to the subsegmental level.      JEROME CROUCH MD            SYSTEM ID:  [id number]          EKG:    Performed at: 1235    Impression: Sinus rhythm, normal ECG    Rate: 80  Rhythm: Sinus  Axis: Normal  ID Interval: 198  QRS Interval: 92  QTc Interval: 452  ST Changes: None significant  Comparison: No significant change from 9/11/24    I have independently reviewed and interpreted the EKG(s) documented above.          Huey Ahmadi M.D.  Emergency Medicine  [facility]  [address] [place] [id number] [phone]  Dept: [phone]       Huey Ahmadi MD  09/20/24 0246

## 2024-09-23 ENCOUNTER — LAB (OUTPATIENT)
Dept: LAB | Facility: OTHER | Age: 64
End: 2024-09-23
Payer: COMMERCIAL

## 2024-09-23 DIAGNOSIS — D64.9 ANEMIA, UNSPECIFIED TYPE: ICD-10-CM

## 2024-09-23 PROCEDURE — 36415 COLL VENOUS BLD VENIPUNCTURE: CPT

## 2024-09-23 PROCEDURE — 85025 COMPLETE CBC W/AUTO DIFF WBC: CPT

## 2024-09-24 LAB
BASOPHILS # BLD AUTO: 0.1 10E3/UL (ref 0–0.2)
BASOPHILS NFR BLD AUTO: 1 %
EOSINOPHIL # BLD AUTO: 0.2 10E3/UL (ref 0–0.7)
EOSINOPHIL NFR BLD AUTO: 3 %
ERYTHROCYTE [DISTWIDTH] IN BLOOD BY AUTOMATED COUNT: 16.5 % (ref 10–15)
HCT VFR BLD AUTO: 30.1 % (ref 35–47)
HGB BLD-MCNC: 9.7 G/DL (ref 11.7–15.7)
IMM GRANULOCYTES # BLD: 0 10E3/UL
IMM GRANULOCYTES NFR BLD: 0 %
LYMPHOCYTES # BLD AUTO: 1.6 10E3/UL (ref 0.8–5.3)
LYMPHOCYTES NFR BLD AUTO: 21 %
MCH RBC QN AUTO: 24.8 PG (ref 26.5–33)
MCHC RBC AUTO-ENTMCNC: 32.2 G/DL (ref 31.5–36.5)
MCV RBC AUTO: 77 FL (ref 78–100)
MONOCYTES # BLD AUTO: 0.7 10E3/UL (ref 0–1.3)
MONOCYTES NFR BLD AUTO: 8 %
NEUTROPHILS # BLD AUTO: 5.4 10E3/UL (ref 1.6–8.3)
NEUTROPHILS NFR BLD AUTO: 68 %
PLATELET # BLD AUTO: 480 10E3/UL (ref 150–450)
RBC # BLD AUTO: 3.91 10E6/UL (ref 3.8–5.2)
WBC # BLD AUTO: 7.9 10E3/UL (ref 4–11)

## 2024-09-24 NOTE — TELEPHONE ENCOUNTER
----- Message from Beata ROBERTS sent at 9/24/2024  9:39 AM CDT -----  Regarding: Orders  Can we see if Jorgito will order since taylor is out?

## 2024-09-26 ENCOUNTER — OFFICE VISIT (OUTPATIENT)
Dept: SURGERY | Facility: OTHER | Age: 64
End: 2024-09-26
Attending: SURGERY
Payer: COMMERCIAL

## 2024-09-26 ENCOUNTER — ONCOLOGY VISIT (OUTPATIENT)
Dept: ONCOLOGY | Facility: OTHER | Age: 64
End: 2024-09-26
Attending: NURSE PRACTITIONER
Payer: COMMERCIAL

## 2024-09-26 ENCOUNTER — PREP FOR PROCEDURE (OUTPATIENT)
Dept: SURGERY | Facility: OTHER | Age: 64
End: 2024-09-26

## 2024-09-26 VITALS
DIASTOLIC BLOOD PRESSURE: 64 MMHG | HEART RATE: 74 BPM | HEIGHT: 65 IN | SYSTOLIC BLOOD PRESSURE: 130 MMHG | BODY MASS INDEX: 25.33 KG/M2 | OXYGEN SATURATION: 100 % | RESPIRATION RATE: 14 BRPM | WEIGHT: 152 LBS

## 2024-09-26 VITALS
WEIGHT: 154.1 LBS | TEMPERATURE: 97.2 F | HEIGHT: 65 IN | HEART RATE: 83 BPM | SYSTOLIC BLOOD PRESSURE: 122 MMHG | DIASTOLIC BLOOD PRESSURE: 64 MMHG | BODY MASS INDEX: 25.67 KG/M2 | OXYGEN SATURATION: 98 %

## 2024-09-26 DIAGNOSIS — D50.0 IRON DEFICIENCY ANEMIA DUE TO CHRONIC BLOOD LOSS: ICD-10-CM

## 2024-09-26 DIAGNOSIS — D64.9 ANEMIA: Primary | ICD-10-CM

## 2024-09-26 DIAGNOSIS — D64.9 ANEMIA, UNSPECIFIED TYPE: Primary | ICD-10-CM

## 2024-09-26 DIAGNOSIS — D64.9 LOW HEMOGLOBIN: ICD-10-CM

## 2024-09-26 DIAGNOSIS — D50.9 IRON DEFICIENCY ANEMIA, UNSPECIFIED IRON DEFICIENCY ANEMIA TYPE: ICD-10-CM

## 2024-09-26 DIAGNOSIS — D64.9 LOW HEMOGLOBIN: Primary | ICD-10-CM

## 2024-09-26 PROCEDURE — 86340 INTRINSIC FACTOR ANTIBODY: CPT | Mod: 90 | Performed by: INTERNAL MEDICINE

## 2024-09-26 PROCEDURE — 83921 ORGANIC ACID SINGLE QUANT: CPT | Performed by: INTERNAL MEDICINE

## 2024-09-26 PROCEDURE — 99205 OFFICE O/P NEW HI 60 MIN: CPT | Performed by: INTERNAL MEDICINE

## 2024-09-26 PROCEDURE — 99203 OFFICE O/P NEW LOW 30 MIN: CPT | Performed by: CLINICAL NURSE SPECIALIST

## 2024-09-26 PROCEDURE — 36415 COLL VENOUS BLD VENIPUNCTURE: CPT | Performed by: INTERNAL MEDICINE

## 2024-09-26 RX ORDER — EPINEPHRINE 1 MG/ML
0.3 INJECTION, SOLUTION, CONCENTRATE INTRAVENOUS EVERY 5 MIN PRN
OUTPATIENT
Start: 2024-10-03

## 2024-09-26 RX ORDER — ALBUTEROL SULFATE 0.83 MG/ML
2.5 SOLUTION RESPIRATORY (INHALATION)
OUTPATIENT
Start: 2024-10-03

## 2024-09-26 RX ORDER — HEPARIN SODIUM (PORCINE) LOCK FLUSH IV SOLN 100 UNIT/ML 100 UNIT/ML
5 SOLUTION INTRAVENOUS
OUTPATIENT
Start: 2024-10-03

## 2024-09-26 RX ORDER — MEPERIDINE HYDROCHLORIDE 25 MG/ML
25 INJECTION INTRAMUSCULAR; INTRAVENOUS; SUBCUTANEOUS EVERY 30 MIN PRN
OUTPATIENT
Start: 2024-10-03

## 2024-09-26 RX ORDER — ALBUTEROL SULFATE 90 UG/1
1-2 AEROSOL, METERED RESPIRATORY (INHALATION)
Start: 2024-10-03

## 2024-09-26 RX ORDER — DIPHENHYDRAMINE HYDROCHLORIDE 50 MG/ML
50 INJECTION INTRAMUSCULAR; INTRAVENOUS
Start: 2024-10-03

## 2024-09-26 RX ORDER — HEPARIN SODIUM,PORCINE 10 UNIT/ML
5-20 VIAL (ML) INTRAVENOUS DAILY PRN
OUTPATIENT
Start: 2024-10-03

## 2024-09-26 RX ORDER — METHYLPREDNISOLONE SODIUM SUCCINATE 125 MG/2ML
125 INJECTION, POWDER, LYOPHILIZED, FOR SOLUTION INTRAMUSCULAR; INTRAVENOUS
Start: 2024-10-03

## 2024-09-26 ASSESSMENT — PAIN SCALES - GENERAL
PAINLEVEL: NO PAIN (0)
PAINLEVEL: NO PAIN (0)

## 2024-09-26 ASSESSMENT — PATIENT HEALTH QUESTIONNAIRE - PHQ9: SUM OF ALL RESPONSES TO PHQ QUESTIONS 1-9: 8

## 2024-09-26 NOTE — NURSING NOTE
"Oncology Rooming Note    September 26, 2024 1:07 PM   Elsi Krause is a 64 year old female who presents for:    Chief Complaint   Patient presents with    Hematology     New consult. Iron deficiency anemia     Initial Vitals: /64 (BP Location: Right arm, Patient Position: Sitting, Cuff Size: Adult Regular)   Pulse 83   Temp 97.2  F (36.2  C) (Tympanic)   Ht 1.651 m (5' 5\")   Wt 69.9 kg (154 lb 1.6 oz)   SpO2 98%   BMI 25.64 kg/m   Estimated body mass index is 25.64 kg/m  as calculated from the following:    Height as of this encounter: 1.651 m (5' 5\").    Weight as of this encounter: 69.9 kg (154 lb 1.6 oz). Body surface area is 1.79 meters squared.  No Pain (0) Comment: Data Unavailable   No LMP recorded. Patient is postmenopausal.  Allergies reviewed: Yes  Medications reviewed: Yes    Medications: Medication refills not needed today.  Pharmacy name entered into [facility]:    [id number] - [place], [place] - [address]  [address] [facility]  [address]  [id number] - [place], [place] - [address]     Frailty Screening:   Is the patient here for a new oncology consult visit in cancer care? 2. No      Clinical concerns: none       Sade Schwab LPN              "

## 2024-09-26 NOTE — PROGRESS NOTES
Surgery Consult Clinic Note      RE: Elsi Krause  : 1960  GAUTAM: 2024      Chief Complaint:  Anemia    History of Present Illness:  I am seeing Elsi Krause at the request of Rafia Valdovinos NP for evaluation regarding anemia and consideration for esophagogastroduodenoscopy.  Her hemoglobin has been in the 9 range all summer, but dipped to 7.8 a couple weeks ago.  She did receive packed cells when she dipped to 7.8.   Current symptoms include change in voice, tired, weakness, cold intolerance. She has been treated for reflux with omeprazole.  No previous abdominal surgeries.  She specifically denies fever, chills, nausea, vomiting, chest pain, shortness of breath or palpitations.     Medical history:  Past Medical History:   Diagnosis Date    ACP (advance care planning) 2016    Advance Care Planning 2016: ACP Review of Chart / Resources Provided:  Reviewed chart for advance care plan.  Elsi Krause has no plan or code status on file. Discussed available resources and provided with information. Confirmed code status reflects current choices pending further ACP discussions.  Confirmed/documented legally designated decision makers.  Added by CASTILLO REYNA       Degenerative disc disease, lumbar     Depression     Drug overdose 2015    Hyperlipidemia     Hypertension     Leiomyoma of uterus, unspecified 2006    IMO Update 10/11    Osteoarthritis of ankle 2006    IMO Update 10/11    Urinary tract infection, site not specified 2002    Also 01, 04 IMO Update 10/11       Surgical history:  Past Surgical History:   Procedure Laterality Date    CARPAL TUNNEL RELEASE RT/LT Right     CERVICAL FUSION      COLONOSCOPY N/A 3/23/2023    Procedure: COLONOSCOPY;  Surgeon: Jose Angel Fiore MD;  Location: [facility]    ENDOMETRIAL SAMPLING (BIOPSY)  2001    FOOT SURGERY Bilateral 1973    HC EXCISE CUTANEOUS NEUROMA  1997    Times 3     TUBAL LIGATION Bilateral  "04/01/1989       Family history:  Family History   Problem Relation Age of Onset    Diabetes Mother     Hypertension Mother     Hyperlipidemia Mother     Diabetes Father     Coronary Artery Disease Father     Hypertension Father     Hyperlipidemia Father     Chronic Obstructive Pulmonary Disease Father     Cancer - colorectal Father     Prostate Cancer Paternal Grandfather     Diabetes Sister     Hypertension Sister     Hyperlipidemia Sister     Neurologic Disorder Son         Cerebral palsy       Medications:  [unfilled]  Allergies:  The patient is allergic to indomethacin.    Social history:  Social History     Tobacco Use    Smoking status: Former     Passive exposure: Past    Smokeless tobacco: Never   Substance Use Topics    Alcohol use: Yes     Comment: Social         Review of Systems:    Constitutional: Negative for fever, chills   HENT: Negative for ear pain, congestion, sore throat    Eyes: Negative for blurred vision, double vision   Respiratory: Negative for cough, hemoptysis, shortness of breath, wheezing and stridor.    Cardiovascular: Negative for chest pain, palpitations and orthopnea.   Gastrointestinal: Negative for nausea, vomiting, and blood in stool.   Genitourinary: Negative for urgency, frequency and hematuria.   Musculoskeletal: Negative for myalgias, back pain and joint pain.   Neurological: Negative for tingling, speech change    Endo/Heme/Allergies: Does not bruise/bleed easily.   Psychiatric/Behavioral: Negative for depression, suicidal ideas and hallucinations. The patient is not nervous/anxious.    Physical Examination:  /64 (BP Location: Right arm, Cuff Size: Adult Regular)   Pulse 74   Resp 14   Ht 1.651 m (5' 5\")   Wt 68.9 kg (152 lb)   SpO2 100%   BMI 25.29 kg/m    General: Alert and orientedx4, no acute distress, well-developed/well-nourished, ambulating without assistance  HEENT: normocephalic atraumatic, extraocular movements intact, PERRL Sclerae anicteric; Trachea " midline  Chest:   Clear to auscultation bilaterally.  Cardiac: S1S2 , regular rate and rhythm without additional sounds  Abdomen: Soft, non-tender, non-distended  Extremities: Cursory exam unremarkable.  No peripheral edema noted.  Skin: Warm, dry, less than 2 sec cap refill  Neuro: CN 2-12 grossly intact, no focal deficit, GCS 15  Psych: Pleasant, calm, asks appropriate questions    Assessment/Plan:  #1 Esophagogastroduodenoscopy    She had a normal colonoscopy on 3/23/2023.  I discussed this with Dr. Hernandez and he agrees that she does not need a colonoscopy at this time.      The indications, risks, benefits and technical aspects of esophagogastroduodenoscopy were reviewed, her questions were asked and answered. Antral biopsy for histologic examination will be performed and the place of H. pylori in gastritis was discussed. Preoperative preparation, npo after midnight preceding the date was discussed and a request made to hold aspirin containing agents one week prior to ameliorate antiplatelet effect.  We will proceed with scheduling esophagogastroduodenoscopy with Dr. Hernandez at a mutually convenient time.      Nereida Vieyra [facility]  [address] [place] [address]    [id number]    Referring Provider:  RAMA Mcdonald [facility]  [address] [place] [address]     Primary Care Provider:  Donaldo Seymour

## 2024-09-26 NOTE — PROGRESS NOTES
HEMATOLOGY CONSULT NOTE  Sep 26, 2024    Reason for consult: Anemia    HISTORY OF PRESENT ILLNESS  Elsi Krause is a 64 year old female with PMH as stated below who was seen in the hematology clinic for anemia    Her history in short is as follows:    He was not show progressive anemia starting from 2/22/2024.  At that time found to have a hemoglobin of 11.4 with MCV of 81.  Ferritin checked on 5/2/2024 was 45 with an iron saturation of 12 however it has decreased to a ferritin of 8 on 9/11/2024 with an iron saturation of 4.    She was started on iron replacement in May 2024 but was not able to tolerate it.  She  has been feeling very fatigued, cold, dizzy.  Denies any shortness of breath or chest pain.  Did require 1 unit of PRBC on 9/11/2024.  At that time her hemoglobin was 7.8.  Denies any blood in stools, change in color stools, or blood in urine.  Denies any abdominal pain, weight loss or appetite loss.    7/12/2024: CT abdomen pelvis with contrast:IMPRESSION: No intraperitoneal masses or inflammatory changes.     Her last colonoscopy was on 3/23/2023.  At that time she was found to have mild left-sided diverticulosis.  Plan to repeat colonoscopy in 5 years.  She has not had an EGD yet.    REVIEW OF SYSTEMS  A 12-point ROS negative except as in HPI      Current Outpatient Medications   Medication Sig Dispense Refill    albuterol (PROAIR HFA/PROVENTIL HFA/VENTOLIN HFA) 108 (90 Base) MCG/ACT inhaler Inhale 2 puffs into the lungs every 6 hours as needed for cough or wheezing 18 g 0    allopurinol (ZYLOPRIM) 300 MG tablet Take 1 tablet by mouth once daily 90 tablet 3    Aspirin 325 MG CAPS Take 325 mg by mouth daily      colchicine (MITIGARE) 0.6 MG capsule Take 0.6 mg by mouth daily      cyclobenzaprine (FLEXERIL) 10 MG tablet Take 1 tablet (10 mg) by mouth 3 times daily as needed for muscle spasms 90 tablet 0    ferrous sulfate (FEROSUL) 325 (65 Fe) MG tablet Take 1 tablet (325 mg) by mouth Every Mon, Wed, Fri  Morning 30 tablet 1    lisinopril-hydrochlorothiazide (ZESTORETIC) 20-25 MG tablet Take 1 tablet by mouth once daily 90 tablet 3    LORazepam (ATIVAN) 0.5 MG tablet Take 1 tablet (0.5 mg) by mouth nightly as needed for anxiety. 28 tablet 0    meloxicam (MOBIC) 15 MG tablet Take 1 tablet by mouth once daily 90 tablet 0    omeprazole (PRILOSEC) 40 MG DR capsule Take 1 capsule by mouth once daily 90 capsule 3    rosuvastatin (CRESTOR) 20 MG tablet Take 1 tablet by mouth once daily 90 tablet 0       Allergies   Allergen Reactions    Indomethacin Nausea and Vomiting     Immunization History   Administered Date(s) Administered    COVID-19 Vaccine (Tricia) 03/09/2021, 12/09/2021    Influenza Vaccine >6 months,quad, PF 11/16/2020    TDAP Vaccine (Adacel) 02/02/2011    TDAP Vaccine (Boostrix) 02/02/2011       Past Medical History:   Diagnosis Date    ACP (advance care planning) 7/19/2016    Advance Care Planning 7/19/2016: ACP Review of Chart / Resources Provided:  Reviewed chart for advance care plan.  Elsi Krause has no plan or code status on file. Discussed available resources and provided with information. Confirmed code status reflects current choices pending further ACP discussions.  Confirmed/documented legally designated decision makers.  Added by CASTILLO REYNA       Degenerative disc disease, lumbar     Depression     Drug overdose 4/20/2015    Hyperlipidemia     Hypertension     Leiomyoma of uterus, unspecified 9/19/2006    IMO Update 10/11    Osteoarthritis of ankle 8/22/2006    IMO Update 10/11    Urinary tract infection, site not specified 7/22/2002    Also 9/11/01, 11/18/04 IMO Update 10/11       Past Surgical History:   Procedure Laterality Date    CARPAL TUNNEL RELEASE RT/LT Right     CERVICAL FUSION      COLONOSCOPY N/A 3/23/2023    Procedure: COLONOSCOPY;  Surgeon: Jose Angel Fiore MD;  Location: [facility]    ENDOMETRIAL SAMPLING (BIOPSY)  12/27/2001    FOOT SURGERY Bilateral 01/01/1973    HC EXCISE CUTANEOUS  "NEUROMA  11/05/1997    Times 3     TUBAL LIGATION Bilateral 04/01/1989       SOCIAL HISTORY  History   Smoking Status    Former   Smokeless Tobacco    Never    Social History    Substance and Sexual Activity      Alcohol use: Yes        Comment: Social     History   Drug Use No       FAMILY HISTORY  Family History   Problem Relation Age of Onset    Diabetes Mother     Hypertension Mother     Hyperlipidemia Mother     Diabetes Father     Coronary Artery Disease Father     Hypertension Father     Hyperlipidemia Father     Chronic Obstructive Pulmonary Disease Father     Cancer - colorectal Father     Prostate Cancer Paternal Grandfather     Diabetes Sister     Hypertension Sister     Hyperlipidemia Sister     Neurologic Disorder Son         Cerebral palsy       PHYSICAL EXAMINATION  /64 (BP Location: Right arm, Patient Position: Sitting, Cuff Size: Adult Regular)   Pulse 83   Temp 97.2  F (36.2  C) (Tympanic)   Ht 1.651 m (5' 5\")   Wt 69.9 kg (154 lb 1.6 oz)   SpO2 98%   BMI 25.64 kg/m    Wt Readings from Last 2 Encounters:   09/26/24 69.9 kg (154 lb 1.6 oz)   09/26/24 68.9 kg (152 lb)     Physical Exam  Constitutional:       Appearance: Normal appearance.   Pulmonary:      Effort: Pulmonary effort is normal.   Skin:     Coloration: Skin is pale.   Neurological:      General: No focal deficit present.      Mental Status: She is alert and oriented to person, place, and time.     Laboratory and imaging:     Latest Reference Range & Units 09/23/24 09:30   WBC 4.0 - 11.0 10e3/uL 7.9   Hemoglobin 11.7 - 15.7 g/dL 9.7 (L)   Hematocrit 35.0 - 47.0 % 30.1 (L)   Platelet Count 150 - 450 10e3/uL 480 (H)   (L): Data is abnormally low  (H): Data is abnormally high    ASSESSMENT AND PLAN    1.  Iron deficiency anemia:    Found to have progressive anemia since February 2024.  She was then found to have a hemoglobin of 7.8 on 9/11/2024.  Required 1 unit PRBC.  She was also found to become progressively more iron " deficient as her ferritin went from 45 in May 24 to 8 in September 2024.  She did try oral iron in the past however was not able to tolerate it.  She is currently quite symptomatic.    Discussed recommendation for IV iron as she is intolerant to oral iron.  Risk of anaphylaxis discussed she is agreeable.  Will plan for Venofer 200 into 5 times.    She is planning to have EGD soon.  She has had a colonoscopy previously in March 2023 which showed diverticulosis.  No source of bleeding.  She also had a CT abdomen pelvis 10/7/2024 which did not show any masses or source of bleeding.    Her vitamin B12 was 378 and a normal folate.  Will also check methylmalonic acid and intrinsic antibody to rule out any concomitant vitamin B12 deficiency    Will see her back in 3 months with repeat CBCD, iron panel with ferritin and iron saturation.    Total time spent on the patient on day of encounter was 60 minutes doing chart review, review of test results, interpretation of results, patient visit and documentation.       Nicolasa Wiley MD

## 2024-09-29 LAB — IF BLOCK AB SER QL RIA: NEGATIVE

## 2024-09-30 ENCOUNTER — OFFICE VISIT (OUTPATIENT)
Dept: FAMILY MEDICINE | Facility: OTHER | Age: 64
End: 2024-09-30
Attending: STUDENT IN AN ORGANIZED HEALTH CARE EDUCATION/TRAINING PROGRAM
Payer: COMMERCIAL

## 2024-09-30 VITALS
HEIGHT: 65 IN | WEIGHT: 154.4 LBS | DIASTOLIC BLOOD PRESSURE: 70 MMHG | SYSTOLIC BLOOD PRESSURE: 120 MMHG | TEMPERATURE: 98.8 F | HEART RATE: 89 BPM | OXYGEN SATURATION: 99 % | BODY MASS INDEX: 25.72 KG/M2

## 2024-09-30 DIAGNOSIS — Z11.9 SCREENING EXAMINATION FOR INFECTIOUS DISEASE: ICD-10-CM

## 2024-09-30 DIAGNOSIS — Z00.00 HEALTHCARE MAINTENANCE: ICD-10-CM

## 2024-09-30 DIAGNOSIS — F41.9 ANXIETY: ICD-10-CM

## 2024-09-30 DIAGNOSIS — D50.0 IRON DEFICIENCY ANEMIA DUE TO CHRONIC BLOOD LOSS: ICD-10-CM

## 2024-09-30 DIAGNOSIS — E11.9 TYPE 2 DIABETES MELLITUS WITHOUT COMPLICATION, WITHOUT LONG-TERM CURRENT USE OF INSULIN (H): ICD-10-CM

## 2024-09-30 DIAGNOSIS — K21.00 GASTROESOPHAGEAL REFLUX DISEASE WITH ESOPHAGITIS WITHOUT HEMORRHAGE: ICD-10-CM

## 2024-09-30 DIAGNOSIS — Z00.00 ROUTINE GENERAL MEDICAL EXAMINATION AT A HEALTH CARE FACILITY: Primary | ICD-10-CM

## 2024-09-30 DIAGNOSIS — Z12.4 SCREENING FOR MALIGNANT NEOPLASM OF CERVIX: ICD-10-CM

## 2024-09-30 DIAGNOSIS — I10 ESSENTIAL HYPERTENSION: ICD-10-CM

## 2024-09-30 DIAGNOSIS — E78.5 HYPERLIPIDEMIA LDL GOAL <100: ICD-10-CM

## 2024-09-30 LAB
ANION GAP SERPL CALCULATED.3IONS-SCNC: 14 MMOL/L (ref 7–15)
BUN SERPL-MCNC: 30.5 MG/DL (ref 8–23)
CALCIUM SERPL-MCNC: 10 MG/DL (ref 8.8–10.4)
CHLORIDE SERPL-SCNC: 94 MMOL/L (ref 98–107)
CHOLEST SERPL-MCNC: 152 MG/DL
CREAT SERPL-MCNC: 1.16 MG/DL (ref 0.51–0.95)
EGFRCR SERPLBLD CKD-EPI 2021: 53 ML/MIN/1.73M2
EST. AVERAGE GLUCOSE BLD GHB EST-MCNC: 137 MG/DL
FASTING STATUS PATIENT QL REPORTED: YES
FASTING STATUS PATIENT QL REPORTED: YES
GLUCOSE SERPL-MCNC: 98 MG/DL (ref 70–99)
HBA1C MFR BLD: 6.4 %
HCO3 SERPL-SCNC: 24 MMOL/L (ref 22–29)
HDLC SERPL-MCNC: 75 MG/DL
LDLC SERPL CALC-MCNC: 59 MG/DL
NONHDLC SERPL-MCNC: 77 MG/DL
POTASSIUM SERPL-SCNC: 3.9 MMOL/L (ref 3.4–5.3)
SODIUM SERPL-SCNC: 132 MMOL/L (ref 135–145)
TRIGL SERPL-MCNC: 89 MG/DL

## 2024-09-30 PROCEDURE — 99213 OFFICE O/P EST LOW 20 MIN: CPT | Mod: 25 | Performed by: STUDENT IN AN ORGANIZED HEALTH CARE EDUCATION/TRAINING PROGRAM

## 2024-09-30 PROCEDURE — 36415 COLL VENOUS BLD VENIPUNCTURE: CPT | Performed by: STUDENT IN AN ORGANIZED HEALTH CARE EDUCATION/TRAINING PROGRAM

## 2024-09-30 PROCEDURE — 80061 LIPID PANEL: CPT | Performed by: STUDENT IN AN ORGANIZED HEALTH CARE EDUCATION/TRAINING PROGRAM

## 2024-09-30 PROCEDURE — 83036 HEMOGLOBIN GLYCOSYLATED A1C: CPT | Performed by: STUDENT IN AN ORGANIZED HEALTH CARE EDUCATION/TRAINING PROGRAM

## 2024-09-30 PROCEDURE — 80048 BASIC METABOLIC PNL TOTAL CA: CPT | Performed by: STUDENT IN AN ORGANIZED HEALTH CARE EDUCATION/TRAINING PROGRAM

## 2024-09-30 PROCEDURE — 87624 HPV HI-RISK TYP POOLED RSLT: CPT | Performed by: STUDENT IN AN ORGANIZED HEALTH CARE EDUCATION/TRAINING PROGRAM

## 2024-09-30 PROCEDURE — 99396 PREV VISIT EST AGE 40-64: CPT | Performed by: STUDENT IN AN ORGANIZED HEALTH CARE EDUCATION/TRAINING PROGRAM

## 2024-09-30 PROCEDURE — 86803 HEPATITIS C AB TEST: CPT | Performed by: STUDENT IN AN ORGANIZED HEALTH CARE EDUCATION/TRAINING PROGRAM

## 2024-09-30 PROCEDURE — 87389 HIV-1 AG W/HIV-1&-2 AB AG IA: CPT | Performed by: STUDENT IN AN ORGANIZED HEALTH CARE EDUCATION/TRAINING PROGRAM

## 2024-09-30 SDOH — HEALTH STABILITY: PHYSICAL HEALTH
ON AVERAGE, HOW MANY DAYS PER WEEK DO YOU ENGAGE IN MODERATE TO STRENUOUS EXERCISE (LIKE A BRISK WALK)?: PATIENT DECLINED

## 2024-09-30 ASSESSMENT — ANXIETY QUESTIONNAIRES
8. IF YOU CHECKED OFF ANY PROBLEMS, HOW DIFFICULT HAVE THESE MADE IT FOR YOU TO DO YOUR WORK, TAKE CARE OF THINGS AT HOME, OR GET ALONG WITH OTHER PEOPLE?: SOMEWHAT DIFFICULT
GAD7 TOTAL SCORE: 8
GAD7 TOTAL SCORE: 8
1. FEELING NERVOUS, ANXIOUS, OR ON EDGE: SEVERAL DAYS
3. WORRYING TOO MUCH ABOUT DIFFERENT THINGS: SEVERAL DAYS
5. BEING SO RESTLESS THAT IT IS HARD TO SIT STILL: MORE THAN HALF THE DAYS
4. TROUBLE RELAXING: MORE THAN HALF THE DAYS
6. BECOMING EASILY ANNOYED OR IRRITABLE: MORE THAN HALF THE DAYS
7. FEELING AFRAID AS IF SOMETHING AWFUL MIGHT HAPPEN: NOT AT ALL
2. NOT BEING ABLE TO STOP OR CONTROL WORRYING: NOT AT ALL
GAD7 TOTAL SCORE: 8
IF YOU CHECKED OFF ANY PROBLEMS ON THIS QUESTIONNAIRE, HOW DIFFICULT HAVE THESE PROBLEMS MADE IT FOR YOU TO DO YOUR WORK, TAKE CARE OF THINGS AT HOME, OR GET ALONG WITH OTHER PEOPLE: SOMEWHAT DIFFICULT
7. FEELING AFRAID AS IF SOMETHING AWFUL MIGHT HAPPEN: NOT AT ALL

## 2024-09-30 ASSESSMENT — PAIN SCALES - GENERAL: PAINLEVEL: MODERATE PAIN (4)

## 2024-09-30 ASSESSMENT — SOCIAL DETERMINANTS OF HEALTH (SDOH): HOW OFTEN DO YOU GET TOGETHER WITH FRIENDS OR RELATIVES?: MORE THAN THREE TIMES A WEEK

## 2024-09-30 NOTE — PROGRESS NOTES
"Preventive Care Visit  [facility]  Donaldo Seymour MD, Family Medicine  Sep 30, 2024      Assessment & Plan     Routine general medical examination at a health care facility  - Basic metabolic panel; Future  - Hemoglobin A1c; Future  - Lipid Profile (Chol, Trig, HDL, LDL calc); Future  - HIV Antigen Antibody Combo; Future  - Hepatitis C Screen Reflex to HCV RNA Quant and Genotype; Future  - Albumin Random Urine Quantitative with Creat Ratio; Future  - Preventative screening guidelines provided in AVS  - Return 1 year for annual physical    Healthcare maintenance  - BP: Well-controlled  - BMI: Reviewed.  Discussed healthy diet and excise recommendations.  Estimated body mass index is 25.69 kg/m  as calculated from the following:    Height as of this encounter: 1.651 m (5' 5\").    Weight as of this encounter: 70 kg (154 lb 6.4 oz).  - ASCVD risk screening: Updating A1c/lipid screen.  Continue ASA/statin therapy   The 10-year ASCVD risk score (Quinton ARGUETA, et al., 2019) is: 7.7%    Values used to calculate the score:      Age: 64 years      Sex: Female      Is Non- : No      Diabetic: Yes      Tobacco smoker: No      Systolic Blood Pressure: 120 mmHg      Is BP treated: Yes      HDL Cholesterol: 67 mg/dL      Total Cholesterol: 153 mg/dL  - Mood: Overall doing well      9/26/2024     1:00 PM 8/17/2021    11:00 AM   PHQ-2 ( 1999 [employer])   Q1: Little interest or pleasure in doing things 0 0   Q2: Feeling down, depressed or hopeless 0 0   PHQ-2 Score 0 0   PHQ-2 Total Score (12-17 Years)- Positive if 3 or more points; Administer PHQ-A if positive  0   - Tobacco/substance screening: No tobacco or illicit substance use     Tobacco Use      Smoking status: Former        Passive exposure: Past      Smokeless tobacco: Never  - Infectious disease screening: Low risk but will screen as below  - Cancer screening:              -Family history:   -Breast: Mammogram scheduled 12/26/2024    -Lung: CT " Considerations for Recovery After Your Procedure    Procedure specific instruction will be provided to you before and after your procedure. The following are common recommendations surgical procedures that may help you prepare for your procedure.     Showering and Bathing  You will be asked to shower and/or bathe before your procedure.  In most cases you can resume showering and bathing again the day after your procedure, however prolonged soaking of your surgical site should be avoided until dressings are removed or sites are healed.    Eating  You will be instructed to stop eating and drinking for a time prior to your procedure.  After your procedure you can typically begin eating right away.  Depending on your procedure, you may be instructed as to a special diet for a time after surgery.  In most cases you can return to a regular diet but I recommend doing this slowly.  You may find that you have decreased appetite and nausea immediately after surgery due to medications used to keep you comfortable for the procedure.  Decreased appetite in particular may linger for a few days after procedures.  I recommend having available some light foods such as Jell-O, soups, and crackers, as well as non-alcoholic, non-caffeine drinks.    Constipation    In addition to any medications you receive during your procedure, the change in routine, decreased activity and potentially change in diet associated with having a procedure may make you more prone to constipation.  I recommend taking a daily stool softener or gentle laxative for three days prior to the procedure and resuming this after your procedure until your bowel function is normal. I strongly recommend this particularly if you have a history of constipation. Docusate sodium (Colace) or polyethylene glycol (MiraLax) are common over the counter medications that can be taken as the packaging instructs to help prevent and treat constipation.  Bisacodyl (Dulcolax) or  chest 9/20/2024   -Colon: Colonoscopy 3/23/2023; 5-year follow-up   -Cervix: Pap smear 7/10/2023 with recommendation for 1 year follow-up; will update today  - Immunizations: Declines flu, COVID, Tdap, zoster, PCV    Type 2 diabetes mellitus without complication, without long-term current use of insulin (H)  Fairly controlled with lifestyle management/diet..  Update labs today.  - Hemoglobin A1c; Future  - Albumin Random Urine Quantitative with Creat Ratio; Future  - ASA/statin/ACE  - Recommend annual diabetic eye exam  - Due for annual diabetic foot exam  - Non-smoker    Hyperlipidemia LDL goal <100  Tolerating Crestor without side effect, titrate today as indicated.  - Lipid Profile (Chol, Trig, HDL, LDL calc); Future  - Crestor 20 mg daily    Gastroesophageal reflux disease with esophagitis without hemorrhage  Upcoming EGD.  We discussed risks of chronic NSAID use including her meloxicam.  - Omeprazole 40 mg daily    Essential hypertension  Well-controlled.  - Zestoretic 20-25    Iron deficiency anemia due to chronic blood loss  Following with hematology, upcoming scopes and iron transfusions.  Symptomatic but stable.    Anxiety  Doing well, a little bit overwhelmed with ongoing anemia workup but doing pretty well.  Long-term lorazepam use, has been reliable, no dose escalations, and no concerns.  - Annual UDS  - Annual controlled substance contract    Screening for malignant neoplasm of cervix  Cytology normal with positive other HPV last July.  1 year follow-up.  Pap with HPV today.  - HPV and Gynecologic Cytology Panel (Ages 30-65)    Screening examination for infectious disease  - HIV Antigen Antibody Combo; Future  - Hepatitis C Screen Reflex to HCV RNA Quant and Genotype; Future    Patient has been advised of split billing requirements and indicates understanding: Yes        Counseling  Appropriate preventive services were addressed with this patient via screening, questionnaire, or discussion as  magnesium hydroxide (Milk of Magnesia) are more aggressive over the counter remedies to take if constipation is severe.     Pain Control  The ideal pain remedy is one that controls your pain without affecting any other bodily functions.  Unfortunately, the perfect single pill for recovery does not exist, but some recovery strategies may have fewer side effects than others.  Local anesthetic and IV medications will be used at the time of your procedure to attempt to minimize immediate post op discomfort. For home care, multimodal non narcotic (non opoid) medications will also be prescribed.      After surgery I will recommend that you take acetaminophen and ibuprofen together every 6-8 hours unless you have known allergies to these medications.  Studies have shown that Acetaminophen plus Ibuprofen is equally effective as an opoid pain medication for pain control.  If you prefer to stagger these medications you may, but it is safe to take them together.     Acetaminophen (Tylenol).  Common doses of acetaminophen are 325 or 500 mg tablets.  I recommend taking two tablets every six to eight hours for the first 3-5 days after your procedure.  Please do not take more than 4000 mg of acetaminophen in a 24 hour period. Please take caution to ensure that other medications you are taking do not already contain acetaminophen, as this medication is used in other common cold medications, pain medications, and sleep medications.    Ibuprofen (Advil, Motrin).  Common doses of ibuprofen are anywhere from 200 mg to 800 mg.  I recommend taking 400 to 800 mg every six to eight hours for the first 3-5 days after your. Please do not take more than 3200 mg of ibuprofen in a 24 hour period.  Ibuprofen is in the NSAID class of medications; this class includes medications such as celebrex, voltaren, diclofenac, and mobic.  You should not take Ibuprofen if you already take other NSAIDs. You should not take ibuprofen if you have been told by  another provider to avoid this class (NSAIDs) of medications. Prolonged use of NSAIDs can cause stomach ulcers.     Gabapentin (Neurontin).  Gabapentin is a non opoid prescription medication that may be prescribed to treat pain.  Common side effects of gabapentin is drowsiness or dizziness.  Gabapentin is recommended to be taken every 8 hours.  You should decrease the dose or take the medication less often if you experience unpleasant side effects. You should not drive or operate equipment while taking this medication.    Opioids/Narcotics. Opioid pain mediation have may common side effects, including nausea, vomiting, constipation, fatigue, anxiety, and altered mental status.  Opioid use for just three days can significantly increase the risk of opioid dependence. I prefer to avoid opioid pain medication unless absolutely needed. When prescribed, it should be used for the shortest duration possible.  You should not drive, operate equipment, or make important decisions when taking opioid medication.  Commonly prescribed narcotic pain medication include hydrocodone-acetaminophen (Vicodin, Norco), acetaminophen with codeine (Tylenol #3), tramadol (Ultram), and Morphine. I will not prescribe oxycodone (OxyContin), oxycodone-acetaminophen (Percocet, Roxicet), hydromorphone (Dilaudid), or fentanyl (Sublimaze). If you are on long term opioid pain medication prescribed by another provider, you should discuss with that provider any adjustments recommended in anticipation of your procedure.      Ice  Frozen gel packs or ice packs may help with discomfort after the procedure.  I recommend obtaining these to have ready after your procedure.  Especially in the first few days, cold therapy for 20 minutes every few hours may help with comfort and to alleviate swelling.       Please note, these recommendations are provided as a generalization for most procedures and patients preparing for procedures.  The above does not take into  appropriate for fall prevention, nutrition, physical activity, Tobacco-use cessation, social engagement, weight loss and cognition.  Checklist reviewing preventive services available has been given to the patient.  Reviewed patient's diet, addressing concerns and/or questions.   The patient was instructed to see the dentist every 6 months.     Follow-up 1 year for annual physical.    Martha Calzada is a 64 year old, presenting for the following:  Physical, Hypertension, Diabetes (Needs foot exam), and Lipids        9/30/2024     2:55 PM   Additional Questions   Roomed by fred roa   Accompanied by self         9/30/2024     2:55 PM   Patient Reported Additional Medications   Patient reports taking the following new medications none        Health Care Directive  Patient does not have a Health Care Directive or Living Will: Discussed advance care planning with patient; however, patient declined at this time.    HPI    Hypertension Follow-up  Do you check your blood pressure regularly outside of the clinic? No   Are you following a low salt diet? No  Are your blood pressures ever more than 140 on the top number (systolic) OR more   than 90 on the bottom number (diastolic), for example 140/90? No    Depression and Anxiety   How are you doing with your depression since your last visit? No change  How are you doing with your anxiety since your last visit?  No change  Are you having other symptoms that might be associated with depression or anxiety? No  Have you had a significant life event? OTHER: sick of being tired all the time     Do you have any concerns with your use of alcohol or other drugs? No    Social History     Tobacco Use    Smoking status: Former     Passive exposure: Past    Smokeless tobacco: Never   Vaping Use    Vaping status: Never Used   Substance Use Topics    Alcohol use: Yes     Comment: Social    Drug use: No         7/5/2024     3:29 PM 9/11/2024     3:32 PM 9/26/2024     1:00 PM   PHQ   PHQ-9  Total Score 5 10 8   Q9: Thoughts of better off dead/self-harm past 2 weeks Not at all Not at all Not at all         5/2/2024    11:05 AM 9/11/2024     3:33 PM 9/30/2024     2:46 PM   ROBY-7 SCORE   Total Score 13 (moderate anxiety) 7 (mild anxiety) 8 (mild anxiety)   Total Score 13 7 8         9/26/2024     1:00 PM   Last PHQ-9   1.  Little interest or pleasure in doing things 0   2.  Feeling down, depressed, or hopeless 0   3.  Trouble falling or staying asleep, or sleeping too much 2   4.  Feeling tired or having little energy 3   5.  Poor appetite or overeating 1   6.  Feeling bad about yourself 0   7.  Trouble concentrating 0   8.  Moving slowly or restless 2   Q9: Thoughts of better off dead/self-harm past 2 weeks 0   PHQ-9 Total Score 8   Difficulty at work, home, or with people Somewhat difficult         9/30/2024     2:46 PM   ROBY-7    1. Feeling nervous, anxious, or on edge 1   2. Not being able to stop or control worrying 0   3. Worrying too much about different things 1   4. Trouble relaxing 2   5. Being so restless that it is hard to sit still 2   6. Becoming easily annoyed or irritable 2   7. Feeling afraid, as if something awful might happen 0   ROBY-7 Total Score 8   If you checked any problems, how difficult have they made it for you to do your work, take care of things at home, or get along with other people? Somewhat difficult       Suicide Assessment Five-step Evaluation and Treatment (SAFE-T)          9/30/2024   General Health   How would you rate your overall physical health? Good   Feel stress (tense, anxious, or unable to sleep) To some extent      (!) STRESS CONCERN      9/30/2024   Nutrition   Three or more servings of calcium each day? Yes   Diet: Regular (no restrictions)   How many servings of fruit and vegetables per day? (!) 0-1   How many sweetened beverages each day? 0-1            9/30/2024   Exercise   Days per week of moderate/strenous exercise Patient declined            9/30/2024  account your specific medical history.  Please consider your unique personal medical history, allergies, and other medications when reviewing the above recommendations. Your discharge instructions will be tailored to you.       Social Factors   Frequency of gathering with friends or relatives More than three times a week   Worry food won't last until get money to buy more No   Food not last or not have enough money for food? No   Do you have housing? (Housing is defined as stable permanent housing and does not include staying ouside in a car, in a tent, in an abandoned building, in an overnight shelter, or couch-surfing.) Yes   Are you worried about losing your housing? No   Lack of transportation? No   Unable to get utilities (heat,electricity)? No            9/30/2024   Fall Risk   Fallen 2 or more times in the past year? No   Trouble with walking or balance? No             9/30/2024   Dental   Dentist two times every year? (!) NO            9/30/2024   TB Screening   Were you born outside of the [place]? Yes            Today's PHQ-9 Score:       9/26/2024     1:00 PM   PHQ-9 SCORE   PHQ-9 Total Score 8           9/30/2024   Substance Use   Alcohol more than 3/day or more than 7/wk No   Do you use any other substances recreationally? No        Social History     Tobacco Use    Smoking status: Former     Passive exposure: Past    Smokeless tobacco: Never   Vaping Use    Vaping status: Never Used   Substance Use Topics    Alcohol use: Yes     Comment: Social    Drug use: No           7/10/2023   LAST FHS-7 RESULTS   1st degree relative breast or ovarian cancer Yes   Any male have breast cancer No   Any ONE woman have BOTH breast AND ovarian cancer Yes   Any woman with breast cancer before 50yrs No   2 or more relatives with breast AND/OR ovarian cancer No   2 or more relatives with breast AND/OR bowel cancer No           Mammogram Screening - Mammogram every 1-2 years updated in Health Maintenance based on mutual decision making          9/30/2024   One time HIV Screening   Previous HIV test? No          9/30/2024   STI Screening   New sexual partner(s) since last STI/HIV test? (!) DECLINE        History of abnormal Pap smear: No - age 30- 64  "PAP with HPV every 5 years recommended        Latest Ref Rng & Units 7/10/2023     8:58 AM   PAP / HPV   PAP  Negative for Intraepithelial Lesion or Malignancy (NILM)    HPV 16 DNA Negative Negative    HPV 18 DNA Negative Negative    Other HR HPV Negative Positive      ASCVD Risk   The 10-year ASCVD risk score (Quinton ARGUETA, et al., 2019) is: 7.7%    Values used to calculate the score:      Age: 64 years      Sex: Female      Is Non- : No      Diabetic: Yes      Tobacco smoker: No      Systolic Blood Pressure: 120 mmHg      Is BP treated: Yes      HDL Cholesterol: 67 mg/dL      Total Cholesterol: 153 mg/dL    Fracture Risk Assessment Tool  Link to Frax Calculator  Use the information below to complete the Frax calculator  : 1960  Sex: female  Weight (kg): 70 kg (actual weight)  Height (cm): 165.1 cm  Previous Fragility Fracture:  No  History of parent with fractured hip:  No  Current Smoking:  No  Patient has been on glucocorticoids for more than 3 months (5mg/day or more): No  Rheumatoid Arthritis on Problem List:  No  Secondary Osteoporosis on Problem List:  No  Consumes 3 or more units of alcohol per day: No  Femoral Neck BMD (g/cm2)           Reviewed and updated as needed this visit by Provider                    Lab work is in process      Review of Systems  Constitutional, HEENT, cardiovascular, pulmonary, gi and gu systems are negative, except as otherwise noted.     Objective    Exam  /70 (BP Location: Left arm, Patient Position: Sitting, Cuff Size: Adult Regular)   Pulse 89   Temp 98.8  F (37.1  C) (Tympanic)   Ht 1.651 m (5' 5\")   Wt 70 kg (154 lb 6.4 oz)   SpO2 99%   BMI 25.69 kg/m     Estimated body mass index is 25.69 kg/m  as calculated from the following:    Height as of this encounter: 1.651 m (5' 5\").    Weight as of this encounter: 70 kg (154 lb 6.4 oz).    Physical Exam  GENERAL: alert and no distress  EYES: Eyes grossly normal to inspection, " PERRL and conjunctivae and sclerae normal  HENT: ear canals and TM's normal, nose and mouth without ulcers or lesions  NECK: no adenopathy, no asymmetry, masses, or scars  RESP: lungs clear to auscultation - no rales, rhonchi or wheezes  BREAST: normal without masses, tenderness or nipple discharge and no palpable axillary masses or adenopathy  CV: regular rate and rhythm, normal S1 S2, no S3 or S4, no murmur, click or rub, no peripheral edema  ABDOMEN: soft, nontender, no hepatosplenomegaly, no masses and bowel sounds normal   (female): normal female external genitalia, normal urethral meatus, normal vaginal mucosa  MS: no gross musculoskeletal defects noted, no edema  SKIN: no suspicious lesions or rashes  NEURO: Normal strength and tone, mentation intact and speech normal  PSYCH: mentation appears normal, affect normal/bright    Signed Electronically by: Donaldo Seymour MD

## 2024-10-01 LAB
HCV AB SERPL QL IA: NONREACTIVE
METHYLMALONATE SERPL-SCNC: 0.63 UMOL/L (ref 0–0.4)

## 2024-10-01 RX ORDER — LANOLIN ALCOHOL/MO/W.PET/CERES
1000 CREAM (GRAM) TOPICAL DAILY
Qty: 90 TABLET | Refills: 1 | Status: SHIPPED | OUTPATIENT
Start: 2024-10-01

## 2024-10-02 LAB — HIV 1+2 AB+HIV1 P24 AG SERPL QL IA: NONREACTIVE

## 2024-10-04 LAB
HPV HR 12 DNA CVX QL NAA+PROBE: POSITIVE
HPV16 DNA CVX QL NAA+PROBE: NEGATIVE
HPV18 DNA CVX QL NAA+PROBE: NEGATIVE
HUMAN PAPILLOMA VIRUS FINAL DIAGNOSIS: ABNORMAL

## 2024-10-08 ENCOUNTER — PATIENT OUTREACH (OUTPATIENT)
Dept: ONCOLOGY | Facility: OTHER | Age: 64
End: 2024-10-08

## 2024-10-08 LAB
BKR AP ASSOCIATED HPV REPORT: NORMAL
BKR LAB AP GYN ADEQUACY: NORMAL
BKR LAB AP GYN INTERPRETATION: NORMAL
BKR LAB AP PREVIOUS ABNORMAL: NORMAL
PATH REPORT.COMMENTS IMP SPEC: NORMAL
PATH REPORT.COMMENTS IMP SPEC: NORMAL
PATH REPORT.RELEVANT HX SPEC: NORMAL

## 2024-10-15 ENCOUNTER — ANESTHESIA EVENT (OUTPATIENT)
Dept: SURGERY | Facility: HOSPITAL | Age: 64
End: 2024-10-15
Payer: COMMERCIAL

## 2024-10-15 RX ORDER — DEXAMETHASONE SODIUM PHOSPHATE 10 MG/ML
4 INJECTION, SOLUTION INTRAMUSCULAR; INTRAVENOUS
Status: CANCELLED | OUTPATIENT
Start: 2024-10-15

## 2024-10-15 RX ORDER — NALOXONE HYDROCHLORIDE 0.4 MG/ML
0.1 INJECTION, SOLUTION INTRAMUSCULAR; INTRAVENOUS; SUBCUTANEOUS
Status: CANCELLED | OUTPATIENT
Start: 2024-10-15

## 2024-10-15 RX ORDER — OXYCODONE HYDROCHLORIDE 5 MG/1
5 TABLET ORAL
Status: CANCELLED | OUTPATIENT
Start: 2024-10-15

## 2024-10-15 RX ORDER — ONDANSETRON 2 MG/ML
4 INJECTION INTRAMUSCULAR; INTRAVENOUS EVERY 30 MIN PRN
Status: CANCELLED | OUTPATIENT
Start: 2024-10-15

## 2024-10-15 RX ORDER — OXYCODONE HYDROCHLORIDE 5 MG/1
10 TABLET ORAL
Status: CANCELLED | OUTPATIENT
Start: 2024-10-15

## 2024-10-15 RX ORDER — ONDANSETRON 4 MG/1
4 TABLET, ORALLY DISINTEGRATING ORAL EVERY 30 MIN PRN
Status: CANCELLED | OUTPATIENT
Start: 2024-10-15

## 2024-10-15 ASSESSMENT — LIFESTYLE VARIABLES: TOBACCO_USE: 1

## 2024-10-15 NOTE — ANESTHESIA PREPROCEDURE EVALUATION
Anesthesia Pre-Procedure Evaluation    Patient: Elsi Krause   MRN: [id number] : 1960        Procedure : Procedure(s):  ESOPHAGOGASTRODUODENOSCOPY          Past Medical History:   Diagnosis Date     ACP (advance care planning) 2016    Advance Care Planning 2016: ACP Review of Chart / Resources Provided:  Reviewed chart for advance care plan.  Elsi Krause has no plan or code status on file. Discussed available resources and provided with information. Confirmed code status reflects current choices pending further ACP discussions.  Confirmed/documented legally designated decision makers.  Added by CASTILLO REYNA        Degenerative disc disease, lumbar      Depression      Drug overdose 2015     Hyperlipidemia      Hypertension      Impaired fasting glucose 2012     Leiomyoma of uterus, unspecified 2006    IMO Update 10/11     Osteoarthritis of ankle 2006    IMO Update 10/11     Urinary tract infection, site not specified 2002    Also 01, 04 IMO Update 10/11      Past Surgical History:   Procedure Laterality Date     CARPAL TUNNEL RELEASE RT/LT Right      CERVICAL FUSION       COLONOSCOPY N/A 3/23/2023    Procedure: COLONOSCOPY;  Surgeon: Jose Angel Fiore MD;  Location: [facility]     ENDOMETRIAL SAMPLING (BIOPSY)  2001     FOOT SURGERY Bilateral 1973     HC EXCISE CUTANEOUS NEUROMA  1997    Times 3      TUBAL LIGATION Bilateral 1989      Allergies   Allergen Reactions     Indomethacin Nausea and Vomiting      Social History     Tobacco Use     Smoking status: Former     Passive exposure: Past     Smokeless tobacco: Never   Substance Use Topics     Alcohol use: Yes     Comment: Social      Wt Readings from Last 1 Encounters:   24 70 kg (154 lb 6.4 oz)        Anesthesia Evaluation   Pt has had prior anesthetic. Type: General.        ROS/MED HX  ENT/Pulmonary:     (+)                tobacco use, Past use, 2 packs/day, 10  Pack-Year Hx,                       Neurologic:  - neg neurologic ROS     Cardiovascular: Comment: Lisinopril dose 3/22/2023    (+) Dyslipidemia hypertension- -   -  - -                                      METS/Exercise Tolerance: >4 METS    Hematologic: Comments: Received transfusion in Sept 2024, Hgb 7.8    (+)      anemia, history of blood transfusion,         Musculoskeletal: Comment: S/o cervical fusion  (+)  arthritis (gout),             GI/Hepatic: Comment: Last acid reflux episode yesteday morning but denies any symptoms today    (+) GERD, Asymptomatic on medication,                  Renal/Genitourinary:  - neg Renal ROS     Endo:  - neg endo ROS   (+)  type II DM, Last HgA1c: 6.4, date: 9-30-24, Not using insulin,                 Psychiatric/Substance Use:     (+) psychiatric history other (comment), depression and anxiety (drug overdose)       Infectious Disease:  - neg infectious disease ROS     Malignancy:  - neg malignancy ROS     Other:  - neg other ROS          Physical Exam    Airway  airway exam normal      Mallampati: II   TM distance: > 3 FB   Neck ROM: full   Mouth opening: > 3 cm    Respiratory Devices and Support         Dental       (+) Minor Abnormalities - some fillings, tiny chips      Cardiovascular   cardiovascular exam normal       Rhythm and rate: regular and normal     Pulmonary   pulmonary exam normal        breath sounds clear to auscultation       OUTSIDE LABS:  CBC:   Lab Results   Component Value Date    WBC 7.9 09/23/2024    WBC 9.3 09/20/2024    HGB 9.7 (L) 09/23/2024    HGB 9.6 (L) 09/20/2024    HCT 30.1 (L) 09/23/2024    HCT 29.6 (L) 09/20/2024     (H) 09/23/2024     09/20/2024     BMP:   Lab Results   Component Value Date     (L) 09/30/2024     09/20/2024    POTASSIUM 3.9 09/30/2024    POTASSIUM 4.3 09/20/2024    CHLORIDE 94 (L) 09/30/2024    CHLORIDE 101 09/20/2024    CO2 24 09/30/2024    CO2 22 09/20/2024    BUN 30.5 (H) 09/30/2024    BUN 35.2 (H) 09/20/2024     "CR 1.16 (H) 09/30/2024    CR 1.47 (H) 09/20/2024    GLC 98 09/30/2024     (H) 09/20/2024     COAGS: No results found for: \"PTT\", \"INR\", \"FIBR\"  POC: No results found for: \"BGM\", \"HCG\", \"HCGS\"  HEPATIC:   Lab Results   Component Value Date    ALBUMIN 4.5 09/11/2024    PROTTOTAL 7.1 09/11/2024    ALT 21 09/11/2024    AST 25 09/11/2024    ALKPHOS 96 09/11/2024    BILITOTAL 0.2 09/11/2024     OTHER:   Lab Results   Component Value Date    A1C 6.4 (H) 09/30/2024    VALERIA 10.0 09/30/2024    MAG 1.7 09/11/2024    LIPASE 34 07/12/2024    TSH 0.78 09/11/2024    CRP 4.9 01/27/2020    SED 38 (H) 01/27/2020       Anesthesia Plan    ASA Status:  3    NPO Status:  NPO Appropriate    Anesthesia Type: MAC.     - Reason for MAC: straight local not clinically adequate              Consents    Anesthesia Plan(s) and associated risks, benefits, and realistic alternatives discussed. Questions answered and patient/representative(s) expressed understanding.     - Discussed: Risks, Benefits and Alternatives for BOTH SEDATION and the PROCEDURE were discussed     - Discussed with:  Patient      - Extended Intubation/Ventilatory Support Discussed: No.      - Patient is DNR/DNI Status: No     Use of blood products discussed: No .     Postoperative Care            Comments:    Other Comments:  9/30/24  Was in ED yesterday for weakness/fatigue/sob with activity and hgb 8.1-ordered hgb re check for day of surgery  Hgb today 8.5           Nicole Reese, APRN CRNA    I have reviewed the pertinent notes and labs in the chart from the past 30 days and (re)examined the patient.  Any updates or changes from those notes are reflected in this note.               # Hypertension: Noted on problem list         # Overweight: Estimated body mass index is 25.69 kg/m  as calculated from the following:    Height as of 9/30/24: 1.651 m (5' 5\").    Weight as of 9/30/24: 70 kg (154 lb 6.4 oz).             "

## 2024-10-16 ENCOUNTER — OFFICE VISIT (OUTPATIENT)
Dept: FAMILY MEDICINE | Facility: OTHER | Age: 64
End: 2024-10-16
Attending: STUDENT IN AN ORGANIZED HEALTH CARE EDUCATION/TRAINING PROGRAM
Payer: COMMERCIAL

## 2024-10-16 VITALS
BODY MASS INDEX: 25.43 KG/M2 | WEIGHT: 152.8 LBS | DIASTOLIC BLOOD PRESSURE: 81 MMHG | TEMPERATURE: 98.1 F | HEART RATE: 83 BPM | SYSTOLIC BLOOD PRESSURE: 121 MMHG | RESPIRATION RATE: 16 BRPM | OXYGEN SATURATION: 99 %

## 2024-10-16 DIAGNOSIS — D50.0 IRON DEFICIENCY ANEMIA DUE TO CHRONIC BLOOD LOSS: Primary | ICD-10-CM

## 2024-10-16 LAB — HGB BLD-MCNC: 8.7 G/DL (ref 11.7–15.7)

## 2024-10-16 PROCEDURE — G2211 COMPLEX E/M VISIT ADD ON: HCPCS | Performed by: STUDENT IN AN ORGANIZED HEALTH CARE EDUCATION/TRAINING PROGRAM

## 2024-10-16 PROCEDURE — 36415 COLL VENOUS BLD VENIPUNCTURE: CPT | Performed by: STUDENT IN AN ORGANIZED HEALTH CARE EDUCATION/TRAINING PROGRAM

## 2024-10-16 PROCEDURE — 85018 HEMOGLOBIN: CPT | Performed by: STUDENT IN AN ORGANIZED HEALTH CARE EDUCATION/TRAINING PROGRAM

## 2024-10-16 PROCEDURE — 99213 OFFICE O/P EST LOW 20 MIN: CPT | Performed by: STUDENT IN AN ORGANIZED HEALTH CARE EDUCATION/TRAINING PROGRAM

## 2024-10-16 ASSESSMENT — PAIN SCALES - GENERAL: PAINLEVEL: MODERATE PAIN (4)

## 2024-10-16 NOTE — LETTER
October 16, 2024      Elsi Krause  [address]   [facility] [id number]        To Whom It May Concern:    Elsi Krause was seen on 10/16/24.  Please excuse her until at least 10/28/24 due to medical reasons.        Sincerely,        Donaldo Seymour MD

## 2024-10-16 NOTE — PROGRESS NOTES
Assessment & Plan     Iron deficiency anemia due to chronic blood loss  Symptomatic TIGRE sent here from employer as she continues to struggle with weakness and fatigue, no acute change in symptoms or new red flags.  No new sources of blood loss.  Has been struggling over the past 6 weeks particularly, has had ED workup including CTA chest which was negative.  Vitals normal and exam reassuring other than fatigued appearing.  Unfortunately iron infusions temporarily suspended due to national saline shortage and has not tolerated oral iron repletion.  Will check Hgb today, would repeat transfusion if Hgb <7.5.  Hopefully iron infusions can get scheduled in near future, at this point feel like she will benefit most from being taken out of work for sometime.  - Hemoglobin; Future  - EGD scheduled next week  - Work note provided  - Will try to get iron infusions rescheduled ASAP  - Reviewed S/S that warrant emergent reevaluation    I spent a total of 24 minutes on the day of the visit.   Time spent by me doing chart review, history and exam, documentation and further activities per the note    The longitudinal plan of care for the diagnosis(es)/condition(s) as documented were addressed during this visit. Due to the added complexity in care, I will continue to support Elsi in the subsequent management and with ongoing continuity of care.    Follow-up TBD.    Subjective   Elsi is a 64 year old, presenting for the following health issues:  Shortness of Breath        10/16/2024    12:44 PM   Additional Questions   Roomed by Gal Pruett   Accompanied by None         10/16/2024    12:44 PM   Patient Reported Additional Medications   Patient reports taking the following new medications None     HPI     Concern - Shortness of breathe   Onset: Chronic  Description: Patient states that she has been feeling short of breathe, very tired, headache, chills and dizziness   Intensity: moderate  Progression of Symptoms:  waxing and  waning  Accompanying Signs & Symptoms: Dizziness, shortness of breathe, dizziness, fatigue, chills and heart racing   Previous history of similar problem: Yes   Precipitating factors:        Worsened by: Walking or over exertion   Alleviating factors:        Improved by: Laying down resting   Therapies tried and outcome: Rest     -Struggling lately with symptomatic anemia  -Past recommended she be seen today, really struggling at work  -Puts him very strenuous and long shifts as a /  -Has been struggling with lightheadedness, fatigue, weakness, dyspnea over the past 6 weeks  -Reviewed a number of different clinic and ED encounters  -Was set up for iron infusions which unfortunately were canceled due to the national saline shortage; not rescheduled as of yet  -No recent change in symptoms, just gets more exhausted after long stretches of work shifts  -Historically very resistant to missing work time  -No new chest pains, palpitations, peripheral edema  -No new sources of blood loss such as hematochezia, hematic emesis, hematuria  -She is scheduled for EGD next week as part of workup as well    Review of Systems  Constitutional, HEENT, cardiovascular, pulmonary, gi and gu systems are negative, except as otherwise noted.      Objective    /81 (BP Location: Left arm, Patient Position: Sitting, Cuff Size: Adult Regular)   Pulse 83   Temp 98.1  F (36.7  C) (Tympanic)   Resp 16   Wt 69.3 kg (152 lb 12.8 oz)   SpO2 99%   BMI 25.43 kg/m    Body mass index is 25.43 kg/m .  Physical Exam  Vitals reviewed.   Constitutional:       General: She is not in acute distress.     Appearance: Normal appearance. She is not toxic-appearing.   HENT:      Head: Normocephalic and atraumatic.   Cardiovascular:      Rate and Rhythm: Normal rate and regular rhythm.      Heart sounds: Normal heart sounds.   Pulmonary:      Effort: Pulmonary effort is normal.      Breath sounds: Normal breath sounds.    Musculoskeletal:         General: Normal range of motion.      Right lower leg: No edema.      Left lower leg: No edema.   Skin:     General: Skin is warm and dry.   Neurological:      General: No focal deficit present.      Mental Status: She is alert and oriented to person, place, and time.   Psychiatric:         Mood and Affect: Mood normal.         Behavior: Behavior normal.        Signed Electronically by: Donaldo Seymour MD

## 2024-10-20 ENCOUNTER — HOSPITAL ENCOUNTER (EMERGENCY)
Facility: HOSPITAL | Age: 64
Discharge: HOME | End: 2024-10-20
Attending: NURSE PRACTITIONER | Admitting: NURSE PRACTITIONER
Payer: COMMERCIAL

## 2024-10-20 VITALS
OXYGEN SATURATION: 97 % | RESPIRATION RATE: 18 BRPM | TEMPERATURE: 99 F | BODY MASS INDEX: 25.64 KG/M2 | HEART RATE: 98 BPM | SYSTOLIC BLOOD PRESSURE: 120 MMHG | DIASTOLIC BLOOD PRESSURE: 80 MMHG | WEIGHT: 154.1 LBS

## 2024-10-20 DIAGNOSIS — D64.9 ANEMIA: ICD-10-CM

## 2024-10-20 LAB
ABO/RH(D): NORMAL
ANTIBODY SCREEN: NEGATIVE
ERYTHROCYTE [DISTWIDTH] IN BLOOD BY AUTOMATED COUNT: 17.4 % (ref 10–15)
HCT VFR BLD AUTO: 25.8 % (ref 35–47)
HGB BLD-MCNC: 8.1 G/DL (ref 11.7–15.7)
HOLD SPECIMEN: NORMAL
MCH RBC QN AUTO: 24.1 PG (ref 26.5–33)
MCHC RBC AUTO-ENTMCNC: 31.4 G/DL (ref 31.5–36.5)
MCV RBC AUTO: 77 FL (ref 78–100)
PLATELET # BLD AUTO: 373 10E3/UL (ref 150–450)
RBC # BLD AUTO: 3.36 10E6/UL (ref 3.8–5.2)
SPECIMEN EXPIRATION DATE: NORMAL
WBC # BLD AUTO: 6 10E3/UL (ref 4–11)

## 2024-10-20 PROCEDURE — 86901 BLOOD TYPING SEROLOGIC RH(D): CPT | Performed by: NURSE PRACTITIONER

## 2024-10-20 PROCEDURE — 85027 COMPLETE CBC AUTOMATED: CPT | Performed by: NURSE PRACTITIONER

## 2024-10-20 PROCEDURE — 86900 BLOOD TYPING SEROLOGIC ABO: CPT | Performed by: NURSE PRACTITIONER

## 2024-10-20 PROCEDURE — 99283 EMERGENCY DEPT VISIT LOW MDM: CPT | Performed by: NURSE PRACTITIONER

## 2024-10-20 PROCEDURE — 99283 EMERGENCY DEPT VISIT LOW MDM: CPT

## 2024-10-20 PROCEDURE — 36415 COLL VENOUS BLD VENIPUNCTURE: CPT | Performed by: NURSE PRACTITIONER

## 2024-10-20 ASSESSMENT — COLUMBIA-SUICIDE SEVERITY RATING SCALE - C-SSRS
6. HAVE YOU EVER DONE ANYTHING, STARTED TO DO ANYTHING, OR PREPARED TO DO ANYTHING TO END YOUR LIFE?: NO
1. IN THE PAST MONTH, HAVE YOU WISHED YOU WERE DEAD OR WISHED YOU COULD GO TO SLEEP AND NOT WAKE UP?: NO
2. HAVE YOU ACTUALLY HAD ANY THOUGHTS OF KILLING YOURSELF IN THE PAST MONTH?: NO

## 2024-10-20 ASSESSMENT — ENCOUNTER SYMPTOMS
ALLERGIC/IMMUNOLOGIC NEGATIVE: 1
SHORTNESS OF BREATH: 1
MUSCULOSKELETAL NEGATIVE: 1
EYES NEGATIVE: 1
PSYCHIATRIC NEGATIVE: 1
CARDIOVASCULAR NEGATIVE: 1
GASTROINTESTINAL NEGATIVE: 1
HEMATOLOGIC/LYMPHATIC NEGATIVE: 1
ENDOCRINE NEGATIVE: 1
FATIGUE: 1
NEUROLOGICAL NEGATIVE: 1
CHILLS: 1

## 2024-10-20 ASSESSMENT — ACTIVITIES OF DAILY LIVING (ADL): ADLS_ACUITY_SCORE: 39

## 2024-10-20 NOTE — DISCHARGE INSTRUCTIONS
Follow-up with your primary care provider for reevaluation.  Contact your primary care provider if you have any questions or concerns.  Do not hesitate to return to the ER if any new or worsening symptoms.     Please read the attached instructions (if any).  They highlight more specific treatments and interventions for you at home.              Thank you for letting me participate in your care and wish you a fast and uneventful recovery,    Filiberto ONTIVEROS CNP    Do not hesitate to contact me with questions or concerns.  [email or web address]

## 2024-10-20 NOTE — ED TRIAGE NOTES
Pt reports she had a low hgb on Wednesday. Pt states she feeling worse and would like to have a repeat HGB because she's having a procedure done on Tuesday. Pt reports EGD scheduled to find out where she's bleeding from.

## 2024-10-20 NOTE — ED PROVIDER NOTES
History     Chief Complaint   Patient presents with    Generalized Weakness    Abnormal Labs     HPI  Elsi Krause is a 64 year old individual with history of hypertension, GERD, major depressive disorder, anxiety, DMT2, TIGRE, comes in for complaints of generalized weakness and concerns of low hemoglobin.  Patient states that she has TIGRE from chronic blood loss but does not know where she is losing blood.  Is scheduled for EGD on 10/22/2024 but comes in because she is feeling short of breath with activity and fatigue.  No fever reported.  No blood in stools reported.  No vomiting reported.    Allergies:  Allergies   Allergen Reactions    Indomethacin Nausea and Vomiting       Problem List:    Patient Active Problem List    Diagnosis Date Noted    Iron deficiency anemia due to chronic blood loss 09/26/2024     Priority: Medium    Anemia 09/12/2024     Priority: Medium    Diabetes mellitus, type 2 (H) 10/02/2023     Priority: Medium    Hx of gout 03/13/2023     Priority: Medium    Gastroesophageal reflux disease with esophagitis without hemorrhage 03/13/2023     Priority: Medium    Hyperlipidemia LDL goal <100 03/13/2023     Priority: Medium    Recurrent major depressive disorder, in partial remission (H) 03/13/2023     Priority: Medium    Anxiety 03/13/2023     Priority: Medium    Status post cervical spinal fusion 09/28/2017     Priority: Medium    Drug overdose, multiple drugs 04/20/2015     Priority: Medium    Cervical radicular pain 08/27/2014     Priority: Medium    Osteoarthritis of both feet 12/28/2011     Priority: Medium    Essential hypertension 02/07/2003     Priority: Medium     IMO Update          Past Medical History:    Past Medical History:   Diagnosis Date    ACP (advance care planning) 07/19/2016    Degenerative disc disease, lumbar     Depression     Drug overdose 04/20/2015    Hyperlipidemia     Hypertension     Impaired fasting glucose 04/04/2012    Leiomyoma of uterus, unspecified 09/19/2006     Osteoarthritis of ankle 08/22/2006    Urinary tract infection, site not specified 07/22/2002       Past Surgical History:    Past Surgical History:   Procedure Laterality Date    CARPAL TUNNEL RELEASE RT/LT Right     CERVICAL FUSION      COLONOSCOPY N/A 3/23/2023    Procedure: COLONOSCOPY;  Surgeon: Jose Angel Fiore MD;  Location: [facility]    ENDOMETRIAL SAMPLING (BIOPSY)  12/27/2001    FOOT SURGERY Bilateral 01/01/1973    HC EXCISE CUTANEOUS NEUROMA  11/05/1997    Times 3     TUBAL LIGATION Bilateral 04/01/1989       Family History:    Family History   Problem Relation Age of Onset    Diabetes Mother     Hypertension Mother     Hyperlipidemia Mother     Diabetes Father     Coronary Artery Disease Father     Hypertension Father     Hyperlipidemia Father     Chronic Obstructive Pulmonary Disease Father     Cancer - colorectal Father     Prostate Cancer Paternal Grandfather     Diabetes Sister     Hypertension Sister     Hyperlipidemia Sister     Neurologic Disorder Son         Cerebral palsy       Social History:  Marital Status:  Single [1]  Social History     Tobacco Use    Smoking status: Former     Passive exposure: Past    Smokeless tobacco: Never   Vaping Use    Vaping status: Never Used   Substance Use Topics    Alcohol use: Yes     Comment: Social    Drug use: No        Medications:    allopurinol (ZYLOPRIM) 300 MG tablet  Aspirin 325 MG CAPS  colchicine (MITIGARE) 0.6 MG capsule  cyanocobalamin (VITAMIN B-12) 1000 MCG tablet  cyclobenzaprine (FLEXERIL) 10 MG tablet  ferrous sulfate (FEROSUL) 325 (65 Fe) MG tablet  lisinopril-hydrochlorothiazide (ZESTORETIC) 20-25 MG tablet  LORazepam (ATIVAN) 0.5 MG tablet  meloxicam (MOBIC) 15 MG tablet  omeprazole (PRILOSEC) 40 MG DR capsule  rosuvastatin (CRESTOR) 20 MG tablet          Review of Systems   Constitutional:  Positive for chills and fatigue.   HENT: Negative.     Eyes: Negative.    Respiratory:  Positive for shortness of breath.    Cardiovascular: Negative.     Gastrointestinal: Negative.    Endocrine: Negative.    Genitourinary: Negative.    Musculoskeletal: Negative.    Skin: Negative.    Allergic/Immunologic: Negative.    Neurological: Negative.    Hematological: Negative.    Psychiatric/Behavioral: Negative.         Physical Exam   BP: 120/80  Pulse: 98  Temp: 99  F (37.2  C)  Resp: 18  Weight: 69.9 kg (154 lb 1.6 oz)  SpO2: 97 %      GENERAL APPEARANCE:  The patient is a 64 year old well-developed, well-nourished individual that appears as stated age.  LUNGS:  Breathing is easy.  Breath sounds are equal and clear bilaterally.  No wheezes, rhonchi, or rales.  HEART:  Regular rate and rhythm with normal S1 and S2.  No murmurs, gallops, or rubs.  ABDOMEN:  Soft.  No mass, tenderness, guarding, or rebound.  No organomegaly or hernia.  Bowel sounds are present.  No CVA tenderness or flank mass.  No abdominal bruits or thrills present upon auscultation/palpation.  NEUROLOGIC:  No focal sensory or motor deficits are noted.   PSYCHIATRIC:  The patient is awake, alert, and oriented x4.  Recent and remote memory is intact.  Appropriate mood and affect.  Calm and cooperative with history and physical exam.  SKIN:  Warm, dry, and well perfused.  Good turgor.  No lesions, nodules, or rashes are noted.  No bruising noted.       ED Course     ED Course as of 10/20/24 1707   Sun Oct 20, 2024   1623 In to see patient and history/physical completed.    1657 Hemoglobin(!): 8.1  Hemoglobin 8.1.   1705 Patient does not fit transfusion criteria.  Abs vital signs normal we will discharge home for follow-up and schedule EGD.  Return precautions given.               Results for orders placed or performed during the hospital encounter of 10/20/24 (from the past 24 hour(s))   CBC with platelets   Result Value Ref Range    WBC Count 6.0 4.0 - 11.0 10e3/uL    RBC Count 3.36 (L) 3.80 - 5.20 10e6/uL    Hemoglobin 8.1 (L) 11.7 - 15.7 g/dL    Hematocrit 25.8 (L) 35.0 - 47.0 %    MCV 77 (L) 78 - 100  fL    MCH 24.1 (L) 26.5 - 33.0 pg    MCHC 31.4 (L) 31.5 - 36.5 g/dL    RDW 17.4 (H) 10.0 - 15.0 %    Platelet Count 373 150 - 450 10e3/uL   ABO/Rh type and screen    Narrative    The following orders were created for panel order ABO/Rh type and screen.  Procedure                               Abnormality         Status                     ---------                               -----------         ------                     Adult Type and Screen[[id number]]                            In process                   Please view results for these tests on the individual orders.   Extra Tube    Narrative    The following orders were created for panel order Extra Tube.  Procedure                               Abnormality         Status                     ---------                               -----------         ------                     Extra Blue Top Tube[[id number]]                              In process                 Extra Red Top Tube[[id number]]                               In process                 Extra Green Top (Lithium...[[id number]]                      In process                 Extra Heparinized Syringe[[id number]]                        In process                   Please view results for these tests on the individual orders.       Medications - No data to display    Assessments & Plan (with Medical Decision Making)     I have reviewed the nursing notes.    I have reviewed the findings, diagnosis, plan and need for follow up with the patient.    Summary:  Patient presents to the ER today for fatigue and shortness of breath and concerns of low hemoglobin.  Potential diagnosis which have been considered and evaluated include anemia,, viral illness, as well as others. Many of these have been excluded using the various modalities and assessment as noted on the chart. At the present time, the diagnosis given seems to be the most likely anemia from chronic blood loss.  Upon arrival, vitals signs are normal.   The patient is alert and oriented.  Cardiac and respiratory examination normal.  No petechiae present.  No abdominal pain tenderness.  Lab work was obtained showing WBC of 6.0 with hemoglobin of 8.1 and platelet count of 373.  4 days prior hemoglobin was 8.7.  At this time patient does not meet transfusion criteria.  Will discharge home to continue follow-up with PCP and schedule EGD.  Return to ER for new or worsening symptoms or concerns.  Patient verbalized understanding agrees with plan of care.  Patient discharged home.        Critical Care Time: None     Impression and plan discussed with patient. Questions answered, concerns addressed, indications for urgent re-evaluation reviewed, and  given. Patient/Parent/Caregiver agree with treatment plan and have no further questions at this time.  AVS provided at discharge.    This note was created by the Dragon Voice Dictation System. Inadvertent typographical errors, due to software recognition problems, may still exist.             New Prescriptions    No medications on file       Final diagnoses:   Anemia       10/20/2024   [facility]       Filiberto Domínguez APRN CNP  10/20/24 1700

## 2024-10-22 ENCOUNTER — HOSPITAL ENCOUNTER (OUTPATIENT)
Facility: HOSPITAL | Age: 64
Discharge: HOME | End: 2024-10-22
Attending: STUDENT IN AN ORGANIZED HEALTH CARE EDUCATION/TRAINING PROGRAM | Admitting: STUDENT IN AN ORGANIZED HEALTH CARE EDUCATION/TRAINING PROGRAM
Payer: COMMERCIAL

## 2024-10-22 ENCOUNTER — LAB (OUTPATIENT)
Dept: LAB | Facility: HOSPITAL | Age: 64
End: 2024-10-22
Attending: STUDENT IN AN ORGANIZED HEALTH CARE EDUCATION/TRAINING PROGRAM
Payer: COMMERCIAL

## 2024-10-22 ENCOUNTER — ANESTHESIA (OUTPATIENT)
Dept: SURGERY | Facility: HOSPITAL | Age: 64
End: 2024-10-22
Payer: COMMERCIAL

## 2024-10-22 VITALS
DIASTOLIC BLOOD PRESSURE: 67 MMHG | OXYGEN SATURATION: 97 % | SYSTOLIC BLOOD PRESSURE: 98 MMHG | RESPIRATION RATE: 16 BRPM | TEMPERATURE: 98.7 F | HEART RATE: 89 BPM

## 2024-10-22 LAB
HGB BLD-MCNC: 8.5 G/DL (ref 11.7–15.7)
HOLD SPECIMEN: NORMAL
HOLD SPECIMEN: NORMAL

## 2024-10-22 PROCEDURE — 85018 HEMOGLOBIN: CPT | Performed by: NURSE ANESTHETIST, CERTIFIED REGISTERED

## 2024-10-22 PROCEDURE — 250N000011 HC RX IP 250 OP 636: Performed by: NURSE ANESTHETIST, CERTIFIED REGISTERED

## 2024-10-22 PROCEDURE — 370N000017 HC ANESTHESIA TECHNICAL FEE, PER MIN: Performed by: STUDENT IN AN ORGANIZED HEALTH CARE EDUCATION/TRAINING PROGRAM

## 2024-10-22 PROCEDURE — 360N000075 HC SURGERY LEVEL 2, PER MIN: Performed by: STUDENT IN AN ORGANIZED HEALTH CARE EDUCATION/TRAINING PROGRAM

## 2024-10-22 PROCEDURE — 258N000003 HC RX IP 258 OP 636: Performed by: NURSE ANESTHETIST, CERTIFIED REGISTERED

## 2024-10-22 PROCEDURE — 88305 TISSUE EXAM BY PATHOLOGIST: CPT | Mod: 26 | Performed by: PATHOLOGY

## 2024-10-22 PROCEDURE — 43239 EGD BIOPSY SINGLE/MULTIPLE: CPT | Performed by: NURSE ANESTHETIST, CERTIFIED REGISTERED

## 2024-10-22 PROCEDURE — 999N000141 HC STATISTIC PRE-PROCEDURE NURSING ASSESSMENT: Performed by: STUDENT IN AN ORGANIZED HEALTH CARE EDUCATION/TRAINING PROGRAM

## 2024-10-22 PROCEDURE — 36415 COLL VENOUS BLD VENIPUNCTURE: CPT | Performed by: NURSE ANESTHETIST, CERTIFIED REGISTERED

## 2024-10-22 PROCEDURE — 710N000012 HC RECOVERY PHASE 2, PER MINUTE: Performed by: STUDENT IN AN ORGANIZED HEALTH CARE EDUCATION/TRAINING PROGRAM

## 2024-10-22 PROCEDURE — 43239 EGD BIOPSY SINGLE/MULTIPLE: CPT | Performed by: STUDENT IN AN ORGANIZED HEALTH CARE EDUCATION/TRAINING PROGRAM

## 2024-10-22 PROCEDURE — 88305 TISSUE EXAM BY PATHOLOGIST: CPT | Mod: TC | Performed by: STUDENT IN AN ORGANIZED HEALTH CARE EDUCATION/TRAINING PROGRAM

## 2024-10-22 PROCEDURE — 250N000009 HC RX 250: Performed by: NURSE ANESTHETIST, CERTIFIED REGISTERED

## 2024-10-22 PROCEDURE — 272N000001 HC OR GENERAL SUPPLY STERILE: Performed by: STUDENT IN AN ORGANIZED HEALTH CARE EDUCATION/TRAINING PROGRAM

## 2024-10-22 RX ORDER — LIDOCAINE 40 MG/G
CREAM TOPICAL
Status: DISCONTINUED | OUTPATIENT
Start: 2024-10-22 | End: 2024-10-22 | Stop reason: HOSPADM

## 2024-10-22 RX ORDER — SODIUM CHLORIDE, SODIUM LACTATE, POTASSIUM CHLORIDE, CALCIUM CHLORIDE 600; 310; 30; 20 MG/100ML; MG/100ML; MG/100ML; MG/100ML
INJECTION, SOLUTION INTRAVENOUS CONTINUOUS
Status: DISCONTINUED | OUTPATIENT
Start: 2024-10-22 | End: 2024-10-22 | Stop reason: HOSPADM

## 2024-10-22 RX ORDER — PROPOFOL 10 MG/ML
INJECTION, EMULSION INTRAVENOUS PRN
Status: DISCONTINUED | OUTPATIENT
Start: 2024-10-22 | End: 2024-10-22

## 2024-10-22 RX ORDER — DEXMEDETOMIDINE HYDROCHLORIDE 4 UG/ML
INJECTION, SOLUTION INTRAVENOUS PRN
Status: DISCONTINUED | OUTPATIENT
Start: 2024-10-22 | End: 2024-10-22

## 2024-10-22 RX ADMIN — SODIUM CHLORIDE, POTASSIUM CHLORIDE, SODIUM LACTATE AND CALCIUM CHLORIDE: 600; 310; 30; 20 INJECTION, SOLUTION INTRAVENOUS at 08:43

## 2024-10-22 RX ADMIN — DEXMEDETOMIDINE HYDROCHLORIDE 8 MCG: 4 INJECTION, SOLUTION INTRAVENOUS at 11:25

## 2024-10-22 RX ADMIN — PROPOFOL 50 MG: 10 INJECTION, EMULSION INTRAVENOUS at 11:23

## 2024-10-22 RX ADMIN — DEXMEDETOMIDINE HYDROCHLORIDE 8 MCG: 4 INJECTION, SOLUTION INTRAVENOUS at 11:23

## 2024-10-22 RX ADMIN — SODIUM CHLORIDE, POTASSIUM CHLORIDE, SODIUM LACTATE AND CALCIUM CHLORIDE: 600; 310; 30; 20 INJECTION, SOLUTION INTRAVENOUS at 11:22

## 2024-10-22 RX ADMIN — PROPOFOL 70 MG: 10 INJECTION, EMULSION INTRAVENOUS at 11:24

## 2024-10-22 RX ADMIN — PROPOFOL 30 MG: 10 INJECTION, EMULSION INTRAVENOUS at 11:27

## 2024-10-22 ASSESSMENT — ACTIVITIES OF DAILY LIVING (ADL)
ADLS_ACUITY_SCORE: 37

## 2024-10-22 NOTE — H&P
[facility] Pre-Op H&P    CHIEF COMPLAINT:  No chief complaint on file.      HISTORY OF PRESENT ILLNESS:  Elsi Krause is a 64 year old female who is today for EGD. Prior history of iron deficiency anemia and chronic meloxicam use    REVIEW OF SYSTEMS:  10 point review of symptoms completed and negative unless otherwise listed in HPI    Past Medical History:   Diagnosis Date    ACP (advance care planning) 07/19/2016    Advance Care Planning 7/19/2016: ACP Review of Chart / Resources Provided:  Reviewed chart for advance care plan.  Elsi Krause has no plan or code status on file. Discussed available resources and provided with information. Confirmed code status reflects current choices pending further ACP discussions.  Confirmed/documented legally designated decision makers.  Added by CASTILLO REYNA       Degenerative disc disease, lumbar     Depression     Drug overdose 04/20/2015    Hyperlipidemia     Hypertension     Impaired fasting glucose 04/04/2012    Leiomyoma of uterus, unspecified 09/19/2006    IMO Update 10/11    Osteoarthritis of ankle 08/22/2006    IMO Update 10/11    Urinary tract infection, site not specified 07/22/2002    Also 9/11/01, 11/18/04 IMO Update 10/11       Past Surgical History:   Procedure Laterality Date    CARPAL TUNNEL RELEASE RT/LT Right     CERVICAL FUSION      COLONOSCOPY N/A 3/23/2023    Procedure: COLONOSCOPY;  Surgeon: Jose Angel Fiore MD;  Location: [facility]    ENDOMETRIAL SAMPLING (BIOPSY)  12/27/2001    FOOT SURGERY Bilateral 01/01/1973    HC EXCISE CUTANEOUS NEUROMA  11/05/1997    Times 3     TUBAL LIGATION Bilateral 04/01/1989       Family History   Problem Relation Age of Onset    Diabetes Mother     Hypertension Mother     Hyperlipidemia Mother     Diabetes Father     Coronary Artery Disease Father     Hypertension Father     Hyperlipidemia Father     Chronic Obstructive Pulmonary Disease Father     Cancer - colorectal Father     Prostate Cancer Paternal Grandfather      Diabetes Sister     Hypertension Sister     Hyperlipidemia Sister     Neurologic Disorder Son         Cerebral palsy       Social History     Tobacco Use    Smoking status: Former     Passive exposure: Past    Smokeless tobacco: Never   Substance Use Topics    Alcohol use: Yes     Comment: Social       Patient Active Problem List   Diagnosis    Drug overdose, multiple drugs    Osteoarthritis of both feet    Cervical radicular pain    Essential hypertension    Status post cervical spinal fusion    Hx of gout    Gastroesophageal reflux disease with esophagitis without hemorrhage    Hyperlipidemia LDL goal <100    Recurrent major depressive disorder, in partial remission (H)    Anxiety    Diabetes mellitus, type 2 (H)    Anemia    Iron deficiency anemia due to chronic blood loss       Allergies   Allergen Reactions    Indomethacin Nausea and Vomiting       Current Outpatient Medications   Medication Sig Dispense Refill    allopurinol (ZYLOPRIM) 300 MG tablet Take 1 tablet by mouth once daily 90 tablet 3    colchicine (MITIGARE) 0.6 MG capsule Take 0.6 mg by mouth daily.      cyclobenzaprine (FLEXERIL) 10 MG tablet Take 1 tablet (10 mg) by mouth 3 times daily as needed for muscle spasms 90 tablet 0    ferrous sulfate (FEROSUL) 325 (65 Fe) MG tablet Take 1 tablet (325 mg) by mouth Every Mon, Wed, Fri Morning 30 tablet 1    lisinopril-hydrochlorothiazide (ZESTORETIC) 20-25 MG tablet Take 1 tablet by mouth once daily 90 tablet 3    LORazepam (ATIVAN) 0.5 MG tablet Take 1 tablet (0.5 mg) by mouth nightly as needed for anxiety. 28 tablet 0    omeprazole (PRILOSEC) 40 MG DR capsule Take 1 capsule by mouth once daily 90 capsule 3    rosuvastatin (CRESTOR) 20 MG tablet Take 1 tablet by mouth once daily 90 tablet 0    Aspirin 325 MG CAPS Take 325 mg by mouth daily (Patient not taking: Reported on 10/16/2024)      cyanocobalamin (VITAMIN B-12) 1000 MCG tablet Take 1 tablet (1,000 mcg) by mouth daily. (Patient not taking: Reported  on 10/16/2024) 90 tablet 1       Vitals: BP 98/67   Pulse 89   Temp 98.7  F (37.1  C) (Tympanic)   Resp 16   SpO2 97%   BMI= There is no height or weight on file to calculate BMI.    EXAM:  General: Vital signs reviewed, in no apparent distress  Eyes: Anicteric  HENT: Normocephalic, atraumatic, trachea midline   Respiratory: Breathing nonlabored, no wheezing, rhonchi or rales on bilateral anterior ausculation  Cardiovascular: Regular rate and rhythm, no murmurs, rubs or gallops  GI: Abdomen soft, nondistended, nontender, no organomegaly  Musculoskeletal: No gross deformities  Neurologic: Grossly nonfocal exam  Psychiatric: Normal mood, affect and insight  Integumentary: Warm and dry    ASSESSMENT:  Elsi Krause is a 64 year old who presents for  EGD.       PLAN:  Proceed with  colonoscopy EGD.            Paddy Hernandez MD

## 2024-10-22 NOTE — OR NURSING
Patient and responsible adult given discharge instructions with no questions regarding instructions. Juliet score 19/20. Pain level 0/10.  Discharged from unit via ambulatory with daughter. Patient discharged to home, copy of AVS sent and no questions offered prior to discharge.

## 2024-10-22 NOTE — ANESTHESIA POSTPROCEDURE EVALUATION
Patient: Elsi Krause    Procedure: Procedure(s):  ESOPHAGOGASTRODUODENOSCOPY with biopsy       Anesthesia Type:  MAC    Note:  Disposition: Outpatient   Postop Pain Control: Uneventful            Sign Out: Well controlled pain   PONV: No   Neuro/Psych: Uneventful            Sign Out: Acceptable/Baseline neuro status   Airway/Respiratory: Uneventful            Sign Out: Acceptable/Baseline resp. status   CV/Hemodynamics: Uneventful            Sign Out: Acceptable CV status; No obvious hypovolemia; No obvious fluid overload   Other NRE: NONE   DID A NON-ROUTINE EVENT OCCUR? No         Last vitals:  Vitals Value Taken Time   BP 92/65 10/22/24 1206   Temp 98  F (36.7  C) 10/22/24 1136   Pulse 81 10/22/24 1206   Resp 16 10/22/24 1200   SpO2 97 % 10/22/24 1215   Vitals shown include unfiled device data.    Electronically Signed By: RAMA Osborn CRNA  October 22, 2024  12:17 PM

## 2024-10-22 NOTE — DISCHARGE INSTRUCTIONS
Upper GI Endoscopy: What to Expect at Home  Your Recovery  You had an upper GI endoscopy. Your doctor used a thin, lighted tube that bends to look at the inside of your esophagus, your stomach, and the first part of the small intestine, called the duodenum.  After you have an endoscopy, you will stay at the hospital or clinic for 1 to 2 hours. This will allow the medicine to wear off. You will be able to go home after your doctor or nurse checks to make sure that you're not having any problems.  You may have to stay overnight if you had treatment during the test. You may have a sore throat for a day or two after the test.  This care sheet gives you a general idea about what to expect after the test.  How can you care for yourself at home?  Activity   Rest as much as you need to after you go home.  You should be able to go back to your usual activities the day after the test.  Diet   Follow your doctor's directions for eating after the test.  Drink plenty of fluids (unless your doctor has told you not to).  Medications   If you have a sore throat the day after the test, use an over-the-counter spray to numb your throat.  Follow-up care is a key part of your treatment and safety. Be sure to make and go to all appointments, and call your doctor if you are having problems. It's also a good idea to know your test results and keep a list of the medicines you take.  When should you call for help?   Call 911 anytime you think you may need emergency care. For example, call if:    You passed out (lost consciousness).     You have trouble breathing.     You pass maroon or bloody stools.   Call your doctor now or seek immediate medical care if:    You have pain that does not get better after your take pain medicine.     You have new or worse belly pain.     You have blood in your stools.     You are sick to your stomach and cannot keep fluids down.     You have a fever.     You cannot pass stools or gas.   Watch closely for  "changes in your health, and be sure to contact your doctor if:    Your throat still hurts after a day or two.     You do not get better as expected.   Where can you learn more?  Go to https://www.[employer].net/patiented  Enter J454 in the search box to learn more about \"Upper GI Endoscopy: What to Expect at Home.\"  Current as of: October 19, 2023  Content Version: 14.2 2024 [employer].   Care instructions adapted under license by your healthcare professional. If you have questions about a medical condition or this instruction, always ask your healthcare professional. [employer] disclaims any warranty or liability for your use of this information.  After Anesthesia (Sleep Medicine)  What should I do after anesthesia?  You should rest and relax for the next 24 hours. Avoid risky or difficult (strenuous) activity. A responsible adult should stay with you overnight.  Don't drive or use any heavy equipment for 24 hours. Even if you feel normal, your reactions may be affected by the sleep medicine given to you.  Don't drink alcohol or make any important decisions for 24 hours.  Slowly get back to your regular diet, as you feel able.  How should I expect to feel?  It's normal to feel dizzy, light-headed, or faint for up to a full day after anesthesia or while taking pain medicine. If this happens:   Sit down for a few minutes before standing.  Have someone help you when you get up to walk or use the bathroom.  If you have nausea (feel sick to your stomach) or vomit (throw up):   Drink clear liquids (such as apple juice, ginger ale, broth, or 7UP) until you feel better.  If you feel sick to your stomach, or you keep vomiting for 24 hours, please call the doctor.  What else should I know?  You might have a dry mouth, sore throat, muscle aches, or trouble sleeping. These should go away after 24 hours.  Please contact your doctor if you have any other symptoms that concern you, such as fever, " pain, bleeding, fluid drainage, swelling, or headache, or if it's been over 8 to 10 hours and you still aren't able to pee (urinate).  If you have a history of sleep apnea, it's very important to use your CPAP machine for the next 24 hours when you nap or sleep.   For informational purposes only. Not to replace the advice of your health care provider. Copyright   2023 [facility]. All rights reserved. Clinically reviewed by Adithya Segura MD. [employer] [id number] - REV 09/23.

## 2024-10-22 NOTE — OP NOTE
Elsi Krause MRN# [id number]   YOB: 1960 Age: 64 year old      Date of Admission:  10/22/2024    Primary care provider: Donaldo Seymour    PREOPERATIVE DIAGNOSIS:  Anemia      POSTOPERATIVE DIAGNOSES:    Normal EGD      PROCEDURE:  Esophagogastroduodenoscopy with cold forceps biopsies.       HISTORY OF PRESENT ILLNESS:  This 64 year old female developed anemia of unknown etiology. She has been taking Mobic daily for years for chronic pain. Due to the concern for gastric ulcer, EGD was recommended.      OPERATIVE FINDINGS:  The gastroesophageal junction was noted 34cm from the incisors.  The Z line was without changes suggesting reflux.  There was no evidence of ulceration or stricture.  The were were no in the  stomach and/or duodenum; specifically, no evidence of gastritis or ulcers.    Specimen(s) submitted to pathology:  Antral biopsies    PROCEDURE:  With the patient in the supine position on the transport cart, IV sedation was administered by the nurse anesthetist.  The patient's correct identity and planned procedure were confirmed during a requisite timeout pause.  A bite block was placed and the fiberoptic endoscope was introduced and negotiated through the cricopharyngeus without difficulty.  The length of the esophagus was examined without findings.  The gastroesophageal junction was noted 34 cm from the incisors; the Z line was regular without ulceration, bleeding source or stricture.  There was no  evidence of Pineda's change.  The stomach was entered and the pylorus was traversed easily.  The gastric mucosa was normal; there was no evidence of gastric polyp, ulcer or bleeding source.  The duodenum was similarly without finding.  The endoscope was returned to the body of the stomach and retroflex confirmed the absence of abnormality in the cardia.      Random cold forceps biopsies were obtained of the antrum for H. pylori.  Hemostasis was judged adequate on visualization.   The endoscope  was returned to the GE junction.  A second circumferential examination of the esophagus was performed on slow withdrawal of the endoscope without additional finding.  The procedure was satisfactorially tolerated; there was no appreciable blood loss and the patient was returned to Day Surgery in good condition.      Paddy Hernandez MD, MS   General Surgeon  [facility]

## 2024-10-22 NOTE — ANESTHESIA CARE TRANSFER NOTE
Patient: Elsi Krause    Procedure: Procedure(s):  ESOPHAGOGASTRODUODENOSCOPY with biopsy       Diagnosis: Anemia [D64.9]  Diagnosis Additional Information: No value filed.    Anesthesia Type:   MAC     Note:    Oropharynx: oropharynx clear of all foreign objects  Level of Consciousness: drowsy  Oxygen Supplementation: room air    Independent Airway: airway patency satisfactory and stable  Dentition: dentition unchanged  Vital Signs Stable: post-procedure vital signs reviewed and stable  Report to RN Given: handoff report given  Patient transferred to: Phase II    Handoff Report: Identifed the Patient, Identified the Reponsible Provider, Reviewed the pertinent medical history, Discussed the surgical course, Reviewed Intra-OP anesthesia mangement and issues during anesthesia, Set expectations for post-procedure period and Allowed opportunity for questions and acknowledgement of understanding      Vitals:  Vitals Value Taken Time   BP  10/22/24 1136   Temp     Pulse 78 10/22/24 1136   Resp     SpO2 95 % 10/22/24 1137   Vitals shown include unfiled device data.    Electronically Signed By: RAMA Li CRNA  October 22, 2024  11:38 AM

## 2024-10-23 ENCOUNTER — INFUSION THERAPY VISIT (OUTPATIENT)
Dept: INFUSION THERAPY | Facility: OTHER | Age: 64
End: 2024-10-23
Attending: INTERNAL MEDICINE
Payer: COMMERCIAL

## 2024-10-23 VITALS
HEART RATE: 80 BPM | BODY MASS INDEX: 26.01 KG/M2 | TEMPERATURE: 97.2 F | DIASTOLIC BLOOD PRESSURE: 76 MMHG | SYSTOLIC BLOOD PRESSURE: 118 MMHG | WEIGHT: 156.09 LBS | RESPIRATION RATE: 16 BRPM | OXYGEN SATURATION: 98 % | HEIGHT: 65 IN

## 2024-10-23 DIAGNOSIS — D50.0 IRON DEFICIENCY ANEMIA DUE TO CHRONIC BLOOD LOSS: Primary | ICD-10-CM

## 2024-10-23 PROCEDURE — 96374 THER/PROPH/DIAG INJ IV PUSH: CPT | Performed by: INTERNAL MEDICINE

## 2024-10-23 RX ORDER — DIPHENHYDRAMINE HYDROCHLORIDE 50 MG/ML
50 INJECTION INTRAMUSCULAR; INTRAVENOUS
Status: CANCELLED
Start: 2024-10-25

## 2024-10-23 RX ORDER — ALBUTEROL SULFATE 90 UG/1
1-2 INHALANT RESPIRATORY (INHALATION)
Status: CANCELLED
Start: 2024-10-25

## 2024-10-23 RX ORDER — HEPARIN SODIUM (PORCINE) LOCK FLUSH IV SOLN 100 UNIT/ML 100 UNIT/ML
5 SOLUTION INTRAVENOUS
Status: CANCELLED | OUTPATIENT
Start: 2024-10-25

## 2024-10-23 RX ORDER — EPINEPHRINE 1 MG/ML
0.3 INJECTION, SOLUTION, CONCENTRATE INTRAVENOUS EVERY 5 MIN PRN
Status: CANCELLED | OUTPATIENT
Start: 2024-10-25

## 2024-10-23 RX ORDER — ALBUTEROL SULFATE 0.83 MG/ML
2.5 SOLUTION RESPIRATORY (INHALATION)
Status: CANCELLED | OUTPATIENT
Start: 2024-10-25

## 2024-10-23 RX ORDER — METHYLPREDNISOLONE SODIUM SUCCINATE 125 MG/2ML
125 INJECTION INTRAMUSCULAR; INTRAVENOUS
Status: CANCELLED
Start: 2024-10-25

## 2024-10-23 RX ORDER — MEPERIDINE HYDROCHLORIDE 25 MG/ML
25 INJECTION INTRAMUSCULAR; INTRAVENOUS; SUBCUTANEOUS EVERY 30 MIN PRN
Status: CANCELLED | OUTPATIENT
Start: 2024-10-25

## 2024-10-23 RX ORDER — HEPARIN SODIUM,PORCINE 10 UNIT/ML
5-20 VIAL (ML) INTRAVENOUS DAILY PRN
Status: CANCELLED | OUTPATIENT
Start: 2024-10-25

## 2024-10-23 RX ADMIN — Medication 500 ML: at 09:08

## 2024-10-23 ASSESSMENT — PAIN SCALES - GENERAL: PAINLEVEL_OUTOF10: NO PAIN (0)

## 2024-10-23 NOTE — PROGRESS NOTES
Infusion Nursing Note:  Elsi Krause presents today for Venofer.    Patient seen by provider today: No   present during visit today: Not Applicable.    Note: N/A.      Treatment Conditions:  Not Applicable.      Post Infusion Assessment:  Patient tolerated infusion without incident.  Patient observed for 15 minutes post infusion per protocol.    IV removal and discharge delegated to Cecelia HENSON

## 2024-10-23 NOTE — PATIENT INSTRUCTIONS

## 2024-10-24 DIAGNOSIS — M25.512 CHRONIC PAIN OF BOTH SHOULDERS: ICD-10-CM

## 2024-10-24 DIAGNOSIS — M25.511 CHRONIC PAIN OF BOTH SHOULDERS: ICD-10-CM

## 2024-10-24 DIAGNOSIS — G89.29 CHRONIC PAIN OF BOTH SHOULDERS: ICD-10-CM

## 2024-10-24 DIAGNOSIS — R10.9 ABDOMINAL CRAMPING: ICD-10-CM

## 2024-10-24 LAB
PATH REPORT.COMMENTS IMP SPEC: NORMAL
PATH REPORT.FINAL DX SPEC: NORMAL
PATH REPORT.GROSS SPEC: NORMAL
PATH REPORT.MICROSCOPIC SPEC OTHER STN: NORMAL
PATH REPORT.RELEVANT HX SPEC: NORMAL
PHOTO IMAGE: NORMAL

## 2024-10-24 RX ORDER — CYCLOBENZAPRINE HCL 10 MG
10 TABLET ORAL 3 TIMES DAILY PRN
Qty: 90 TABLET | Refills: 0 | Status: SHIPPED | OUTPATIENT
Start: 2024-10-24

## 2024-10-24 NOTE — TELEPHONE ENCOUNTER
cyclobenzaprine (FLEXERIL) 10 MG tablet         Last Written Prescription Date:  7/24/24  Last Fill Quantity: 90,   # refills: 0  Last Office Visit: 10/16/24  Future Office visit:    Next 5 appointments (look out 90 days)      Nov 14, 2024 10:00 AM  (Arrive by 9:45 AM)  Office Visit with Jorge Andino MD  [facility] ([facility] ) [address]  [facility] [address]  [phone]     Dec 26, 2024 10:30 AM  (Arrive by 10:15 AM)  Return Visit with Nicolasa Wiley MD  [facility] ([facility] ) [address]  [facility] [address]  [phone]     Dec 30, 2024 9:30 AM  (Arrive by 9:15 AM)  Provider Visit with Donaldo Seymour MD  [facility] ([facility] ) [address]  [facility] [address]  [phone]             Routing refill request to provider for review/approval because:  Drug not on the [facility], [facility] or [facility] refill protocol or controlled substance

## 2024-10-25 ENCOUNTER — INFUSION THERAPY VISIT (OUTPATIENT)
Dept: INFUSION THERAPY | Facility: OTHER | Age: 64
End: 2024-10-25
Attending: INTERNAL MEDICINE
Payer: COMMERCIAL

## 2024-10-25 VITALS
OXYGEN SATURATION: 98 % | RESPIRATION RATE: 16 BRPM | DIASTOLIC BLOOD PRESSURE: 71 MMHG | SYSTOLIC BLOOD PRESSURE: 129 MMHG | TEMPERATURE: 97.4 F | HEART RATE: 84 BPM

## 2024-10-25 DIAGNOSIS — D50.0 IRON DEFICIENCY ANEMIA DUE TO CHRONIC BLOOD LOSS: Primary | ICD-10-CM

## 2024-10-25 PROCEDURE — 96374 THER/PROPH/DIAG INJ IV PUSH: CPT | Performed by: INTERNAL MEDICINE

## 2024-10-25 RX ORDER — METHYLPREDNISOLONE SODIUM SUCCINATE 125 MG/2ML
125 INJECTION INTRAMUSCULAR; INTRAVENOUS
Status: CANCELLED
Start: 2024-10-27

## 2024-10-25 RX ORDER — ALBUTEROL SULFATE 90 UG/1
1-2 INHALANT RESPIRATORY (INHALATION)
Status: CANCELLED
Start: 2024-10-27

## 2024-10-25 RX ORDER — MEPERIDINE HYDROCHLORIDE 25 MG/ML
25 INJECTION INTRAMUSCULAR; INTRAVENOUS; SUBCUTANEOUS EVERY 30 MIN PRN
Status: CANCELLED | OUTPATIENT
Start: 2024-10-27

## 2024-10-25 RX ORDER — EPINEPHRINE 1 MG/ML
0.3 INJECTION, SOLUTION, CONCENTRATE INTRAVENOUS EVERY 5 MIN PRN
Status: CANCELLED | OUTPATIENT
Start: 2024-10-27

## 2024-10-25 RX ORDER — DIPHENHYDRAMINE HYDROCHLORIDE 50 MG/ML
50 INJECTION INTRAMUSCULAR; INTRAVENOUS
Status: CANCELLED
Start: 2024-10-27

## 2024-10-25 RX ORDER — ALBUTEROL SULFATE 0.83 MG/ML
2.5 SOLUTION RESPIRATORY (INHALATION)
Status: CANCELLED | OUTPATIENT
Start: 2024-10-27

## 2024-10-25 RX ORDER — HEPARIN SODIUM (PORCINE) LOCK FLUSH IV SOLN 100 UNIT/ML 100 UNIT/ML
5 SOLUTION INTRAVENOUS
Status: CANCELLED | OUTPATIENT
Start: 2024-10-27

## 2024-10-25 RX ORDER — HEPARIN SODIUM,PORCINE 10 UNIT/ML
5-20 VIAL (ML) INTRAVENOUS DAILY PRN
Status: CANCELLED | OUTPATIENT
Start: 2024-10-27

## 2024-10-25 RX ADMIN — Medication 250 ML: at 08:35

## 2024-10-25 ASSESSMENT — PAIN SCALES - GENERAL: PAINLEVEL: NO PAIN (0)

## 2024-10-25 NOTE — PROGRESS NOTES
Infusion Nursing Note:  Elsi Krause presents today for Venofer.    Patient seen by provider today: No   present during visit today: Not Applicable.    Note: N/A.      Intravenous Access:  Peripheral IV placed.    Treatment Conditions:  Not Applicable.      Post Infusion Assessment:  Patient tolerated infusion without incident.  Patient observed for 15 minutes post infusion per protocol.    PIV, BP/Pulse, and subsequent discharge delegated to Cecelia HENSON.

## 2024-10-25 NOTE — PATIENT INSTRUCTIONS
We will see you back as planned. If you have any questions or concerns, we can be reached Monday through Friday 8am - 430pm at [phone] (PAC). If you have concerns related to a potential reaction/side effect after hours/weekends/holiday's, please seek emergent medical care.

## 2024-10-28 ENCOUNTER — INFUSION THERAPY VISIT (OUTPATIENT)
Dept: INFUSION THERAPY | Facility: OTHER | Age: 64
End: 2024-10-28
Attending: INTERNAL MEDICINE
Payer: COMMERCIAL

## 2024-10-28 VITALS — HEART RATE: 60 BPM | DIASTOLIC BLOOD PRESSURE: 68 MMHG | SYSTOLIC BLOOD PRESSURE: 101 MMHG

## 2024-10-28 DIAGNOSIS — D50.0 IRON DEFICIENCY ANEMIA DUE TO CHRONIC BLOOD LOSS: Primary | ICD-10-CM

## 2024-10-28 PROCEDURE — 96374 THER/PROPH/DIAG INJ IV PUSH: CPT | Performed by: INTERNAL MEDICINE

## 2024-10-28 RX ORDER — MEPERIDINE HYDROCHLORIDE 25 MG/ML
25 INJECTION INTRAMUSCULAR; INTRAVENOUS; SUBCUTANEOUS EVERY 30 MIN PRN
Status: CANCELLED | OUTPATIENT
Start: 2024-10-29

## 2024-10-28 RX ORDER — METHYLPREDNISOLONE SODIUM SUCCINATE 125 MG/2ML
125 INJECTION INTRAMUSCULAR; INTRAVENOUS
Status: CANCELLED
Start: 2024-10-29

## 2024-10-28 RX ORDER — HEPARIN SODIUM (PORCINE) LOCK FLUSH IV SOLN 100 UNIT/ML 100 UNIT/ML
5 SOLUTION INTRAVENOUS
Status: CANCELLED | OUTPATIENT
Start: 2024-10-29

## 2024-10-28 RX ORDER — ALBUTEROL SULFATE 90 UG/1
1-2 INHALANT RESPIRATORY (INHALATION)
Status: CANCELLED
Start: 2024-10-29

## 2024-10-28 RX ORDER — DIPHENHYDRAMINE HYDROCHLORIDE 50 MG/ML
50 INJECTION INTRAMUSCULAR; INTRAVENOUS
Status: CANCELLED
Start: 2024-10-29

## 2024-10-28 RX ORDER — ALBUTEROL SULFATE 0.83 MG/ML
2.5 SOLUTION RESPIRATORY (INHALATION)
Status: CANCELLED | OUTPATIENT
Start: 2024-10-29

## 2024-10-28 RX ORDER — EPINEPHRINE 1 MG/ML
0.3 INJECTION, SOLUTION, CONCENTRATE INTRAVENOUS EVERY 5 MIN PRN
Status: CANCELLED | OUTPATIENT
Start: 2024-10-29

## 2024-10-28 RX ORDER — HEPARIN SODIUM,PORCINE 10 UNIT/ML
5-20 VIAL (ML) INTRAVENOUS DAILY PRN
Status: CANCELLED | OUTPATIENT
Start: 2024-10-29

## 2024-10-28 RX ADMIN — Medication 250 ML: at 08:19

## 2024-10-28 NOTE — PROGRESS NOTES
Infusion Nursing Note:  Elsi Krause presents today for venefor.    Patient seen by provider today: No   present during visit today: Not Applicable.    Note: N/A.      Intravenous Access:  Peripheral IV placed.    Treatment Conditions:  Not Applicable.      Post Infusion Assessment:  Patient tolerated infusion without incident.  Patient observed for 15 minutes post venefor per protocol.  Blood return noted pre and post infusion.  Site patent and intact, free from redness, edema or discomfort.  No evidence of extravasations.  Access discontinued per protocol.       Discharge Plan:   Patient and/or family verbalized understanding of discharge instructions and all questions answered.      DONY PAREKH

## 2024-10-29 ENCOUNTER — MYC MEDICAL ADVICE (OUTPATIENT)
Dept: FAMILY MEDICINE | Facility: OTHER | Age: 64
End: 2024-10-29

## 2024-10-29 DIAGNOSIS — G89.29 CHRONIC PAIN OF BOTH SHOULDERS: Primary | ICD-10-CM

## 2024-10-29 DIAGNOSIS — M25.512 CHRONIC PAIN OF BOTH SHOULDERS: Primary | ICD-10-CM

## 2024-10-29 DIAGNOSIS — M25.511 CHRONIC PAIN OF BOTH SHOULDERS: Primary | ICD-10-CM

## 2024-10-30 ENCOUNTER — INFUSION THERAPY VISIT (OUTPATIENT)
Dept: INFUSION THERAPY | Facility: OTHER | Age: 64
End: 2024-10-30
Attending: INTERNAL MEDICINE
Payer: COMMERCIAL

## 2024-10-30 VITALS
BODY MASS INDEX: 25.96 KG/M2 | DIASTOLIC BLOOD PRESSURE: 62 MMHG | SYSTOLIC BLOOD PRESSURE: 103 MMHG | TEMPERATURE: 97.3 F | WEIGHT: 155.8 LBS | HEART RATE: 78 BPM | RESPIRATION RATE: 18 BRPM | HEIGHT: 65 IN | OXYGEN SATURATION: 94 %

## 2024-10-30 DIAGNOSIS — D50.0 IRON DEFICIENCY ANEMIA DUE TO CHRONIC BLOOD LOSS: Primary | ICD-10-CM

## 2024-10-30 PROCEDURE — 96374 THER/PROPH/DIAG INJ IV PUSH: CPT | Performed by: INTERNAL MEDICINE

## 2024-10-30 RX ORDER — DIPHENHYDRAMINE HYDROCHLORIDE 50 MG/ML
50 INJECTION INTRAMUSCULAR; INTRAVENOUS
Status: CANCELLED
Start: 2024-11-01

## 2024-10-30 RX ORDER — METHYLPREDNISOLONE SODIUM SUCCINATE 125 MG/2ML
125 INJECTION INTRAMUSCULAR; INTRAVENOUS
Status: CANCELLED
Start: 2024-11-01

## 2024-10-30 RX ORDER — EPINEPHRINE 1 MG/ML
0.3 INJECTION, SOLUTION, CONCENTRATE INTRAVENOUS EVERY 5 MIN PRN
Status: CANCELLED | OUTPATIENT
Start: 2024-11-01

## 2024-10-30 RX ORDER — ALBUTEROL SULFATE 90 UG/1
1-2 INHALANT RESPIRATORY (INHALATION)
Status: CANCELLED
Start: 2024-11-01

## 2024-10-30 RX ORDER — ALBUTEROL SULFATE 0.83 MG/ML
2.5 SOLUTION RESPIRATORY (INHALATION)
Status: CANCELLED | OUTPATIENT
Start: 2024-11-01

## 2024-10-30 RX ORDER — MEPERIDINE HYDROCHLORIDE 25 MG/ML
25 INJECTION INTRAMUSCULAR; INTRAVENOUS; SUBCUTANEOUS EVERY 30 MIN PRN
Status: CANCELLED | OUTPATIENT
Start: 2024-11-01

## 2024-10-30 RX ORDER — HEPARIN SODIUM (PORCINE) LOCK FLUSH IV SOLN 100 UNIT/ML 100 UNIT/ML
5 SOLUTION INTRAVENOUS
Status: CANCELLED | OUTPATIENT
Start: 2024-11-01

## 2024-10-30 RX ORDER — HEPARIN SODIUM,PORCINE 10 UNIT/ML
5-20 VIAL (ML) INTRAVENOUS DAILY PRN
Status: CANCELLED | OUTPATIENT
Start: 2024-11-01

## 2024-10-30 RX ADMIN — Medication 500 ML: at 09:59

## 2024-10-30 ASSESSMENT — PAIN SCALES - GENERAL: PAINLEVEL_OUTOF10: NO PAIN (0)

## 2024-10-30 NOTE — PROGRESS NOTES
Infusion Nursing Note:  Elsi Krause presents today for Venofer.    Patient seen by provider today: No   present during visit today: Not Applicable.    Note: N/A.    Treatment Conditions:  Not Applicable.      Post Infusion Assessment:  Patient tolerated infusion without incident.  Patient observed for 15 minutes post infusion per protocol.    Patient tolerated monitoring period well. PIV removal and subsequent discharge delegated to Cecelia HENSON.

## 2024-10-30 NOTE — PATIENT INSTRUCTIONS
We will see you back as planned. If you have any questions or concerns, we can be reached Monday through Friday 8am - 430pm at [phone] (PAC). If you have concerns related to a potential reaction/side effect after hours/weekends/holiday's, please seek emergent medical care.

## 2024-11-01 ENCOUNTER — TELEPHONE (OUTPATIENT)
Dept: INFUSION THERAPY | Facility: OTHER | Age: 64
End: 2024-11-01

## 2024-11-01 NOTE — PROGRESS NOTES
0704: Call to patient, message left, asking her not to come for scheduled appointment today. Apologized for the inconvenience and alerted someone would reach out to her as soon as possible to reschedule. Message out to PAC.

## 2024-11-04 RX ORDER — GABAPENTIN 100 MG/1
100 CAPSULE ORAL 3 TIMES DAILY
Qty: 90 CAPSULE | Refills: 0 | Status: SHIPPED | OUTPATIENT
Start: 2024-11-04

## 2024-11-07 ENCOUNTER — INFUSION THERAPY VISIT (OUTPATIENT)
Dept: INFUSION THERAPY | Facility: OTHER | Age: 64
End: 2024-11-07
Attending: INTERNAL MEDICINE
Payer: COMMERCIAL

## 2024-11-07 ENCOUNTER — MYC MEDICAL ADVICE (OUTPATIENT)
Dept: FAMILY MEDICINE | Facility: OTHER | Age: 64
End: 2024-11-07

## 2024-11-07 VITALS
DIASTOLIC BLOOD PRESSURE: 66 MMHG | OXYGEN SATURATION: 97 % | SYSTOLIC BLOOD PRESSURE: 108 MMHG | RESPIRATION RATE: 18 BRPM | TEMPERATURE: 97.4 F | HEART RATE: 80 BPM

## 2024-11-07 DIAGNOSIS — M25.511 CHRONIC PAIN OF BOTH SHOULDERS: Primary | ICD-10-CM

## 2024-11-07 DIAGNOSIS — G89.29 CHRONIC PAIN OF BOTH SHOULDERS: Primary | ICD-10-CM

## 2024-11-07 DIAGNOSIS — M25.512 CHRONIC PAIN OF BOTH SHOULDERS: Primary | ICD-10-CM

## 2024-11-07 DIAGNOSIS — D50.0 IRON DEFICIENCY ANEMIA DUE TO CHRONIC BLOOD LOSS: Primary | ICD-10-CM

## 2024-11-07 PROCEDURE — 96374 THER/PROPH/DIAG INJ IV PUSH: CPT | Performed by: INTERNAL MEDICINE

## 2024-11-07 RX ORDER — DIPHENHYDRAMINE HYDROCHLORIDE 50 MG/ML
50 INJECTION INTRAMUSCULAR; INTRAVENOUS
Status: CANCELLED
Start: 2024-11-07

## 2024-11-07 RX ORDER — ALBUTEROL SULFATE 0.83 MG/ML
2.5 SOLUTION RESPIRATORY (INHALATION)
Status: CANCELLED | OUTPATIENT
Start: 2024-11-07

## 2024-11-07 RX ORDER — HEPARIN SODIUM,PORCINE 10 UNIT/ML
5-20 VIAL (ML) INTRAVENOUS DAILY PRN
Status: CANCELLED | OUTPATIENT
Start: 2024-11-07

## 2024-11-07 RX ORDER — MEPERIDINE HYDROCHLORIDE 25 MG/ML
25 INJECTION INTRAMUSCULAR; INTRAVENOUS; SUBCUTANEOUS EVERY 30 MIN PRN
Status: CANCELLED | OUTPATIENT
Start: 2024-11-07

## 2024-11-07 RX ORDER — EPINEPHRINE 1 MG/ML
0.3 INJECTION, SOLUTION, CONCENTRATE INTRAVENOUS EVERY 5 MIN PRN
Status: CANCELLED | OUTPATIENT
Start: 2024-11-07

## 2024-11-07 RX ORDER — METHYLPREDNISOLONE SODIUM SUCCINATE 125 MG/2ML
125 INJECTION INTRAMUSCULAR; INTRAVENOUS
Status: CANCELLED
Start: 2024-11-07

## 2024-11-07 RX ORDER — GABAPENTIN 300 MG/1
300 CAPSULE ORAL 3 TIMES DAILY
Qty: 90 CAPSULE | Refills: 0 | Status: SHIPPED | OUTPATIENT
Start: 2024-11-07

## 2024-11-07 RX ORDER — HEPARIN SODIUM (PORCINE) LOCK FLUSH IV SOLN 100 UNIT/ML 100 UNIT/ML
5 SOLUTION INTRAVENOUS
Status: CANCELLED | OUTPATIENT
Start: 2024-11-07

## 2024-11-07 RX ORDER — ALBUTEROL SULFATE 90 UG/1
1-2 INHALANT RESPIRATORY (INHALATION)
Status: CANCELLED
Start: 2024-11-07

## 2024-11-07 RX ADMIN — Medication 500 ML: at 08:24

## 2024-11-07 NOTE — TELEPHONE ENCOUNTER
gabapentin (NEURONTIN) 100 MG capsule 90 capsule 0 11/4/2024 -- No   Sig - Route: Take 1 capsule (100 mg) by mouth 3 times daily. - Oral   Sent to pharmacy as: Gabapentin 100 MG Oral Capsule (NEURONTIN)   Class: E-Prescribe   Order: [id number]   E-Prescribing Status: Receipt confirmed by pharmacy (11/4/2024  4:14 PM CST)     Does patient need a stronger dose?

## 2024-11-07 NOTE — PROGRESS NOTES
Infusion Nursing Note:  Elsi Krause presents today for Venofer.    Patient seen by provider today: No   present during visit today: Not Applicable.    Note: N/A.    Intravenous Access:  Peripheral IV placed.    Treatment Conditions:  Not Applicable.    Post Infusion Assessment:  Patient tolerated infusion without incident.  Patient observed for 15 minutes post Venofer infusion  protocol.  Site patent and intact, free from redness, edema or discomfort.  No evidence of extravasations.  Access discontinued per protocol.     Discharge Plan:   Patient discharged in stable condition accompanied by: self.  Departure Mode: Ambulatory.

## 2024-11-09 ENCOUNTER — HOSPITAL ENCOUNTER (EMERGENCY)
Facility: HOSPITAL | Age: 64
Discharge: HOME | End: 2024-11-09
Attending: PHYSICIAN ASSISTANT
Payer: COMMERCIAL

## 2024-11-09 ENCOUNTER — APPOINTMENT (OUTPATIENT)
Dept: CT IMAGING | Facility: HOSPITAL | Age: 64
End: 2024-11-09
Attending: PHYSICIAN ASSISTANT
Payer: COMMERCIAL

## 2024-11-09 VITALS
SYSTOLIC BLOOD PRESSURE: 117 MMHG | RESPIRATION RATE: 20 BRPM | HEART RATE: 85 BPM | DIASTOLIC BLOOD PRESSURE: 68 MMHG | TEMPERATURE: 100.5 F | OXYGEN SATURATION: 95 %

## 2024-11-09 DIAGNOSIS — M54.6 ACUTE MIDLINE THORACIC BACK PAIN: Primary | ICD-10-CM

## 2024-11-09 DIAGNOSIS — N39.0 UTI (URINARY TRACT INFECTION): ICD-10-CM

## 2024-11-09 DIAGNOSIS — R50.9 FEVER: ICD-10-CM

## 2024-11-09 LAB
ALBUMIN UR-MCNC: NEGATIVE MG/DL
ANION GAP SERPL CALCULATED.3IONS-SCNC: 15 MMOL/L (ref 7–15)
APPEARANCE UR: CLEAR
BACTERIA #/AREA URNS HPF: ABNORMAL /HPF
BASOPHILS # BLD AUTO: 0 10E3/UL (ref 0–0.2)
BASOPHILS NFR BLD AUTO: 0 %
BILIRUB UR QL STRIP: NEGATIVE
BUN SERPL-MCNC: 16.4 MG/DL (ref 8–23)
CALCIUM SERPL-MCNC: 10.2 MG/DL (ref 8.8–10.4)
CHLORIDE SERPL-SCNC: 95 MMOL/L (ref 98–107)
COLOR UR AUTO: ABNORMAL
CREAT SERPL-MCNC: 1.03 MG/DL (ref 0.51–0.95)
CRP SERPL-MCNC: 72.13 MG/L
EGFRCR SERPLBLD CKD-EPI 2021: 61 ML/MIN/1.73M2
EOSINOPHIL # BLD AUTO: 0 10E3/UL (ref 0–0.7)
EOSINOPHIL NFR BLD AUTO: 1 %
ERYTHROCYTE [DISTWIDTH] IN BLOOD BY AUTOMATED COUNT: 22.1 % (ref 10–15)
ERYTHROCYTE [SEDIMENTATION RATE] IN BLOOD BY WESTERGREN METHOD: 78 MM/HR (ref 0–30)
FLUAV RNA SPEC QL NAA+PROBE: NEGATIVE
FLUBV RNA RESP QL NAA+PROBE: NEGATIVE
GLUCOSE SERPL-MCNC: 134 MG/DL (ref 70–99)
GLUCOSE UR STRIP-MCNC: NEGATIVE MG/DL
HCO3 SERPL-SCNC: 23 MMOL/L (ref 22–29)
HCT VFR BLD AUTO: 31.9 % (ref 35–47)
HGB BLD-MCNC: 10 G/DL (ref 11.7–15.7)
HGB UR QL STRIP: NEGATIVE
IMM GRANULOCYTES # BLD: 0 10E3/UL
IMM GRANULOCYTES NFR BLD: 0 %
KETONES UR STRIP-MCNC: NEGATIVE MG/DL
LACTATE SERPL-SCNC: 0.7 MMOL/L (ref 0.7–2)
LEUKOCYTE ESTERASE UR QL STRIP: ABNORMAL
LYMPHOCYTES # BLD AUTO: 1.1 10E3/UL (ref 0.8–5.3)
LYMPHOCYTES NFR BLD AUTO: 14 %
MCH RBC QN AUTO: 25.4 PG (ref 26.5–33)
MCHC RBC AUTO-ENTMCNC: 31.3 G/DL (ref 31.5–36.5)
MCV RBC AUTO: 81 FL (ref 78–100)
MONOCYTES # BLD AUTO: 0.6 10E3/UL (ref 0–1.3)
MONOCYTES NFR BLD AUTO: 8 %
MUCOUS THREADS #/AREA URNS LPF: PRESENT /LPF
NEUTROPHILS # BLD AUTO: 5.9 10E3/UL (ref 1.6–8.3)
NEUTROPHILS NFR BLD AUTO: 77 %
NITRATE UR QL: POSITIVE
NRBC # BLD AUTO: 0 10E3/UL
NRBC BLD AUTO-RTO: 0 /100
PH UR STRIP: 5.5 [PH] (ref 4.7–8)
PLATELET # BLD AUTO: 354 10E3/UL (ref 150–450)
POTASSIUM SERPL-SCNC: 3.8 MMOL/L (ref 3.4–5.3)
PROCALCITONIN SERPL IA-MCNC: 0.09 NG/ML
RBC # BLD AUTO: 3.94 10E6/UL (ref 3.8–5.2)
RBC URINE: 1 /HPF
RSV RNA SPEC NAA+PROBE: NEGATIVE
SARS-COV-2 RNA RESP QL NAA+PROBE: NEGATIVE
SODIUM SERPL-SCNC: 133 MMOL/L (ref 135–145)
SP GR UR STRIP: 1.03 (ref 1–1.03)
SQUAMOUS EPITHELIAL: 5 /HPF
UROBILINOGEN UR STRIP-MCNC: NORMAL MG/DL
WBC # BLD AUTO: 7.7 10E3/UL (ref 4–11)
WBC URINE: 28 /HPF

## 2024-11-09 PROCEDURE — 85652 RBC SED RATE AUTOMATED: CPT | Performed by: PHYSICIAN ASSISTANT

## 2024-11-09 PROCEDURE — 250N000013 HC RX MED GY IP 250 OP 250 PS 637: Performed by: PHYSICIAN ASSISTANT

## 2024-11-09 PROCEDURE — 36415 COLL VENOUS BLD VENIPUNCTURE: CPT | Performed by: PHYSICIAN ASSISTANT

## 2024-11-09 PROCEDURE — 250N000011 HC RX IP 250 OP 636: Performed by: PHYSICIAN ASSISTANT

## 2024-11-09 PROCEDURE — 72129 CT CHEST SPINE W/DYE: CPT

## 2024-11-09 PROCEDURE — 85041 AUTOMATED RBC COUNT: CPT | Performed by: PHYSICIAN ASSISTANT

## 2024-11-09 PROCEDURE — 85004 AUTOMATED DIFF WBC COUNT: CPT | Performed by: PHYSICIAN ASSISTANT

## 2024-11-09 PROCEDURE — 86140 C-REACTIVE PROTEIN: CPT | Performed by: PHYSICIAN ASSISTANT

## 2024-11-09 PROCEDURE — 80048 BASIC METABOLIC PNL TOTAL CA: CPT | Performed by: PHYSICIAN ASSISTANT

## 2024-11-09 PROCEDURE — 99285 EMERGENCY DEPT VISIT HI MDM: CPT | Mod: 25

## 2024-11-09 PROCEDURE — 87086 URINE CULTURE/COLONY COUNT: CPT | Performed by: PHYSICIAN ASSISTANT

## 2024-11-09 PROCEDURE — 87040 BLOOD CULTURE FOR BACTERIA: CPT | Performed by: PHYSICIAN ASSISTANT

## 2024-11-09 PROCEDURE — 81003 URINALYSIS AUTO W/O SCOPE: CPT | Performed by: PHYSICIAN ASSISTANT

## 2024-11-09 PROCEDURE — 96375 TX/PRO/DX INJ NEW DRUG ADDON: CPT | Mod: XU

## 2024-11-09 PROCEDURE — 83605 ASSAY OF LACTIC ACID: CPT | Performed by: PHYSICIAN ASSISTANT

## 2024-11-09 PROCEDURE — 84145 PROCALCITONIN (PCT): CPT | Performed by: PHYSICIAN ASSISTANT

## 2024-11-09 PROCEDURE — 96365 THER/PROPH/DIAG IV INF INIT: CPT

## 2024-11-09 PROCEDURE — 87637 SARSCOV2&INF A&B&RSV AMP PRB: CPT | Performed by: PHYSICIAN ASSISTANT

## 2024-11-09 RX ORDER — OXYCODONE AND ACETAMINOPHEN 5; 325 MG/1; MG/1
1 TABLET ORAL EVERY 6 HOURS PRN
Qty: 12 TABLET | Refills: 0 | Status: SHIPPED | OUTPATIENT
Start: 2024-11-09 | End: 2024-11-12

## 2024-11-09 RX ORDER — CEPHALEXIN 500 MG/1
500 CAPSULE ORAL 4 TIMES DAILY
Qty: 28 CAPSULE | Refills: 0 | Status: SHIPPED | OUTPATIENT
Start: 2024-11-09 | End: 2024-11-16

## 2024-11-09 RX ORDER — KETOROLAC TROMETHAMINE 15 MG/ML
15 INJECTION, SOLUTION INTRAMUSCULAR; INTRAVENOUS ONCE
Status: COMPLETED | OUTPATIENT
Start: 2024-11-09 | End: 2024-11-09

## 2024-11-09 RX ORDER — IOPAMIDOL 755 MG/ML
75 INJECTION, SOLUTION INTRAVASCULAR ONCE
Status: COMPLETED | OUTPATIENT
Start: 2024-11-09 | End: 2024-11-09

## 2024-11-09 RX ORDER — LIDOCAINE 4 G/G
1 PATCH TOPICAL ONCE
Status: DISCONTINUED | OUTPATIENT
Start: 2024-11-09 | End: 2024-11-09 | Stop reason: HOSPADM

## 2024-11-09 RX ORDER — CEFTRIAXONE SODIUM 1 G/50ML
1 INJECTION, SOLUTION INTRAVENOUS ONCE
Status: COMPLETED | OUTPATIENT
Start: 2024-11-09 | End: 2024-11-09

## 2024-11-09 RX ORDER — LIDOCAINE 4 G/G
1 PATCH TOPICAL EVERY 24 HOURS
Qty: 10 PATCH | Refills: 0 | Status: SHIPPED | OUTPATIENT
Start: 2024-11-09 | End: 2024-11-19

## 2024-11-09 RX ORDER — DIAZEPAM 10 MG/2ML
2.5 INJECTION, SOLUTION INTRAMUSCULAR; INTRAVENOUS ONCE
Status: COMPLETED | OUTPATIENT
Start: 2024-11-09 | End: 2024-11-09

## 2024-11-09 RX ADMIN — CEFTRIAXONE SODIUM 1 G: 1 INJECTION, SOLUTION INTRAVENOUS at 16:45

## 2024-11-09 RX ADMIN — IOPAMIDOL 75 ML: 755 INJECTION, SOLUTION INTRAVENOUS at 14:20

## 2024-11-09 RX ADMIN — KETOROLAC TROMETHAMINE 15 MG: 15 INJECTION, SOLUTION INTRAMUSCULAR; INTRAVENOUS at 13:31

## 2024-11-09 RX ADMIN — DIAZEPAM 2.5 MG: 10 INJECTION, SOLUTION INTRAMUSCULAR; INTRAVENOUS at 13:31

## 2024-11-09 RX ADMIN — LIDOCAINE 1 PATCH: 4 PATCH TOPICAL at 16:46

## 2024-11-09 ASSESSMENT — COLUMBIA-SUICIDE SEVERITY RATING SCALE - C-SSRS
2. HAVE YOU ACTUALLY HAD ANY THOUGHTS OF KILLING YOURSELF IN THE PAST MONTH?: NO
1. IN THE PAST MONTH, HAVE YOU WISHED YOU WERE DEAD OR WISHED YOU COULD GO TO SLEEP AND NOT WAKE UP?: NO
6. HAVE YOU EVER DONE ANYTHING, STARTED TO DO ANYTHING, OR PREPARED TO DO ANYTHING TO END YOUR LIFE?: NO

## 2024-11-09 ASSESSMENT — ENCOUNTER SYMPTOMS
NAUSEA: 0
BACK PAIN: 1
FEVER: 1
ABDOMINAL PAIN: 0
CHILLS: 0
VOMITING: 0
SHORTNESS OF BREATH: 0
COUGH: 0
BRUISES/BLEEDS EASILY: 0

## 2024-11-09 ASSESSMENT — ACTIVITIES OF DAILY LIVING (ADL)
ADLS_ACUITY_SCORE: 0

## 2024-11-09 NOTE — DISCHARGE INSTRUCTIONS
As we discussed, you do have evidence of a urinary tract infection.  However, I believe that you would fit any reasonable indication for further urgent testing to rule out an infection in your spine.  I have ordered an urgent MRI of your thoracic spine that can hopefully be done on Monday or Tuesday of this week.  Rest and stay hydrated.  Lidocaine patch daily.  Pain medication sparingly.  Antibiotics as prescribed.  You can start your antibiotics today.  Please return to this emergency department for any new or worsening symptoms or other questions or concerns.

## 2024-11-09 NOTE — ED TRIAGE NOTES
Stopped arthritis meds 1-2 weeks ago d/t GI bleed. C/o 10/10 bilateral lower back pain since. No fall/trauma.

## 2024-11-09 NOTE — ED PROVIDER NOTES
History     Chief Complaint   Patient presents with    Back Pain     The history is provided by the patient.     Elsi Krause is a 64 year old female who presented to the emergency department along with friend for evaluation of persistent and worsening back pain.  She reports that she was usually on meloxicam for her arthritis pain and was recently taken off this due to anemia.  She reports that has been having worsening of her lower back pain as well as relatively new onset of midline thoracic pain over the last several days.  She presented to the triage area and was noted to have a small fever.  She was not tachycardic.  She denies any chest pains, cough, or shortness of breath.  She denies any history of immunosuppression.  She denies any history of IV drug abuse or cancer.  She is not on chronic steroids.  She denies any recent falls.  She has no significant lower urinary tract symptoms.  The back pain was not abrupt in onset nor maximal intensity at onset.  Not described as ripping or tearing.    Allergies:  Allergies   Allergen Reactions    Indomethacin Nausea and Vomiting       Problem List:    Patient Active Problem List    Diagnosis Date Noted    Iron deficiency anemia due to chronic blood loss 09/26/2024     Priority: Medium    Anemia 09/12/2024     Priority: Medium    Diabetes mellitus, type 2 (H) 10/02/2023     Priority: Medium    Hx of gout 03/13/2023     Priority: Medium    Gastroesophageal reflux disease with esophagitis without hemorrhage 03/13/2023     Priority: Medium    Hyperlipidemia LDL goal <100 03/13/2023     Priority: Medium    Recurrent major depressive disorder, in partial remission (H) 03/13/2023     Priority: Medium    Anxiety 03/13/2023     Priority: Medium    Status post cervical spinal fusion 09/28/2017     Priority: Medium    Drug overdose, multiple drugs 04/20/2015     Priority: Medium    Cervical radicular pain 08/27/2014     Priority: Medium    Osteoarthritis of both feet  12/28/2011     Priority: Medium    Essential hypertension 02/07/2003     Priority: Medium     IMO Update          Past Medical History:    Past Medical History:   Diagnosis Date    ACP (advance care planning) 07/19/2016    Degenerative disc disease, lumbar     Depression     Drug overdose 04/20/2015    Hyperlipidemia     Hypertension     Impaired fasting glucose 04/04/2012    Leiomyoma of uterus, unspecified 09/19/2006    Osteoarthritis of ankle 08/22/2006    Urinary tract infection, site not specified 07/22/2002       Past Surgical History:    Past Surgical History:   Procedure Laterality Date    CARPAL TUNNEL RELEASE RT/LT Right     CERVICAL FUSION      COLONOSCOPY N/A 3/23/2023    Procedure: COLONOSCOPY;  Surgeon: Jose Angel Fiore MD;  Location: [facility]    ENDOMETRIAL SAMPLING (BIOPSY)  12/27/2001    ESOPHAGOSCOPY, GASTROSCOPY, DUODENOSCOPY (EGD), COMBINED N/A 10/22/2024    Procedure: ESOPHAGOGASTRODUODENOSCOPY with biopsy;  Surgeon: Paddy Hernandez MD;  Location: [facility] OR    FOOT SURGERY Bilateral 01/01/1973    HC EXCISE CUTANEOUS NEUROMA  11/05/1997    Times 3     TUBAL LIGATION Bilateral 04/01/1989       Family History:    Family History   Problem Relation Age of Onset    Diabetes Mother     Hypertension Mother     Hyperlipidemia Mother     Diabetes Father     Coronary Artery Disease Father     Hypertension Father     Hyperlipidemia Father     Chronic Obstructive Pulmonary Disease Father     Cancer - colorectal Father     Prostate Cancer Paternal Grandfather     Diabetes Sister     Hypertension Sister     Hyperlipidemia Sister     Neurologic Disorder Son         Cerebral palsy       Social History:  Marital Status:  Single [1]  Social History     Tobacco Use    Smoking status: Former     Passive exposure: Past    Smokeless tobacco: Never   Vaping Use    Vaping status: Some Days    Start date: 10/19/2024    Substances: THC    Devices: Disposable   Substance Use Topics    Alcohol use: Yes     Comment: Social    Drug  use: No        Medications:    cephALEXin (KEFLEX) 500 MG capsule  Lidocaine (LIDOCARE) 4 % Patch  oxyCODONE-acetaminophen (PERCOCET) 5-325 MG tablet  allopurinol (ZYLOPRIM) 300 MG tablet  colchicine (MITIGARE) 0.6 MG capsule  cyclobenzaprine (FLEXERIL) 10 MG tablet  gabapentin (NEURONTIN) 100 MG capsule  gabapentin (NEURONTIN) 300 MG capsule  lisinopril-hydrochlorothiazide (ZESTORETIC) 20-25 MG tablet  LORazepam (ATIVAN) 0.5 MG tablet  omeprazole (PRILOSEC) 40 MG DR capsule  rosuvastatin (CRESTOR) 20 MG tablet          Review of Systems   Constitutional:  Positive for fever. Negative for chills.   Respiratory:  Negative for cough and shortness of breath.    Cardiovascular:  Negative for chest pain.   Gastrointestinal:  Negative for abdominal pain, nausea and vomiting.   Genitourinary: Negative.    Musculoskeletal:  Positive for back pain.        See HPI   Allergic/Immunologic: Negative for immunocompromised state.   Hematological:  Does not bruise/bleed easily.       Physical Exam   BP: 150/80  Pulse: 97  Temp: (!) 100.5  F (38.1  C)  Resp: 20  SpO2: 96 %      Physical Exam  Vitals and nursing note reviewed.   Constitutional:       General: She is not in acute distress.     Appearance: Normal appearance. She is normal weight. She is not ill-appearing, toxic-appearing or diaphoretic.   Cardiovascular:      Rate and Rhythm: Normal rate and regular rhythm.   Pulmonary:      Effort: Pulmonary effort is normal.   Abdominal:      Palpations: Abdomen is soft.      Tenderness: There is no abdominal tenderness.   Musculoskeletal:        Back:       Comments: Very focal midline tenderness upon palpation of the thoracic spine at approximately T10.  There is no fluctuance or erythema.  She tells me this is the identical spot of her pain.   Skin:     General: Skin is warm and dry.      Capillary Refill: Capillary refill takes less than 2 seconds.   Neurological:      General: No focal deficit present.      Mental Status: She is  alert and oriented to person, place, and time.   Psychiatric:         Mood and Affect: Mood normal.         ED Course     ED Course as of 11/09/24 1845   Sat Nov 09, 2024   1329 WBC: 7.7   1329 Lactic Acid: 0.7   1329 Hemoglobin(!): 10.0  Hemoglobin is improved   1331 Broad differential diagnosis is considered and includes but is not limited to viral illness, mechanical back pain, epidural abscess, discitis, pyelonephritis, exacerbation of chronic low back pain, aortic dissection.   1340 CRP Inflammation(!): 72.13   1340 Sed Rate(!): 78   1400 Procalcitonin: 0.09   1611 Patient reports complete resolution of her pain     Procedures              Critical Care time:  none     IV Antibiotics given and/or elevated Lactate of 0.7 and no sepsis note found - Delete this reminder and enter the sepsis note or '.edcms' before signing chart.>>>         Results for orders placed or performed during the hospital encounter of 11/09/24 (from the past 24 hours)   CBC with platelets differential    Narrative    The following orders were created for panel order CBC with platelets differential.  Procedure                               Abnormality         Status                     ---------                               -----------         ------                     CBC with platelets and d...[[id number]]  Abnormal            Final result                 Please view results for these tests on the individual orders.   Basic metabolic panel   Result Value Ref Range    Sodium 133 (L) 135 - 145 mmol/L    Potassium 3.8 3.4 - 5.3 mmol/L    Chloride 95 (L) 98 - 107 mmol/L    Carbon Dioxide (CO2) 23 22 - 29 mmol/L    Anion Gap 15 7 - 15 mmol/L    Urea Nitrogen 16.4 8.0 - 23.0 mg/dL    Creatinine 1.03 (H) 0.51 - 0.95 mg/dL    GFR Estimate 61 >60 mL/min/1.73m2    Calcium 10.2 8.8 - 10.4 mg/dL    Glucose 134 (H) 70 - 99 mg/dL   Procalcitonin   Result Value Ref Range    Procalcitonin 0.09 <0.50 ng/mL   Lactic acid whole blood with 1x repeat in 2  hr when >2   Result Value Ref Range    Lactic Acid, Initial 0.7 0.7 - 2.0 mmol/L   CBC with platelets and differential   Result Value Ref Range    WBC Count 7.7 4.0 - 11.0 10e3/uL    RBC Count 3.94 3.80 - 5.20 10e6/uL    Hemoglobin 10.0 (L) 11.7 - 15.7 g/dL    Hematocrit 31.9 (L) 35.0 - 47.0 %    MCV 81 78 - 100 fL    MCH 25.4 (L) 26.5 - 33.0 pg    MCHC 31.3 (L) 31.5 - 36.5 g/dL    RDW 22.1 (H) 10.0 - 15.0 %    Platelet Count 354 150 - 450 10e3/uL    % Neutrophils 77 %    % Lymphocytes 14 %    % Monocytes 8 %    % Eosinophils 1 %    % Basophils 0 %    % Immature Granulocytes 0 %    NRBCs per 100 WBC 0 <1 /100    Absolute Neutrophils 5.9 1.6 - 8.3 10e3/uL    Absolute Lymphocytes 1.1 0.8 - 5.3 10e3/uL    Absolute Monocytes 0.6 0.0 - 1.3 10e3/uL    Absolute Eosinophils 0.0 0.0 - 0.7 10e3/uL    Absolute Basophils 0.0 0.0 - 0.2 10e3/uL    Absolute Immature Granulocytes 0.0 <=0.4 10e3/uL    Absolute NRBCs 0.0 10e3/uL   CRP inflammation   Result Value Ref Range    CRP Inflammation 72.13 (H) <5.00 mg/L   Erythrocyte sedimentation rate auto   Result Value Ref Range    Erythrocyte Sedimentation Rate 78 (H) 0 - 30 mm/hr   UA with Microscopic reflex to Culture    Specimen: Urine, Midstream   Result Value Ref Range    Color Urine Light Yellow Colorless, Straw, Light Yellow, Yellow    Appearance Urine Clear Clear    Glucose Urine Negative Negative mg/dL    Bilirubin Urine Negative Negative    Ketones Urine Negative Negative mg/dL    Specific Gravity Urine 1.032 1.003 - 1.035    Blood Urine Negative Negative    pH Urine 5.5 4.7 - 8.0    Protein Albumin Urine Negative Negative mg/dL    Urobilinogen Urine Normal Normal, 2.0 mg/dL    Nitrite Urine Positive (A) Negative    Leukocyte Esterase Urine Large (A) Negative    Bacteria Urine Few (A) None Seen /HPF    Mucus Urine Present (A) None Seen /LPF    RBC Urine 1 <=2 /HPF    WBC Urine 28 (H) <=5 /HPF    Squamous Epithelials Urine 5 (H) <=1 /HPF    Narrative    Urine Culture ordered  based on laboratory criteria   Symptomatic Influenza A/B, RSV, & SARS-CoV2 PCR (COVID-19) Nasopharyngeal    Specimen: Nasopharyngeal; Swab   Result Value Ref Range    Influenza A PCR Negative Negative    Influenza B PCR Negative Negative    RSV PCR Negative Negative    SARS CoV2 PCR Negative Negative    Narrative    Testing was performed using the Xpert Xpress CoV2/Flu/RSV Assay on the [employer] GeneXpert Instrument. This test should be ordered for the detection of SARS-CoV2, influenza, and RSV viruses in individuals with signs and symptoms of respiratory tract infection. This test is for in vitro diagnostic use under the [place] FDA for laboratories certified under CLIA to perform high or moderate complexity testing. This test has been [place] FDA cleared. A negative result does not rule out the presence of PCR inhibitors in the specimen or target RNA in concentration below the limit of detection for the assay. If only one viral target is positive but coinfection with multiple targets is suspected, the sample should be re-tested with another FDA cleared, approved, or authorized test, if coninfection would change clinical management. This test was validated by the [facility]. These laboratories are certified under the Clinical Laboratory Improvement Amendments of 1988 (CLIA-88) as qualified to perfom high complexity laboratory testing.       Medications   Lidocaine (LIDOCARE) 4 % Patch 1 patch (1 patch Transdermal $Patch/Med Applied 11/9/24 1646)   ketorolac (TORADOL) injection 15 mg (15 mg Intravenous $Given 11/9/24 1331)   diazepam (VALIUM) injection 2.5 mg (2.5 mg Intravenous $Given 11/9/24 1331)   iopamidol (ISOVUE-370) solution 75 mL (75 mLs Intravenous $Given 11/9/24 1420)   sodium chloride (PF) 0.9% PF flush 60 mL (60 mLs Intracatheter $Given 11/9/24 1420)   cefTRIAXone in d5w (ROCEPHIN) intermittent infusion 1 g (0 g Intravenous Stopped 11/9/24 1722)       Assessments & Plan (with Medical Decision  Making)   64-year-old female who presented to the emergency department for evaluation of lower thoracic back pain.  She does report a history of lumbar pain and tells me she has been taken off her usual chronic NSAIDs due to anemia.  She denies any falls.  She denies any chest pain.  Denies cough or shortness of breath.  She has no concerning neurologic features or evidence of myelopathy.  She has normal gait.  She has no risk factors for epidural abscess.  However, given the patient's acute onset of midline back pain with focal tenderness, as well as the presenting fever and elevated inflammatory markers to this diagnosis would certainly be on the differential.  However, given the patient's nitrite positive urine I do not believe that she fits any reasonable indication for emergent MRI at this time.  CT the lumbar spine was unrevealing.  I had a long and exhaustive conversation with the patient regarding further workup.  I did place an urgent MRI order for hopefully Monday or Tuesday to rule out discitis/epidural abscess.  She reports near complete resolution of her pain after simple Toradol and Valium.  Strict return precautions were provided.  Close clinic follow-up was discussed.  She was given Rocephin in the emergency department will be discharged on Keflex for urinary tract infection.  The patient was quite happy and agreeable with the plan.  She voiced complete understanding.    This document was prepared using a combination of typing and voice generated software.  While every attempt was made for accuracy, spelling and grammatical errors may exist.     I have reviewed the nursing notes.    I have reviewed the findings, diagnosis, plan and need for follow up with the patient.           Medical Decision Making  The patient's presentation was of moderate complexity (an undiagnosed new problem with uncertain prognosis).    The patient's evaluation involved:  strong consideration of a test (emergent MRI) that  was ultimately deferred  ordering and/or review of 3+ test(s) in this encounter (multiple labs, CT scan)    The patient's management necessitated moderate risk (prescription drug management including medications given in the ED), moderate risk (IV contrast administration), and high risk (a parenteral controlled substance).        Discharge Medication List as of 11/9/2024  5:22 PM        START taking these medications    Details   cephALEXin (KEFLEX) 500 MG capsule Take 1 capsule (500 mg) by mouth 4 times daily for 7 days., Disp-28 capsule, R-0, E-Prescribe      Lidocaine (LIDOCARE) 4 % Patch Place 1 patch over 12 hours onto the skin every 24 hours for 10 days. To prevent lidocaine toxicity, patient should be patch free for 12 hrs daily.Disp-10 patch, [id number]      oxyCODONE-acetaminophen (PERCOCET) 5-325 MG tablet Take 1 tablet by mouth every 6 hours as needed for pain., Disp-12 tablet, R-0, E-Prescribe             Final diagnoses:   Fever   Acute midline thoracic back pain   UTI (urinary tract infection)       11/9/2024   [facility]       Yunior Spaulding PA-C  11/09/24 5626       Yunior Spaulding PA-C  11/09/24 2712

## 2024-11-09 NOTE — LETTER
November 9, 2024      To Whom It May Concern:      Elsi Krause was seen in our Emergency Department today, 11/09/24.  Please excuse her from work on November 11, 12th, and 13th due to illness      Sincerely,            Yunior Spaulding PA-C

## 2024-11-11 ENCOUNTER — HOSPITAL ENCOUNTER (OUTPATIENT)
Dept: MRI IMAGING | Facility: HOSPITAL | Age: 64
Discharge: HOME | End: 2024-11-11
Attending: PHYSICIAN ASSISTANT | Admitting: PHYSICIAN ASSISTANT
Payer: COMMERCIAL

## 2024-11-11 DIAGNOSIS — M54.6 ACUTE MIDLINE THORACIC BACK PAIN: ICD-10-CM

## 2024-11-11 DIAGNOSIS — R50.9 FEVER: ICD-10-CM

## 2024-11-11 LAB — BACTERIA UR CULT: NORMAL

## 2024-11-11 PROCEDURE — 72157 MRI CHEST SPINE W/O & W/DYE: CPT

## 2024-11-11 PROCEDURE — 255N000002 HC RX 255 OP 636: Performed by: RADIOLOGY

## 2024-11-11 PROCEDURE — A9585 GADOBUTROL INJECTION: HCPCS | Performed by: RADIOLOGY

## 2024-11-11 RX ORDER — GADOBUTROL 604.72 MG/ML
7.5 INJECTION INTRAVENOUS ONCE
Status: COMPLETED | OUTPATIENT
Start: 2024-11-11 | End: 2024-11-11

## 2024-11-11 RX ADMIN — GADOBUTROL 7.5 ML: 604.72 INJECTION INTRAVENOUS at 16:19

## 2024-11-12 ENCOUNTER — MYC MEDICAL ADVICE (OUTPATIENT)
Dept: FAMILY MEDICINE | Facility: OTHER | Age: 64
End: 2024-11-12

## 2024-11-12 DIAGNOSIS — M47.814 OSTEOARTHRITIS OF THORACIC SPINE, UNSPECIFIED SPINAL OSTEOARTHRITIS COMPLICATION STATUS: Primary | ICD-10-CM

## 2024-11-15 LAB — BACTERIA BLD CULT: NO GROWTH

## 2024-11-19 ENCOUNTER — OFFICE VISIT (OUTPATIENT)
Dept: FAMILY MEDICINE | Facility: OTHER | Age: 64
End: 2024-11-19
Attending: STUDENT IN AN ORGANIZED HEALTH CARE EDUCATION/TRAINING PROGRAM
Payer: COMMERCIAL

## 2024-11-19 VITALS
DIASTOLIC BLOOD PRESSURE: 72 MMHG | WEIGHT: 155 LBS | HEIGHT: 65 IN | BODY MASS INDEX: 25.83 KG/M2 | HEART RATE: 80 BPM | SYSTOLIC BLOOD PRESSURE: 108 MMHG | TEMPERATURE: 97.7 F | OXYGEN SATURATION: 99 %

## 2024-11-19 DIAGNOSIS — M19.90 GENERALIZED ARTHRITIS: Primary | ICD-10-CM

## 2024-11-19 DIAGNOSIS — G89.29 CHRONIC PAIN OF BOTH SHOULDERS: ICD-10-CM

## 2024-11-19 DIAGNOSIS — F41.9 ANXIETY: ICD-10-CM

## 2024-11-19 DIAGNOSIS — M25.511 CHRONIC PAIN OF BOTH SHOULDERS: ICD-10-CM

## 2024-11-19 DIAGNOSIS — M25.512 CHRONIC PAIN OF BOTH SHOULDERS: ICD-10-CM

## 2024-11-19 RX ORDER — CYCLOBENZAPRINE HCL 10 MG
10 TABLET ORAL 3 TIMES DAILY PRN
Qty: 90 TABLET | Refills: 0 | Status: SHIPPED | OUTPATIENT
Start: 2024-11-19

## 2024-11-19 RX ORDER — LORAZEPAM 0.5 MG/1
0.5 TABLET ORAL
Qty: 28 TABLET | Refills: 0 | Status: SHIPPED | OUTPATIENT
Start: 2024-11-19

## 2024-11-19 RX ORDER — CELECOXIB 100 MG/1
100-200 CAPSULE ORAL DAILY
Qty: 60 CAPSULE | Refills: 0 | Status: SHIPPED | OUTPATIENT
Start: 2024-11-19

## 2024-11-19 ASSESSMENT — PATIENT HEALTH QUESTIONNAIRE - PHQ9
10. IF YOU CHECKED OFF ANY PROBLEMS, HOW DIFFICULT HAVE THESE PROBLEMS MADE IT FOR YOU TO DO YOUR WORK, TAKE CARE OF THINGS AT HOME, OR GET ALONG WITH OTHER PEOPLE: NOT DIFFICULT AT ALL
SUM OF ALL RESPONSES TO PHQ QUESTIONS 1-9: 4
SUM OF ALL RESPONSES TO PHQ QUESTIONS 1-9: 4

## 2024-11-19 ASSESSMENT — PAIN SCALES - GENERAL: PAINLEVEL_OUTOF10: MODERATE PAIN (5)

## 2024-11-19 NOTE — PROGRESS NOTES
Assessment & Plan     Generalized arthritis  Multifocal arthritis pain with fairly drastic exacerbation since stopping maintenance meloxicam a couple of months ago for severe anemia.  Also had recent exacerbation of acute on chronic low back pain; this has stabilized some but will be seeing spine orthopedics.  Symptoms evolve from day-to-day but generally affect virtually all joints at different times.  No erythema, edema, warmth, or other features of inflammatory or infectious arthritis.  She is now on iron infusions for anemia and hemoglobin has improved.  Did have a EGD 10/22 which was normal.  Had very extensive discussion today about risks of NSAIDs, particularly in the setting of anemia and baseline mildly elevated creatinine, although recently improved.  However also previously had much higher quality of life and was much more functional on Ferguson 2 inhibitor.  Discussed a variety of options including possible trial of Celebrex; she is well aware of the risks and recommend close lab monitoring of Hgb and Cr.  - celecoxib (CELEBREX) 100 MG capsule; Take 1-2 capsules (100-200 mg) by mouth daily.  - Continue maintenance omeprazole 40 mg daily  - Very important to continue to maximize supportive cares including as needed diclofenac gel, heat/ice packs, stretching/strengthening routine  - Also recommended very strong consideration in cutting back on her work shifts as this clearly aggravates her symptoms  - Reviewed S/S that warrant reevaluation    Chronic pain of both shoulders  Ongoing without recent change, Flexeril helps at night.  Refilled today.  - cyclobenzaprine (FLEXERIL) 10 MG tablet; Take 1 tablet (10 mg) by mouth 3 times daily as needed for muscle spasms.    Anxiety  Long-term stability on current benzo therapy.  Reliable.  Reiterated risks of chronic benzodiazepine use.  Refilled today.  - LORazepam (ATIVAN) 0.5 MG tablet; Take 1 tablet (0.5 mg) by mouth nightly as needed for anxiety.    I spent a  total of 41 minutes on the day of the visit.   Time spent by me today doing chart review, history and exam, documentation and further activities per the note    The longitudinal plan of care for the diagnosis(es)/condition(s) as documented were addressed during this visit. Due to the added complexity in care, I will continue to support Elsi in the subsequent management and with ongoing continuity of care.    Follow-up in about 1 month.    Subjective   Elsi is a 64 year old, presenting for the following health issues:  Pain        11/19/2024     8:29 AM   Additional Questions   Roomed by Prema HENSON   Accompanied by self     History of Present Illness       Reason for visit:  Pain from artheritis    She eats 0-1 servings of fruits and vegetables daily.She consumes 0 sweetened beverage(s) daily.She exercises with enough effort to increase her heart rate 60 or more minutes per day.  She exercises with enough effort to increase her heart rate 5 days per week.   She is taking medications regularly.       Pain History:  When did you first notice your pain? Ongoing    Have you seen this provider for your pain in the past? Yes   Where in your body do you have pain? wrist, hands, whole back, legs   Are you seeing anyone else for your pain? No        9/11/2024     3:32 PM 9/26/2024     1:00 PM 11/19/2024     8:26 AM   PHQ-9 SCORE   PHQ-9 Total Score MyChart 10 (Moderate depression)  4 (Minimal depression)   PHQ-9 Total Score 10 8 4        Patient-reported           5/2/2024    11:05 AM 9/11/2024     3:33 PM 9/30/2024     2:46 PM   ROBY-7 SCORE   Total Score 13 (moderate anxiety) 7 (mild anxiety) 8 (mild anxiety)   Total Score 13 7 8           Chronic Pain Follow Up:  Location of pain: wrist, hands, legs, back   Analgesia/pain control:    - Recent changes:  worsening     - Overall control: Tolerable with discomfort    - Current treatments: lidocaine patches, tylenol, went off of arthritis medication, oxycodone     Adherence:   "   - Do you ever take more pain medicine than prescribed? No    - When did you take your last dose of pain medicine?  Oxycodone last night    Adverse effects: No     -Ongoing issue for the past couple months ever since stopping her previously longstanding meloxicam  -Dealing with severe anemia requiring iron transfusions  -Eventually did undergo EGD 10/22/2024 which was a bit surprisingly very normal  -Does have upcoming evaluation with OB/GYN and oncology    -Really struggling to function on a daily basis due to generalized aches and pains  -Some migratory features but virtually all joints included at different times  -Also acute on chronic exacerbation of low back pain including ER visit 11/9/2024  -MRI updated 11/11 and referred to spine orthopedics    -Still working physically demanding job with long shifts as a /  -Really struggles following heavy workdays  -Pain is so bad some days that she can hardly move or function  -Maybe gets very slight effusion when joints are really bad but no significant swelling, redness, warmth  -No personal or family history of rheumatoid/inflammatory arthritis    -Did add gabapentin in the past month, maybe helps a little bit but not enough  -Previously had been very well-controlled on daily meloxicam    Review of Systems  Constitutional, HEENT, cardiovascular, pulmonary, gi and gu systems are negative, except as otherwise noted.      Objective    /72 (BP Location: Right arm, Patient Position: Sitting, Cuff Size: Adult Regular)   Pulse 80   Temp 97.7  F (36.5  C) (Tympanic)   Ht 1.651 m (5' 5\")   Wt 70.3 kg (155 lb)   SpO2 99%   BMI 25.79 kg/m    Body mass index is 25.79 kg/m .  Physical Exam  Vitals reviewed.   Constitutional:       General: She is not in acute distress.     Appearance: Normal appearance. She is not toxic-appearing.   HENT:      Head: Normocephalic and atraumatic.   Cardiovascular:      Rate and Rhythm: Normal rate and regular rhythm. "      Heart sounds: Normal heart sounds.   Pulmonary:      Breath sounds: Normal breath sounds.   Musculoskeletal:         General: Normal range of motion.   Skin:     General: Skin is warm and dry.   Neurological:      General: No focal deficit present.      Mental Status: She is alert and oriented to person, place, and time.   Psychiatric:         Mood and Affect: Mood normal.         Behavior: Behavior normal.          Signed Electronically by: Donaldo Seymour MD

## 2024-12-12 ENCOUNTER — OFFICE VISIT (OUTPATIENT)
Dept: OBGYN | Facility: OTHER | Age: 64
End: 2024-12-12
Attending: OBSTETRICS & GYNECOLOGY
Payer: COMMERCIAL

## 2024-12-12 VITALS
SYSTOLIC BLOOD PRESSURE: 124 MMHG | HEIGHT: 65 IN | DIASTOLIC BLOOD PRESSURE: 70 MMHG | HEART RATE: 64 BPM | BODY MASS INDEX: 25.83 KG/M2 | WEIGHT: 155 LBS

## 2024-12-12 DIAGNOSIS — R87.618 PAP SMEAR ABNORMALITY OF CERVIX/HUMAN PAPILLOMAVIRUS (HPV) POSITIVE: Primary | ICD-10-CM

## 2024-12-12 PROCEDURE — 88305 TISSUE EXAM BY PATHOLOGIST: CPT | Mod: 26 | Performed by: PATHOLOGY

## 2024-12-12 ASSESSMENT — PAIN SCALES - GENERAL: PAINLEVEL_OUTOF10: NO PAIN (0)

## 2024-12-12 NOTE — PROGRESS NOTES
"Elsi Krause is a 64 year old female P3   who presents for abnormal pap smear evaluation referred by Dr. Villa.     Pap smear 1 months ago showed: normal and with high risk HPV present: (other). The prior pap showed normal and with high risk HPV present: (other).       No LMP recorded. Patient is postmenopausal.     Past GYN history:  HPV  Prior cervical/vaginal disease: Normal exam without visible pathology.  Prior cervical treatment: no treatment.  Tobacco use:No    PMH, Fam Hx, Soc Hx Reviewed.    ROS:  Neg GI/    PROCEDURE:  Before the procedure, it was ensured that the patient was educated regarding the nature of her findings to date, the implications, and what was to be done. She has been made aware of the role of HPV, the natural history of infection, ways to minimize her future risk, the effect of HPV on the cervix, and treatment options available should they be indicated.  The details of the colposcopic procedure were reviewed.  All questions were answered before proceeding, and informed consent was therefore obtained.    Speculum placed in vagina and excellent visualization of cervix   acheived, cervix swabbed x 3 with acetic acid solution.    FINDINGS:  EXAM:  Blood pressure 124/70, pulse 64, height 1.651 m (5' 5\"), weight 70.3 kg (155 lb), not currently breastfeeding.  BMI= Body mass index is 25.79 kg/m .  No LMP recorded. Patient is postmenopausal.  General - pleasant female in no acute distress.  Neurological - alert and oriented X 3  Psychiatric - normal mood and affect    Pelvic-  Cervix: acetowhitening noted 6:00, biopsy done      Pap repeated?:  No  SCJ seen?:  yes    ECC done?:  No  Lugol's solution used?:  No  Satisfactory examination?:  yes      ASSESSMENT: HPV related changes. R/o dysplasia.     PLAN: specimens labelled and sent to Pathology, will base further treatment on Pathology findings, treatment options discussed with patient, post biopsy instructions given to patient, and call to " discuss Pathology results    Discussed cervical dysplasia/HPV, importance of follow up.  Handouts and my card and phone number given to patient.  She will call if she has not heard results within 2 weeks.    Jorge Andino MD

## 2024-12-15 ENCOUNTER — MYC REFILL (OUTPATIENT)
Dept: FAMILY MEDICINE | Facility: OTHER | Age: 64
End: 2024-12-15

## 2024-12-15 DIAGNOSIS — M19.90 GENERALIZED ARTHRITIS: ICD-10-CM

## 2024-12-16 RX ORDER — CELECOXIB 100 MG/1
100-200 CAPSULE ORAL DAILY
Qty: 60 CAPSULE | Refills: 0 | Status: SHIPPED | OUTPATIENT
Start: 2024-12-16

## 2024-12-16 NOTE — TELEPHONE ENCOUNTER
celecoxib (CELEBREX) 100 MG capsule         Last Written Prescription Date:  11/19/24  Last Fill Quantity: 60,   # refills: 0  Last Office Visit: 11/19/24  Future Office visit:    Next 5 appointments (look out 90 days)      Dec 26, 2024 10:30 AM  (Arrive by 10:15 AM)  Return Visit with Nicolasa Wiley MD  [facility] ([facility] ) [address] [address]  [phone]     Dec 30, 2024 9:30 AM  (Arrive by 9:15 AM)  Provider Visit with Donaldo Seymour MD  [facility] ([facility] ) [address] [address]  [phone]             Routing refill request to provider for review/approval because:  NSAID Medications Failed      Normal CBC on file in past 12 months        Recent Labs   Lab Test 11/09/24  1316   WBC 7.7   RBC 3.94   HGB 10.0*   HCT 31.9*              Always Fail Criteria - Chart Review Required    Validate Diagnosis. If the medication is requested for an acute flare of a chronic pain associated with a musculoskeletal or rheumatologic condition; okay to authorize if all other criteria are met. If not, then forward to provider for review

## 2024-12-17 ENCOUNTER — MYC MEDICAL ADVICE (OUTPATIENT)
Dept: FAMILY MEDICINE | Facility: OTHER | Age: 64
End: 2024-12-17

## 2024-12-18 NOTE — TELEPHONE ENCOUNTER
Donaldo Seymour MD  You39 minutes ago (7:41 AM)     JK  Should do visit and will check UA, okay to add her on today or Friday.  Thanks

## 2024-12-18 NOTE — TELEPHONE ENCOUNTER
Writer spoke with patient. Patient reports burning and urgency. Patient denies fevers. Patient reports back pain is at baseline.  Patient also experiencing some dizziness that she states she has experienced in the past and spoke with provider about.

## 2024-12-26 ENCOUNTER — ONCOLOGY VISIT (OUTPATIENT)
Dept: ONCOLOGY | Facility: OTHER | Age: 64
End: 2024-12-26
Attending: INTERNAL MEDICINE
Payer: COMMERCIAL

## 2024-12-26 ENCOUNTER — TELEPHONE (OUTPATIENT)
Dept: MAMMOGRAPHY | Facility: HOSPITAL | Age: 64
End: 2024-12-26

## 2024-12-26 ENCOUNTER — ANCILLARY PROCEDURE (OUTPATIENT)
Dept: MAMMOGRAPHY | Facility: OTHER | Age: 64
End: 2024-12-26
Attending: NURSE PRACTITIONER
Payer: COMMERCIAL

## 2024-12-26 VITALS
SYSTOLIC BLOOD PRESSURE: 100 MMHG | DIASTOLIC BLOOD PRESSURE: 60 MMHG | WEIGHT: 154.54 LBS | BODY MASS INDEX: 25.75 KG/M2 | OXYGEN SATURATION: 98 % | RESPIRATION RATE: 20 BRPM | HEIGHT: 65 IN | TEMPERATURE: 97.7 F | HEART RATE: 79 BPM

## 2024-12-26 DIAGNOSIS — D50.0 IRON DEFICIENCY ANEMIA DUE TO CHRONIC BLOOD LOSS: Primary | ICD-10-CM

## 2024-12-26 DIAGNOSIS — Z12.31 ENCOUNTER FOR SCREENING MAMMOGRAM FOR BREAST CANCER: ICD-10-CM

## 2024-12-26 PROCEDURE — 77067 SCR MAMMO BI INCL CAD: CPT | Mod: TC | Performed by: RADIOLOGY

## 2024-12-26 PROCEDURE — 77063 BREAST TOMOSYNTHESIS BI: CPT | Mod: TC | Performed by: RADIOLOGY

## 2024-12-26 ASSESSMENT — PAIN SCALES - GENERAL: PAINLEVEL_OUTOF10: NO PAIN (0)

## 2024-12-26 NOTE — PROGRESS NOTES
HEMATOLOGY CONSULT NOTE  Dec 26, 2024    Reason for consult: Anemia    HISTORY OF PRESENT ILLNESS  Elsi Krause is a 64 year old female with PMH as stated below who was seen in the hematology clinic for anemia    Her history in short is as follows:    He was not show progressive anemia starting from 2/22/2024.  At that time found to have a hemoglobin of 11.4 with MCV of 81.  Ferritin checked on 5/2/2024 was 45 with an iron saturation of 12 however it has decreased to a ferritin of 8 on 9/11/2024 with an iron saturation of 4.    She was started on iron replacement in May 2024 but was not able to tolerate it.  She  has been feeling very fatigued, cold, dizzy.  Denies any shortness of breath or chest pain.  Did require 1 unit of PRBC on 9/11/2024.  At that time her hemoglobin was 7.8.  Denies any blood in stools, change in color stools, or blood in urine.  Denies any abdominal pain, weight loss or appetite loss.    7/12/2024: CT abdomen pelvis with contrast:IMPRESSION: No intraperitoneal masses or inflammatory changes.     Her last colonoscopy was on 3/23/2023.  At that time she was found to have mild left-sided diverticulosis.  Plan to repeat colonoscopy in 5 years.  She had a EGD done on 10/22/2024 which did not show any source of bleeding.    Interim history:    She is doing well.  Denies any blood in stools.  Denies any blood in urine.  Feels a little better after her iron infusion November 2024.  However continues to feel tired    REVIEW OF SYSTEMS  A 12-point ROS negative except as in HPI      Current Outpatient Medications   Medication Sig Dispense Refill    allopurinol (ZYLOPRIM) 300 MG tablet Take 1 tablet by mouth once daily 90 tablet 3    celecoxib (CELEBREX) 100 MG capsule Take 1-2 capsules (100-200 mg) by mouth daily. 60 capsule 0    cyclobenzaprine (FLEXERIL) 10 MG tablet Take 1 tablet (10 mg) by mouth 3 times daily as needed for muscle spasms. 90 tablet 0    lisinopril-hydrochlorothiazide (ZESTORETIC)  20-25 MG tablet Take 1 tablet by mouth once daily 90 tablet 3    LORazepam (ATIVAN) 0.5 MG tablet Take 1 tablet (0.5 mg) by mouth nightly as needed for anxiety. 28 tablet 0    omeprazole (PRILOSEC) 40 MG DR capsule Take 1 capsule by mouth once daily 90 capsule 3    rosuvastatin (CRESTOR) 20 MG tablet Take 1 tablet by mouth once daily 90 tablet 0    colchicine (MITIGARE) 0.6 MG capsule Take 0.6 mg by mouth daily. (Patient not taking: Reported on 12/26/2024)         Allergies   Allergen Reactions    Indomethacin Nausea and Vomiting     Immunization History   Administered Date(s) Administered    COVID-19 Vaccine (Tricia) 03/09/2021, 12/09/2021    Influenza Vaccine >6 months,quad, PF 11/16/2020    TDAP Vaccine (Adacel) 02/02/2011    TDAP Vaccine (Boostrix) 02/02/2011       Past Medical History:   Diagnosis Date    ACP (advance care planning) 07/19/2016    Advance Care Planning 7/19/2016: ACP Review of Chart / Resources Provided:  Reviewed chart for advance care plan.  Elsi Krause has no plan or code status on file. Discussed available resources and provided with information. Confirmed code status reflects current choices pending further ACP discussions.  Confirmed/documented legally designated decision makers.  Added by CASTILLO REYNA       Degenerative disc disease, lumbar     Depression     Drug overdose 04/20/2015    Hyperlipidemia     Hypertension     Impaired fasting glucose 04/04/2012    Leiomyoma of uterus, unspecified 09/19/2006    IMO Update 10/11    Osteoarthritis of ankle 08/22/2006    IMO Update 10/11    Urinary tract infection, site not specified 07/22/2002    Also 9/11/01, 11/18/04 IMO Update 10/11       Past Surgical History:   Procedure Laterality Date    CARPAL TUNNEL RELEASE RT/LT Right     CERVICAL FUSION      COLONOSCOPY N/A 3/23/2023    Procedure: COLONOSCOPY;  Surgeon: Jose Angel Fiore MD;  Location: [facility]    ENDOMETRIAL SAMPLING (BIOPSY)  12/27/2001    ESOPHAGOSCOPY, GASTROSCOPY, DUODENOSCOPY  "(EGD), COMBINED N/A 10/22/2024    Procedure: ESOPHAGOGASTRODUODENOSCOPY with biopsy;  Surgeon: Paddy Hernandez MD;  Location: [facility] OR    FOOT SURGERY Bilateral 01/01/1973    HC EXCISE CUTANEOUS NEUROMA  11/05/1997    Times 3     TUBAL LIGATION Bilateral 04/01/1989       SOCIAL HISTORY  History   Smoking Status    Former   Smokeless Tobacco    Never    Social History    Substance and Sexual Activity      Alcohol use: Yes        Comment: Social     History   Drug Use No       FAMILY HISTORY  Family History   Problem Relation Age of Onset    Diabetes Mother     Hypertension Mother     Hyperlipidemia Mother     Diabetes Father     Coronary Artery Disease Father     Hypertension Father     Hyperlipidemia Father     Chronic Obstructive Pulmonary Disease Father     Cancer - colorectal Father     Prostate Cancer Paternal Grandfather     Diabetes Sister     Hypertension Sister     Hyperlipidemia Sister     Neurologic Disorder Son         Cerebral palsy       PHYSICAL EXAMINATION  /60   Pulse 79   Temp 97.7  F (36.5  C) (Tympanic)   Resp 20   Ht 1.651 m (5' 5\")   Wt 70.1 kg (154 lb 8.7 oz)   SpO2 98%   BMI 25.72 kg/m    Wt Readings from Last 2 Encounters:   12/26/24 70.1 kg (154 lb 8.7 oz)   12/20/24 70.3 kg (155 lb)     Physical Exam  Constitutional:       Appearance: Normal appearance.   Pulmonary:      Effort: Pulmonary effort is normal.   Skin:     Coloration: Skin is pale.   Neurological:      General: No focal deficit present.      Mental Status: She is alert and oriented to person, place, and time.     Laboratory and imaging:   Latest Reference Range & Units 12/20/24 13:54   WBC 4.0 - 11.0 10e3/uL 5.9   Hemoglobin 11.7 - 15.7 g/dL 10.6 (L)   Hematocrit 35.0 - 47.0 % 32.0 (L)   Platelet Count 150 - 450 10e3/uL 358   (L): Data is abnormally low      ASSESSMENT AND PLAN    1.  Iron deficiency anemia:    Found to have progressive anemia since February 2024.  She was then found to have a hemoglobin of 7.8 on " 9/11/2024.  Required 1 unit PRBC.  She was also found to become progressively more iron deficient as her ferritin went from 45 in May 24 to 8 in September 2024.  She did try oral iron in the past however was not able to tolerate it.   Discussed recommendation for IV iron as she is intolerant to oral iron.  Risk of anaphylaxis discussed she is agreeable.  Will plan for Venofer 200 into 5 times.  She then received her iron infusion in November 2024. Her ferritin on 12/20/2024 was 153 with iron saturation of 13.  Her hemoglobin is 10.6.  She symptomatically does feel better however continues to have fatigue.  She is not able to tolerate oral iron.  Therefore will monitor for now and consider IV iron if she needs it again.    Her vitamin B12 was 378 and a normal folate.  Methylmalonic acid and intrinsic antibody were also checked.  Methylmalonic acid was mildly elevated.  Therefore recommend she take vitamin B12.      Will see her back in 3 months with repeat CBCD, iron panel with ferritin and iron saturation.    Total time spent on the patient on day of encounter was 30 minutes doing chart review, review of test results, interpretation of results, patient visit and documentation.       Nicolasa Wiley MD

## 2024-12-26 NOTE — NURSING NOTE
"Oncology Rooming Note    December 26, 2024 10:13 AM   Elsi Krause is a 64 year old female who presents for:    Chief Complaint   Patient presents with    Hematology     Follow up Iron Deficiency Anemia     Initial Vitals: /60   Pulse 79   Temp 97.7  F (36.5  C) (Tympanic)   Resp 20   Ht 1.651 m (5' 5\")   Wt 70.1 kg (154 lb 8.7 oz)   SpO2 98%   BMI 25.72 kg/m   Estimated body mass index is 25.72 kg/m  as calculated from the following:    Height as of this encounter: 1.651 m (5' 5\").    Weight as of this encounter: 70.1 kg (154 lb 8.7 oz). Body surface area is 1.79 meters squared.  No Pain (0) Comment: Data Unavailable   No LMP recorded. Patient is postmenopausal.  Allergies reviewed: Yes  Medications reviewed: Yes    Medications: Medication refills not needed today.  Pharmacy name entered into [facility]:    [id number] - [place], [place] - [address] [facility] [address] [place] [address] PHARMACY [id number]  [place], [place] - [address] [address]    Frailty Screening:   Is the patient here for a new oncology consult visit in cancer care? 2. No      Clinical concerns: Mild Fatigue and 2 episodes of vertigo since Thanksgiving      Zofia Chavez LPN             "

## 2025-01-11 ENCOUNTER — HEALTH MAINTENANCE LETTER (OUTPATIENT)
Age: 65
End: 2025-01-11

## 2025-01-16 ENCOUNTER — MYC REFILL (OUTPATIENT)
Dept: FAMILY MEDICINE | Facility: OTHER | Age: 65
End: 2025-01-16

## 2025-01-16 DIAGNOSIS — M19.90 GENERALIZED ARTHRITIS: ICD-10-CM

## 2025-01-16 RX ORDER — CELECOXIB 100 MG/1
100-200 CAPSULE ORAL DAILY
Qty: 60 CAPSULE | Refills: 0 | Status: SHIPPED | OUTPATIENT
Start: 2025-01-16

## 2025-01-17 ENCOUNTER — TRANSFERRED RECORDS (OUTPATIENT)
Dept: HEALTH INFORMATION MANAGEMENT | Facility: CLINIC | Age: 65
End: 2025-01-17
Payer: COMMERCIAL

## 2025-02-12 ENCOUNTER — MYC REFILL (OUTPATIENT)
Dept: FAMILY MEDICINE | Facility: OTHER | Age: 65
End: 2025-02-12

## 2025-02-12 DIAGNOSIS — G89.29 CHRONIC PAIN OF BOTH SHOULDERS: ICD-10-CM

## 2025-02-12 DIAGNOSIS — M25.512 CHRONIC PAIN OF BOTH SHOULDERS: ICD-10-CM

## 2025-02-12 DIAGNOSIS — Z87.39 HX OF GOUT: ICD-10-CM

## 2025-02-12 DIAGNOSIS — M19.90 GENERALIZED ARTHRITIS: ICD-10-CM

## 2025-02-12 DIAGNOSIS — M25.511 CHRONIC PAIN OF BOTH SHOULDERS: ICD-10-CM

## 2025-02-12 DIAGNOSIS — F41.9 ANXIETY: ICD-10-CM

## 2025-02-12 RX ORDER — LORAZEPAM 0.5 MG/1
0.5 TABLET ORAL
Qty: 28 TABLET | Refills: 0 | Status: SHIPPED | OUTPATIENT
Start: 2025-02-12

## 2025-02-12 RX ORDER — CYCLOBENZAPRINE HCL 10 MG
10 TABLET ORAL 3 TIMES DAILY PRN
Qty: 90 TABLET | Refills: 0 | Status: SHIPPED | OUTPATIENT
Start: 2025-02-12

## 2025-02-12 RX ORDER — CELECOXIB 100 MG/1
100-200 CAPSULE ORAL DAILY
Qty: 60 CAPSULE | Refills: 0 | Status: SHIPPED | OUTPATIENT
Start: 2025-02-12

## 2025-02-12 RX ORDER — ALLOPURINOL 300 MG/1
1 TABLET ORAL DAILY
Qty: 90 TABLET | Refills: 3 | Status: SHIPPED | OUTPATIENT
Start: 2025-02-12

## 2025-02-12 NOTE — TELEPHONE ENCOUNTER
Failed protocol    Lab Results   Component Value Date    WBC 5.9 12/20/2024    WBC 5.8 01/27/2020     Lab Results   Component Value Date    RBC 3.92 12/20/2024    RBC 4.23 01/27/2020     Lab Results   Component Value Date    HGB 10.6 12/20/2024    HGB 12.1 01/27/2020     Lab Results   Component Value Date    HCT 32.0 12/20/2024    HCT 35.1 01/27/2020     Lab Results   Component Value Date    MCV 82 12/20/2024    MCV 83 01/27/2020     Lab Results   Component Value Date    MCH 27.0 12/20/2024    MCH 28.6 01/27/2020     Lab Results   Component Value Date    MCHC 33.1 12/20/2024    MCHC 34.5 01/27/2020     Lab Results   Component Value Date    RDW 18.1 12/20/2024    RDW 12.4 01/27/2020     Lab Results   Component Value Date     12/20/2024     01/27/2020      Uric Acid   Date Value Ref Range Status   07/14/2023 3.4 2.4 - 5.7 mg/dL Final   01/27/2020 4.7 2.6 - 6.0 mg/dL Final        allopurinol (ZYLOPRIM) 300 MG tablet       Last Written Prescription Date:  6/10/24  Last Fill Quantity: 90,   # refills: 3  Last Office Visit: 12/20/24  Future Office visit:    Next 5 appointments (look out 90 days)      Mar 27, 2025 10:30 AM  (Arrive by 10:15 AM)  Return Visit with Nicolasa Wiley MD  [facility] ) [address] [facility] [address]  [phone]             Routing refill request to provider for review/approval because:      cyclobenzaprine (FLEXERIL) 10 MG tablet       Last Written Prescription Date:  11/19/24  Last Fill Quantity: 90,   # refills: 0  Last Office Visit: 12/20/24  Future Office visit:    Next 5 appointments (look out 90 days)      Mar 27, 2025 10:30 AM  (Arrive by 10:15 AM)  Return Visit with Nicolasa Wiley MD  [facility] ([facility] ) [address] [facility] [facility] [address]  [phone]             Routing refill request to provider for review/approval because:    LORazepam (ATIVAN) 0.5 MG tablet       Last Written  Prescription Date:  11/19/24  Last Fill Quantity: 28,   # refills: 0  Last Office Visit: 12/20/24  Future Office visit:    Next 5 appointments (look out 90 days)      Mar 27, 2025 10:30 AM  (Arrive by 10:15 AM)  Return Visit with Nicolasa Wiley MD  [facility] ([facility] ) [address] [facility] [address]  [phone]             Routing refill request to provider for review/approval because:      celecoxib (CELEBREX) 100 MG capsule       Last Written Prescription Date:  1/16/25  Last Fill Quantity: 60,   # refills: 0  Last Office Visit: 12/20/24  Future Office visit:    Next 5 appointments (look out 90 days)      Mar 27, 2025 10:30 AM  (Arrive by 10:15 AM)  Return Visit with Nicolasa Wiley MD  [facility] ([facility] ) [address] [address]  [phone]             Routing refill request to provider for review/approval because:

## 2025-03-15 DIAGNOSIS — E78.5 HYPERLIPIDEMIA LDL GOAL <100: ICD-10-CM

## 2025-03-15 DIAGNOSIS — K21.00 GASTROESOPHAGEAL REFLUX DISEASE WITH ESOPHAGITIS WITHOUT HEMORRHAGE: ICD-10-CM

## 2025-03-17 ENCOUNTER — MYC REFILL (OUTPATIENT)
Dept: FAMILY MEDICINE | Facility: OTHER | Age: 65
End: 2025-03-17

## 2025-03-17 DIAGNOSIS — M19.90 GENERALIZED ARTHRITIS: ICD-10-CM

## 2025-03-17 RX ORDER — CELECOXIB 100 MG/1
100-200 CAPSULE ORAL DAILY
Qty: 60 CAPSULE | Refills: 0 | Status: SHIPPED | OUTPATIENT
Start: 2025-03-17

## 2025-03-17 RX ORDER — OMEPRAZOLE 40 MG/1
40 CAPSULE, DELAYED RELEASE ORAL DAILY
Qty: 90 CAPSULE | Refills: 2 | Status: SHIPPED | OUTPATIENT
Start: 2025-03-17

## 2025-03-17 RX ORDER — ROSUVASTATIN CALCIUM 20 MG/1
20 TABLET, COATED ORAL DAILY
Qty: 90 TABLET | Refills: 0 | Status: SHIPPED | OUTPATIENT
Start: 2025-03-17

## 2025-03-17 NOTE — TELEPHONE ENCOUNTER
celecoxib (CELEBREX) 100 MG capsule       Last Written Prescription Date:  2/12/2025  Last Fill Quantity: 60,   # refills: 0  Last Office Visit: 12/20/2024  Future Office visit:    Next 5 appointments (look out 90 days)      Mar 27, 2025 10:30 AM  (Arrive by 10:15 AM)  Return Visit with Nicolasa Wiley MD  [facility] ([facility] ) [facility] [facility] [address]  [phone]

## 2025-03-17 NOTE — TELEPHONE ENCOUNTER
NSAID Medications Failed      Normal CBC on file in past 12 months        Recent Labs   Lab Test 12/20/24  1354   WBC 5.9   RBC 3.92   HGB 10.6*   HCT 32.0*              Always Fail Criteria - Chart Review Required    Validate Diagnosis. If the medication is requested for an acute flare of a chronic pain associated with a musculoskeletal or rheumatologic condition; okay to authorize if all other criteria are met. If not, then forward to provider for review.

## 2025-03-26 LAB
DAT POLY: NEGATIVE
SPECIMEN EXP DATE BLD: NORMAL

## 2025-03-27 ENCOUNTER — LAB (OUTPATIENT)
Dept: LAB | Facility: OTHER | Age: 65
End: 2025-03-27
Attending: INTERNAL MEDICINE
Payer: COMMERCIAL

## 2025-03-27 ENCOUNTER — ONCOLOGY VISIT (OUTPATIENT)
Dept: ONCOLOGY | Facility: OTHER | Age: 65
End: 2025-03-27
Attending: INTERNAL MEDICINE
Payer: COMMERCIAL

## 2025-03-27 VITALS
DIASTOLIC BLOOD PRESSURE: 70 MMHG | HEIGHT: 65 IN | OXYGEN SATURATION: 100 % | SYSTOLIC BLOOD PRESSURE: 102 MMHG | BODY MASS INDEX: 25.71 KG/M2 | TEMPERATURE: 97 F | WEIGHT: 154.32 LBS | HEART RATE: 75 BPM

## 2025-03-27 DIAGNOSIS — D50.0 IRON DEFICIENCY ANEMIA DUE TO CHRONIC BLOOD LOSS: Primary | ICD-10-CM

## 2025-03-27 DIAGNOSIS — D50.0 IRON DEFICIENCY ANEMIA DUE TO CHRONIC BLOOD LOSS: ICD-10-CM

## 2025-03-27 DIAGNOSIS — D64.9 ANEMIA, UNSPECIFIED TYPE: ICD-10-CM

## 2025-03-27 LAB
BASOPHILS # BLD AUTO: 0 10E3/UL (ref 0–0.2)
BASOPHILS NFR BLD AUTO: 1 %
EOSINOPHIL # BLD AUTO: 0.1 10E3/UL (ref 0–0.7)
EOSINOPHIL NFR BLD AUTO: 3 %
ERYTHROCYTE [DISTWIDTH] IN BLOOD BY AUTOMATED COUNT: 14.2 % (ref 10–15)
FERRITIN SERPL-MCNC: 48 NG/ML (ref 11–328)
HCT VFR BLD AUTO: 33.2 % (ref 35–47)
HGB BLD-MCNC: 11 G/DL (ref 11.7–15.7)
IMM GRANULOCYTES # BLD: 0 10E3/UL
IMM GRANULOCYTES NFR BLD: 0 %
IRON BINDING CAPACITY (ROCHE): 325 UG/DL (ref 240–430)
IRON SATN MFR SERPL: 18 % (ref 15–46)
IRON SERPL-MCNC: 57 UG/DL (ref 37–145)
LDH SERPL L TO P-CCNC: 251 U/L (ref 0–250)
LYMPHOCYTES # BLD AUTO: 1.4 10E3/UL (ref 0.8–5.3)
LYMPHOCYTES NFR BLD AUTO: 30 %
MCH RBC QN AUTO: 27.8 PG (ref 26.5–33)
MCHC RBC AUTO-ENTMCNC: 33.1 G/DL (ref 31.5–36.5)
MCV RBC AUTO: 84 FL (ref 78–100)
MONOCYTES # BLD AUTO: 0.5 10E3/UL (ref 0–1.3)
MONOCYTES NFR BLD AUTO: 11 %
NEUTROPHILS # BLD AUTO: 2.5 10E3/UL (ref 1.6–8.3)
NEUTROPHILS NFR BLD AUTO: 55 %
NRBC # BLD AUTO: 0 10E3/UL
NRBC BLD AUTO-RTO: 0 /100
PLATELET # BLD AUTO: 340 10E3/UL (ref 150–450)
RBC # BLD AUTO: 3.96 10E6/UL (ref 3.8–5.2)
RETICS # AUTO: 0.07 10E6/UL (ref 0.03–0.1)
RETICS/RBC NFR AUTO: 1.8 % (ref 0.5–2)
WBC # BLD AUTO: 4.5 10E3/UL (ref 4–11)

## 2025-03-27 RX ORDER — LANOLIN ALCOHOL/MO/W.PET/CERES
1000 CREAM (GRAM) TOPICAL DAILY
Qty: 90 TABLET | Refills: 1 | Status: SHIPPED | OUTPATIENT
Start: 2025-03-27

## 2025-03-27 ASSESSMENT — PAIN SCALES - GENERAL: PAINLEVEL_OUTOF10: NO PAIN (0)

## 2025-03-27 NOTE — Clinical Note
Can you let her know that I sent her vitamin B12 prescription recommend she take it.  Forgot to tell her to do so in her appointment.  This is because the level is high.

## 2025-03-27 NOTE — NURSING NOTE
"Oncology Rooming Note    March 27, 2025 10:11 AM   Elsi Krause is a 64 year old female who presents for:    Chief Complaint   Patient presents with    Hematology     Follow up. Iron deficiency anemia due to chronic blood loss     Initial Vitals: /70 (BP Location: Right arm, Patient Position: Sitting, Cuff Size: Adult Regular)   Pulse 75   Temp 97  F (36.1  C) (Tympanic)   Ht 1.651 m (5' 5\")   Wt 70 kg (154 lb 5.2 oz)   SpO2 100%   BMI 25.68 kg/m   Estimated body mass index is 25.68 kg/m  as calculated from the following:    Height as of this encounter: 1.651 m (5' 5\").    Weight as of this encounter: 70 kg (154 lb 5.2 oz). Body surface area is 1.79 meters squared.  No Pain (0) Comment: Data Unavailable   No LMP recorded. Patient is postmenopausal.  Allergies reviewed: Yes  Medications reviewed: Yes    Medications: Medication refills not needed today.  Pharmacy name entered into [facility]:    [facility] #[id number] - [place], [place] - [address]  AT [facility] OF  [address]  [facility] - [place], [place] - [address]     Frailty Screening:   Is the patient here for a new oncology consult visit in cancer care? 2. No    PHQ9:  Did this patient require a PHQ9?: No      Clinical concerns: none       Sade Schwab LPN              "

## 2025-03-27 NOTE — PROGRESS NOTES
HEMATOLOGY FOLLOW-UP NOTE  Mar 27, 2025    Reason for follow-up: Iron deficiency anemia    HISTORY OF PRESENT ILLNESS  Elsi Krause is a 64 year old female with PMH as stated below who was seen in the hematology clinic for anemia    Her history in short is as follows:    He was not show progressive anemia starting from 2/22/2024.  At that time found to have a hemoglobin of 11.4 with MCV of 81.  Ferritin checked on 5/2/2024 was 45 with an iron saturation of 12 however it has decreased to a ferritin of 8 on 9/11/2024 with an iron saturation of 4.    She was started on iron replacement in May 2024 but was not able to tolerate it.  She  has been feeling very fatigued, cold, dizzy.  Denies any shortness of breath or chest pain.  Did require 1 unit of PRBC on 9/11/2024.  At that time her hemoglobin was 7.8.  Denies any blood in stools, change in color stools, or blood in urine.  Denies any abdominal pain, weight loss or appetite loss.    7/12/2024: CT abdomen pelvis with contrast:IMPRESSION: No intraperitoneal masses or inflammatory changes.     Her last colonoscopy was on 3/23/2023.  At that time she was found to have mild left-sided diverticulosis.  Plan to repeat colonoscopy in 5 years.  She had a EGD done on 10/22/2024 which did not show any source of bleeding.    Interim history:    She is doing well.  Denies any blood in stools.  Denies any blood in urine.  Continues to feel tired.  REVIEW OF SYSTEMS  A 12-point ROS negative except as in HPI      Current Outpatient Medications   Medication Sig Dispense Refill    allopurinol (ZYLOPRIM) 300 MG tablet Take 1 tablet (300 mg) by mouth daily. 90 tablet 3    celecoxib (CELEBREX) 100 MG capsule Take 1-2 capsules (100-200 mg) by mouth daily. 60 capsule 0    colchicine (MITIGARE) 0.6 MG capsule Take 0.6 mg by mouth daily.      cyclobenzaprine (FLEXERIL) 10 MG tablet Take 1 tablet (10 mg) by mouth 3 times daily as needed for muscle spasms. 90 tablet 0     lisinopril-hydrochlorothiazide (ZESTORETIC) 20-25 MG tablet Take 1 tablet by mouth once daily 90 tablet 3    LORazepam (ATIVAN) 0.5 MG tablet Take 1 tablet (0.5 mg) by mouth nightly as needed for anxiety. 28 tablet 0    omeprazole (PRILOSEC) 40 MG DR capsule Take 1 capsule by mouth once daily 90 capsule 2    rosuvastatin (CRESTOR) 20 MG tablet Take 1 tablet by mouth once daily 90 tablet 0       Allergies   Allergen Reactions    Indomethacin Nausea and Vomiting     Immunization History   Administered Date(s) Administered    COVID-19 Vaccine (Tricia) 03/09/2021, 12/09/2021    Influenza Vaccine >6 months,quad, PF 11/16/2020    TDAP Vaccine (Adacel) 02/02/2011    TDAP Vaccine (Boostrix) 02/02/2011       Past Medical History:   Diagnosis Date    ACP (advance care planning) 07/19/2016    Advance Care Planning 7/19/2016: ACP Review of Chart / Resources Provided:  Reviewed chart for advance care plan.  Elsi Krause has no plan or code status on file. Discussed available resources and provided with information. Confirmed code status reflects current choices pending further ACP discussions.  Confirmed/documented legally designated decision makers.  Added by CASTILLO REYNA       Degenerative disc disease, lumbar     Depression     Drug overdose 04/20/2015    Hyperlipidemia     Hypertension     Impaired fasting glucose 04/04/2012    Leiomyoma of uterus, unspecified 09/19/2006    IMO Update 10/11    Osteoarthritis of ankle 08/22/2006    IMO Update 10/11    Urinary tract infection, site not specified 07/22/2002    Also 9/11/01, 11/18/04 IMO Update 10/11       Past Surgical History:   Procedure Laterality Date    CARPAL TUNNEL RELEASE RT/LT Right     CERVICAL FUSION      COLONOSCOPY N/A 3/23/2023    Procedure: COLONOSCOPY;  Surgeon: Jose Angel Fiore MD;  Location: [facility]    ENDOMETRIAL SAMPLING (BIOPSY)  12/27/2001    ESOPHAGOSCOPY, GASTROSCOPY, DUODENOSCOPY (EGD), COMBINED N/A 10/22/2024    Procedure: ESOPHAGOGASTRODUODENOSCOPY  "with biopsy;  Surgeon: Paddy Hernandez MD;  Location: [facility] OR    FOOT SURGERY Bilateral 01/01/1973    HC EXCISE CUTANEOUS NEUROMA  11/05/1997    Times 3     TUBAL LIGATION Bilateral 04/01/1989       SOCIAL HISTORY  History   Smoking Status    Former   Smokeless Tobacco    Never    Social History    Substance and Sexual Activity      Alcohol use: Yes        Comment: Social     History   Drug Use No       FAMILY HISTORY  Family History   Problem Relation Age of Onset    Diabetes Mother     Hypertension Mother     Hyperlipidemia Mother     Diabetes Father     Coronary Artery Disease Father     Hypertension Father     Hyperlipidemia Father     Chronic Obstructive Pulmonary Disease Father     Cancer - colorectal Father     Prostate Cancer Paternal Grandfather     Diabetes Sister     Hypertension Sister     Hyperlipidemia Sister     Neurologic Disorder Son         Cerebral palsy       PHYSICAL EXAMINATION  /70 (BP Location: Right arm, Patient Position: Sitting, Cuff Size: Adult Regular)   Pulse 75   Temp 97  F (36.1  C) (Tympanic)   Ht 1.651 m (5' 5\")   Wt 70 kg (154 lb 5.2 oz)   SpO2 100%   BMI 25.68 kg/m    Wt Readings from Last 2 Encounters:   03/27/25 70 kg (154 lb 5.2 oz)   12/26/24 70.1 kg (154 lb 8.7 oz)     Physical Exam  Constitutional:       Appearance: Normal appearance.   Pulmonary:      Effort: Pulmonary effort is normal.   Skin:     Coloration: Skin is pale.   Neurological:      General: No focal deficit present.      Mental Status: She is alert and oriented to person, place, and time.     Laboratory and imaging:     Latest Reference Range & Units 03/27/25 09:47   WBC 4.0 - 11.0 10e3/uL 4.5   Hemoglobin 11.7 - 15.7 g/dL 11.0 (L)   Hematocrit 35.0 - 47.0 % 33.2 (L)   Platelet Count 150 - 450 10e3/uL 340   (L): Data is abnormally low  ASSESSMENT AND PLAN    1.  Iron deficiency anemia:    Found to have progressive anemia since February 2024.  She was then found to have a hemoglobin of 7.8 on " 9/11/2024.  Required 1 unit PRBC.  She was also found to become progressively more iron deficient as her ferritin went from 45 in May 24 to 8 in September 2024.  She did try oral iron in the past however was not able to tolerate it.   Received iron infusion with Venofer 200 mg X5 which she completed in November 2024.  A  She then received her iron infusion in November 2024.     Her hemoglobin today on 3/27/2025 is 11 with a ferritin of 48 with iron saturation of 18.  Folic acid when checked on 9/11/2024 was 15.8 and vitamin B12 level was 378.  Methylmalonic acid was mildly elevated at 0.63 and intrinsic antibody level was negative.  Also recommend she take vitamin B12 supplementation.    Her anemia is very likely from her iron deficiency.  However has not normalized despite having relatively adequate iron stores.  Complete the workup will also check SPEP HENRY and kappa lambda light chain, LDH, haptoglobin and reticulocyte count.  We can do this with her next blood draw.    Till date she has had an EGD done 10/2024 which was negative.  Colonoscopy done 3/2023 showed left-sided diverticulosis but no source of bleeding.  CT abdomen pelvis 10/7/2024 was also negative for any masses.      We will see her back in 3 months with repeat CBCD, CMP, iron panel, ferritin, vitamin B12, LDH, haptoglobin, reticulocyte count, SPEP, HENRY, kappa lambda light chain    Total time spent on the patient on day of encounter was 30 minutes doing chart review, review of test results, interpretation of results, patient visit and documentation.       Nicolasa Wiley MD

## 2025-03-28 LAB — HAPTOGLOB SERPL-MCNC: 253 MG/DL (ref 30–200)

## 2025-04-05 ENCOUNTER — TRANSFERRED RECORDS (OUTPATIENT)
Dept: MULTI SPECIALTY CLINIC | Facility: CLINIC | Age: 65
End: 2025-04-05

## 2025-04-05 LAB — RETINOPATHY: NORMAL

## 2025-04-16 ENCOUNTER — MYC REFILL (OUTPATIENT)
Dept: FAMILY MEDICINE | Facility: OTHER | Age: 65
End: 2025-04-16

## 2025-04-16 DIAGNOSIS — M19.90 GENERALIZED ARTHRITIS: ICD-10-CM

## 2025-04-16 RX ORDER — CELECOXIB 100 MG/1
100-200 CAPSULE ORAL DAILY
Qty: 60 CAPSULE | Refills: 0 | Status: SHIPPED | OUTPATIENT
Start: 2025-04-16

## 2025-04-16 NOTE — TELEPHONE ENCOUNTER
celecoxib (CELEBREX) 100 MG capsule       Last Written Prescription Date:  3/17/25  Last Fill Quantity: 60,   # refills: 0  Last Office Visit: 12/20/24  Future Office visit:    Next 5 appointments (look out 90 days)      Jun 26, 2025 10:30 AM  (Arrive by 10:15 AM)  Return Visit with Nicolasa Wiley MD  [facility] ([facility] ) [address] [place] [address]  [phone]             Routing refill request to provider for review/approval because:  NSAID Medications Failed      Normal CBC on file in past 12 months        Recent Labs   Lab Test 03/27/25  0947   WBC 4.5   RBC 3.96   HGB 11.0*   HCT 33.2*

## 2025-04-20 ENCOUNTER — HEALTH MAINTENANCE LETTER (OUTPATIENT)
Age: 65
End: 2025-04-20

## 2025-05-13 ENCOUNTER — MYC REFILL (OUTPATIENT)
Dept: FAMILY MEDICINE | Facility: OTHER | Age: 65
End: 2025-05-13

## 2025-05-13 DIAGNOSIS — M19.90 GENERALIZED ARTHRITIS: ICD-10-CM

## 2025-05-13 RX ORDER — CELECOXIB 100 MG/1
100-200 CAPSULE ORAL DAILY
Qty: 60 CAPSULE | Refills: 0 | Status: SHIPPED | OUTPATIENT
Start: 2025-05-13

## 2025-05-13 NOTE — TELEPHONE ENCOUNTER
celecoxib (CELEBREX) 100 MG capsule         Last Written Prescription Date:  4/16/25  Last Fill Quantity: 60,   # refills: 0  Last Office Visit: 12/20/24  Future Office visit:    Next 5 appointments (look out 90 days)      Jun 26, 2025 10:30 AM  (Arrive by 10:15 AM)  Return Visit with Nicolasa Wiley MD  [facility] ([facility] ) [address] [place] [id number]  [phone]             Routing refill request to provider for review/approval because:  NSAID Medications Failed      Normal CBC on file in past 12 months        Recent Labs   Lab Test 03/27/25  0947   WBC 4.5   RBC 3.96   HGB 11.0*   HCT 33.2*

## 2025-06-01 DIAGNOSIS — I10 BENIGN ESSENTIAL HYPERTENSION: ICD-10-CM

## 2025-06-02 RX ORDER — LISINOPRIL AND HYDROCHLOROTHIAZIDE 20; 25 MG/1; MG/1
1 TABLET ORAL DAILY
Qty: 90 TABLET | Refills: 2 | Status: SHIPPED | OUTPATIENT
Start: 2025-06-02

## 2025-06-12 DIAGNOSIS — E78.5 HYPERLIPIDEMIA LDL GOAL <100: ICD-10-CM

## 2025-06-12 RX ORDER — ROSUVASTATIN CALCIUM 20 MG/1
20 TABLET, COATED ORAL DAILY
Qty: 90 TABLET | Refills: 1 | Status: SHIPPED | OUTPATIENT
Start: 2025-06-12

## 2025-06-17 ENCOUNTER — MYC REFILL (OUTPATIENT)
Dept: FAMILY MEDICINE | Facility: OTHER | Age: 65
End: 2025-06-17

## 2025-06-17 DIAGNOSIS — F41.9 ANXIETY: ICD-10-CM

## 2025-06-17 DIAGNOSIS — M19.90 GENERALIZED ARTHRITIS: ICD-10-CM

## 2025-06-17 RX ORDER — LORAZEPAM 0.5 MG/1
0.5 TABLET ORAL
Qty: 28 TABLET | Refills: 0 | Status: SHIPPED | OUTPATIENT
Start: 2025-06-17

## 2025-06-17 RX ORDER — CELECOXIB 100 MG/1
100-200 CAPSULE ORAL DAILY
Qty: 60 CAPSULE | Refills: 0 | Status: SHIPPED | OUTPATIENT
Start: 2025-06-17

## 2025-06-17 NOTE — TELEPHONE ENCOUNTER
celecoxib (CELEBREX) 100 MG capsule       Last Written Prescription Date:  5/13/25  Last Fill Quantity: 60,   # refills: 0  Last Office Visit: 12/20/24  Future Office visit:    Next 5 appointments (look out 90 days)      Jun 26, 2025 10:50 AM  (Arrive by 10:35 AM)  Return Visit with Yue Dumont NP  [facility] ) [address] [facility] [address]  [phone]             Routing refill request to provider for review/approval because:  Drug not on the G, [facility] or  Health refill protocol or controlled substance          LORazepam (ATIVAN) 0.5 MG tablet         Last Written Prescription Date:  2/12/25  Last Fill Quantity: 28,   # refills: 0  Last Office Visit: 12/20/24  Future Office visit:    Next 5 appointments (look out 90 days)      Jun 26, 2025 10:50 AM  (Arrive by 10:35 AM)  Return Visit with Yue Dumont NP  [facility] ) [address]  [facility] [address]  [phone]             Routing refill request to provider for review/approval because:  Drug not on the [facility], [facility] or [facility] refill protocol or controlled substance

## 2025-06-18 ENCOUNTER — APPOINTMENT (OUTPATIENT)
Dept: CT IMAGING | Facility: HOSPITAL | Age: 65
End: 2025-06-18
Attending: PHYSICIAN ASSISTANT
Payer: COMMERCIAL

## 2025-06-18 ENCOUNTER — HOSPITAL ENCOUNTER (EMERGENCY)
Facility: HOSPITAL | Age: 65
Discharge: HOME | End: 2025-06-18
Attending: PHYSICIAN ASSISTANT
Payer: COMMERCIAL

## 2025-06-18 ENCOUNTER — NURSE TRIAGE (OUTPATIENT)
Dept: CARE COORDINATION | Facility: OTHER | Age: 65
End: 2025-06-18

## 2025-06-18 ENCOUNTER — MYC MEDICAL ADVICE (OUTPATIENT)
Dept: FAMILY MEDICINE | Facility: OTHER | Age: 65
End: 2025-06-18

## 2025-06-18 ENCOUNTER — APPOINTMENT (OUTPATIENT)
Dept: GENERAL RADIOLOGY | Facility: HOSPITAL | Age: 65
End: 2025-06-18
Attending: PHYSICIAN ASSISTANT
Payer: COMMERCIAL

## 2025-06-18 VITALS
OXYGEN SATURATION: 94 % | HEART RATE: 79 BPM | DIASTOLIC BLOOD PRESSURE: 75 MMHG | WEIGHT: 160.83 LBS | BODY MASS INDEX: 26.76 KG/M2 | RESPIRATION RATE: 18 BRPM | SYSTOLIC BLOOD PRESSURE: 137 MMHG | TEMPERATURE: 97.6 F

## 2025-06-18 DIAGNOSIS — R00.2 PALPITATIONS: ICD-10-CM

## 2025-06-18 DIAGNOSIS — E83.42 HYPOMAGNESEMIA: ICD-10-CM

## 2025-06-18 DIAGNOSIS — R06.00 DYSPNEA: ICD-10-CM

## 2025-06-18 LAB
ALBUMIN SERPL BCG-MCNC: 4.3 G/DL (ref 3.5–5.2)
ALP SERPL-CCNC: 103 U/L (ref 40–150)
ALT SERPL W P-5'-P-CCNC: 26 U/L (ref 0–50)
ANION GAP SERPL CALCULATED.3IONS-SCNC: 12 MMOL/L (ref 7–15)
AST SERPL W P-5'-P-CCNC: 31 U/L (ref 0–45)
ATRIAL RATE - MUSE: 77 BPM
BASOPHILS # BLD AUTO: 0 10E3/UL (ref 0–0.2)
BASOPHILS NFR BLD AUTO: 1 %
BILIRUB SERPL-MCNC: 0.4 MG/DL
BUN SERPL-MCNC: 25.8 MG/DL (ref 8–23)
CALCIUM SERPL-MCNC: 9.7 MG/DL (ref 8.8–10.4)
CHLORIDE SERPL-SCNC: 99 MMOL/L (ref 98–107)
CREAT SERPL-MCNC: 1.05 MG/DL (ref 0.51–0.95)
D DIMER PPP FEU-MCNC: 0.55 UG/ML FEU (ref 0–0.5)
DIASTOLIC BLOOD PRESSURE - MUSE: NORMAL MMHG
EGFRCR SERPLBLD CKD-EPI 2021: 59 ML/MIN/1.73M2
EOSINOPHIL # BLD AUTO: 0.1 10E3/UL (ref 0–0.7)
EOSINOPHIL NFR BLD AUTO: 2 %
ERYTHROCYTE [DISTWIDTH] IN BLOOD BY AUTOMATED COUNT: 13.5 % (ref 10–15)
GLUCOSE SERPL-MCNC: 125 MG/DL (ref 70–99)
HCO3 SERPL-SCNC: 23 MMOL/L (ref 22–29)
HCT VFR BLD AUTO: 31.6 % (ref 35–47)
HGB BLD-MCNC: 10.6 G/DL (ref 11.7–15.7)
IMM GRANULOCYTES # BLD: 0 10E3/UL
IMM GRANULOCYTES NFR BLD: 0 %
INTERPRETATION ECG - MUSE: NORMAL
LYMPHOCYTES # BLD AUTO: 1.9 10E3/UL (ref 0.8–5.3)
LYMPHOCYTES NFR BLD AUTO: 32 %
MAGNESIUM SERPL-MCNC: 1.6 MG/DL (ref 1.7–2.3)
MCH RBC QN AUTO: 27 PG (ref 26.5–33)
MCHC RBC AUTO-ENTMCNC: 33.5 G/DL (ref 31.5–36.5)
MCV RBC AUTO: 81 FL (ref 78–100)
MONOCYTES # BLD AUTO: 0.7 10E3/UL (ref 0–1.3)
MONOCYTES NFR BLD AUTO: 11 %
NEUTROPHILS # BLD AUTO: 3.2 10E3/UL (ref 1.6–8.3)
NEUTROPHILS NFR BLD AUTO: 54 %
NRBC # BLD AUTO: 0 10E3/UL
NRBC BLD AUTO-RTO: 0 /100
NT-PROBNP SERPL-MCNC: 177 PG/ML (ref 0–226)
P AXIS - MUSE: 69 DEGREES
PLATELET # BLD AUTO: 326 10E3/UL (ref 150–450)
POTASSIUM SERPL-SCNC: 4.1 MMOL/L (ref 3.4–5.3)
PR INTERVAL - MUSE: 276 MS
PROT SERPL-MCNC: 7.3 G/DL (ref 6.4–8.3)
QRS DURATION - MUSE: 92 MS
QT - MUSE: 420 MS
QTC - MUSE: 475 MS
R AXIS - MUSE: 45 DEGREES
RBC # BLD AUTO: 3.92 10E6/UL (ref 3.8–5.2)
SODIUM SERPL-SCNC: 134 MMOL/L (ref 135–145)
SYSTOLIC BLOOD PRESSURE - MUSE: NORMAL MMHG
T AXIS - MUSE: 36 DEGREES
TROPONIN T SERPL HS-MCNC: 12 NG/L
TSH SERPL DL<=0.005 MIU/L-ACNC: 1.06 UIU/ML (ref 0.3–4.2)
VENTRICULAR RATE- MUSE: 77 BPM
WBC # BLD AUTO: 5.9 10E3/UL (ref 4–11)

## 2025-06-18 PROCEDURE — 93005 ELECTROCARDIOGRAM TRACING: CPT

## 2025-06-18 PROCEDURE — 71275 CT ANGIOGRAPHY CHEST: CPT | Mod: 26 | Performed by: RADIOLOGY

## 2025-06-18 PROCEDURE — 93010 ELECTROCARDIOGRAM REPORT: CPT | Performed by: INTERNAL MEDICINE

## 2025-06-18 PROCEDURE — 258N000003 HC RX IP 258 OP 636: Performed by: PHYSICIAN ASSISTANT

## 2025-06-18 PROCEDURE — 96365 THER/PROPH/DIAG IV INF INIT: CPT | Mod: XU

## 2025-06-18 PROCEDURE — 83735 ASSAY OF MAGNESIUM: CPT | Performed by: PHYSICIAN ASSISTANT

## 2025-06-18 PROCEDURE — 85004 AUTOMATED DIFF WBC COUNT: CPT | Performed by: PHYSICIAN ASSISTANT

## 2025-06-18 PROCEDURE — 84443 ASSAY THYROID STIM HORMONE: CPT | Performed by: PHYSICIAN ASSISTANT

## 2025-06-18 PROCEDURE — 99284 EMERGENCY DEPT VISIT MOD MDM: CPT | Performed by: PHYSICIAN ASSISTANT

## 2025-06-18 PROCEDURE — 83880 ASSAY OF NATRIURETIC PEPTIDE: CPT | Performed by: PHYSICIAN ASSISTANT

## 2025-06-18 PROCEDURE — 85379 FIBRIN DEGRADATION QUANT: CPT | Performed by: PHYSICIAN ASSISTANT

## 2025-06-18 PROCEDURE — 71275 CT ANGIOGRAPHY CHEST: CPT

## 2025-06-18 PROCEDURE — 84155 ASSAY OF PROTEIN SERUM: CPT | Performed by: PHYSICIAN ASSISTANT

## 2025-06-18 PROCEDURE — 96361 HYDRATE IV INFUSION ADD-ON: CPT

## 2025-06-18 PROCEDURE — 250N000011 HC RX IP 250 OP 636: Performed by: PHYSICIAN ASSISTANT

## 2025-06-18 PROCEDURE — 250N000011 HC RX IP 250 OP 636: Performed by: RADIOLOGY

## 2025-06-18 PROCEDURE — 71046 X-RAY EXAM CHEST 2 VIEWS: CPT | Mod: 26 | Performed by: STUDENT IN AN ORGANIZED HEALTH CARE EDUCATION/TRAINING PROGRAM

## 2025-06-18 PROCEDURE — 36415 COLL VENOUS BLD VENIPUNCTURE: CPT | Performed by: PHYSICIAN ASSISTANT

## 2025-06-18 PROCEDURE — 84484 ASSAY OF TROPONIN QUANT: CPT | Performed by: PHYSICIAN ASSISTANT

## 2025-06-18 PROCEDURE — 71046 X-RAY EXAM CHEST 2 VIEWS: CPT

## 2025-06-18 PROCEDURE — 99285 EMERGENCY DEPT VISIT HI MDM: CPT | Mod: 25

## 2025-06-18 RX ORDER — IOPAMIDOL 755 MG/ML
58 INJECTION, SOLUTION INTRAVASCULAR ONCE
Status: COMPLETED | OUTPATIENT
Start: 2025-06-18 | End: 2025-06-18

## 2025-06-18 RX ORDER — MAGNESIUM SULFATE HEPTAHYDRATE 40 MG/ML
2 INJECTION, SOLUTION INTRAVENOUS ONCE
Status: COMPLETED | OUTPATIENT
Start: 2025-06-18 | End: 2025-06-18

## 2025-06-18 RX ADMIN — MAGNESIUM SULFATE HEPTAHYDRATE 2 G: 40 INJECTION, SOLUTION INTRAVENOUS at 12:21

## 2025-06-18 RX ADMIN — IOPAMIDOL 58 ML: 755 INJECTION, SOLUTION INTRAVENOUS at 13:28

## 2025-06-18 RX ADMIN — SODIUM CHLORIDE, SODIUM LACTATE, POTASSIUM CHLORIDE, AND CALCIUM CHLORIDE 1000 ML: .6; .31; .03; .02 INJECTION, SOLUTION INTRAVENOUS at 11:43

## 2025-06-18 ASSESSMENT — COLUMBIA-SUICIDE SEVERITY RATING SCALE - C-SSRS
2. HAVE YOU ACTUALLY HAD ANY THOUGHTS OF KILLING YOURSELF IN THE PAST MONTH?: NO
1. IN THE PAST MONTH, HAVE YOU WISHED YOU WERE DEAD OR WISHED YOU COULD GO TO SLEEP AND NOT WAKE UP?: NO
6. HAVE YOU EVER DONE ANYTHING, STARTED TO DO ANYTHING, OR PREPARED TO DO ANYTHING TO END YOUR LIFE?: YES

## 2025-06-18 ASSESSMENT — ENCOUNTER SYMPTOMS
COUGH: 0
SHORTNESS OF BREATH: 1
DIARRHEA: 1
PALPITATIONS: 1
HEADACHES: 0
FEVER: 0
CHILLS: 0
BRUISES/BLEEDS EASILY: 0
DIZZINESS: 1
WHEEZING: 0
BACK PAIN: 0
ABDOMINAL PAIN: 0
FATIGUE: 1

## 2025-06-18 ASSESSMENT — ACTIVITIES OF DAILY LIVING (ADL)
ADLS_ACUITY_SCORE: 41

## 2025-06-18 NOTE — DISCHARGE INSTRUCTIONS
Please follow-up in the clinic for recheck and to discuss further imaging recommendations on a nonurgent basis.    Stay hydrated.    Please return to this emergency department for any new or worsening symptoms or other questions or concerns.

## 2025-06-18 NOTE — TELEPHONE ENCOUNTER
Patient reports Monday evening she started having shortness of breath, dizziness, and heart palpitations. These symptoms have been constant since then. Felt like she was going to pass out but did not. Reports her symptoms are mild as she still has been able to work, but they have been constant and bothersome.   Recommended evaluation in ER per protocol. Can call for ER follow up after. Patient verbalized understanding and plans to go to ER.       Reason for Disposition   Extra heartbeats, irregular heart beating, or heart is beating very fast (i.e., 'palpitations')    Additional Information   Negative: SEVERE difficulty breathing (e.g., struggling for each breath, speaks in single words, pulse > 120)   Negative: Breathing stopped and hasn't returned   Negative: Choking on something   Negative: Bluish (or gray) lips or face   Negative: Difficult to awaken or acting confused (e.g., disoriented, slurred speech)   Negative: Passed out (e.g., fainted, lost consciousness, blacked out and was not responding)   Negative: Wheezing started suddenly after medicine, an allergic food, or bee sting   Negative: Stridor (harsh sound while breathing in)   Negative: Slow, shallow and weak breathing   Negative: Sounds like a life-threatening emergency to the triager   Negative: Chest pain   Negative: Wheezing (high pitched whistling sound) and previous asthma attacks or use of asthma medicines   Negative: Breathing difficulty and within 14 days of COVID-19 EXPOSURE (close contact) with someone diagnosed with COVID-19 (e.g., COVID test positive)   Negative: Breathing difficulty and COVID-19 is widespread in the community   Negative: Breathing diffculty and only present when coughing   Negative: Breathing difficulty and only from stuffy nose   Negative: Breathing diffculty and only from stuffy nose or runny nose from common cold   Negative: MODERATE difficulty breathing (e.g., speaks in phrases, SOB even at rest, pulse 100-120) of  "new-onset or worse than normal   Negative: Wheezing can be heard across the room   Negative: Drooling or spitting out saliva (because can't swallow)   Negative: Any history of prior \"blood clot\" in leg or lungs   Negative: Illness requiring prolonged bedrest in past month (e.g., immobilization, long hospital stay)   Negative: Hip or leg fracture (broken bone) in past month (or had cast on leg or ankle in past month)   Negative: Major surgery in the past month    Answer Assessment - Initial Assessment Questions  1. RESPIRATORY STATUS: \"Describe your breathing?\" (e.g., wheezing, shortness of breath, unable to speak, severe coughing)       Shortness of breath   2. ONSET: \"When did this breathing problem begin?\"       Monday night she noticed it.   3. PATTERN \"Does the difficult breathing come and go, or has it been constant since it started?\"       Constant   4. SEVERITY: \"How bad is your breathing?\" (e.g., mild, moderate, severe)       Mild   5. RECURRENT SYMPTOM: \"Have you had difficulty breathing before?\" If Yes, ask: \"When was the last time?\" and \"What happened that time?\"       Yes had it happen when her hemoglobin and iron were low in the past- about a year ago   6. CARDIAC HISTORY: \"Do you have any history of heart disease?\" (e.g., heart attack, angina, bypass surgery, angioplasty)       No   7. LUNG HISTORY: \"Do you have any history of lung disease?\"  (e.g., pulmonary embolus, asthma, emphysema)      No   8. CAUSE: \"What do you think is causing the breathing problem?\"       Unknown   9. OTHER SYMPTOMS: \"Do you have any other symptoms?\" (e.g., chest pain, cough, dizziness, fever, runny nose)      Dizziness, heart palpitations   10. O2 SATURATION MONITOR:  \"Do you use an oxygen saturation monitor (pulse oximeter) at home?\" If Yes, ask: \"What is your reading (oxygen level) today?\" \"What is your usual oxygen saturation reading?\" (e.g., 95%)        Cannot check   11. PREGNANCY: \"Is there any chance you are " "pregnant?\" \"When was your last menstrual period?\"        No   12. TRAVEL: \"Have you traveled out of the country in the last month?\" (e.g., travel history, exposures)        No    Protocols used: Breathing Difficulty-A-[place]    "

## 2025-06-18 NOTE — ED TRIAGE NOTES
"Patient presents with c/o dizziness, heart pounding, SOB, lightheadedness which all started Monday night. Patient denies any current pain. Patient reports feeling similar the last time her \"blood was low\" - low hemoglobin and iron. Denies any recent fevers or chills.         "

## 2025-06-18 NOTE — ED PROVIDER NOTES
History     Chief Complaint   Patient presents with    Shortness of Breath     The history is provided by the patient.     Elsi Krause is a 64 year old female who presented to the emergency department ambulatory for evaluation of approximately 2 days of intermittent dizziness, shortness of breath, and palpitations.  Patient reports that she did have approximate 1 day history of diarrhea and believes she may be dehydrated.  She also is a history of anxiety.  Denies any lateral leg swelling.  Denies any orthopnea or PND.  Denies any hemoptysis.  Denies any history of coronary artery disease.  Denies any syncopal event.  Denies any fevers or chills.  Denies recent travel.  Denies any recent medication changes.    Allergies:  Allergies   Allergen Reactions    Indomethacin Nausea and Vomiting       Problem List:    Patient Active Problem List    Diagnosis Date Noted    Iron deficiency anemia due to chronic blood loss 09/26/2024     Priority: Medium    Anemia 09/12/2024     Priority: Medium    Diabetes mellitus, type 2 (H) 10/02/2023     Priority: Medium    Hx of gout 03/13/2023     Priority: Medium    Gastroesophageal reflux disease with esophagitis without hemorrhage 03/13/2023     Priority: Medium    Hyperlipidemia LDL goal <100 03/13/2023     Priority: Medium    Recurrent major depressive disorder, in partial remission 03/13/2023     Priority: Medium    Anxiety 03/13/2023     Priority: Medium    Status post cervical spinal fusion 09/28/2017     Priority: Medium    Drug overdose, multiple drugs 04/20/2015     Priority: Medium    Cervical radicular pain 08/27/2014     Priority: Medium    Osteoarthritis of both feet 12/28/2011     Priority: Medium    Essential hypertension 02/07/2003     Priority: Medium     IMO Update          Past Medical History:    Past Medical History:   Diagnosis Date    ACP (advance care planning) 07/19/2016    Degenerative disc disease, lumbar     Depression     Drug overdose 04/20/2015     Hyperlipidemia     Hypertension     Impaired fasting glucose 04/04/2012    Leiomyoma of uterus, unspecified 09/19/2006    Osteoarthritis of ankle 08/22/2006    Urinary tract infection, site not specified 07/22/2002       Past Surgical History:    Past Surgical History:   Procedure Laterality Date    CARPAL TUNNEL RELEASE RT/LT Right     CERVICAL FUSION      COLONOSCOPY N/A 3/23/2023    Procedure: COLONOSCOPY;  Surgeon: Jose Angel Fiore MD;  Location: [facility] OR    ENDOMETRIAL SAMPLING (BIOPSY)  12/27/2001    ESOPHAGOSCOPY, GASTROSCOPY, DUODENOSCOPY (EGD), COMBINED N/A 10/22/2024    Procedure: ESOPHAGOGASTRODUODENOSCOPY with biopsy;  Surgeon: Paddy Hernandez MD;  Location: [facility] OR    FOOT SURGERY Bilateral 01/01/1973    HC EXCISE CUTANEOUS NEUROMA  11/05/1997    Times 3     TUBAL LIGATION Bilateral 04/01/1989       Family History:    Family History   Problem Relation Age of Onset    Diabetes Mother     Hypertension Mother     Hyperlipidemia Mother     Diabetes Father     Coronary Artery Disease Father     Hypertension Father     Hyperlipidemia Father     Chronic Obstructive Pulmonary Disease Father     Cancer - colorectal Father     Prostate Cancer Paternal Grandfather     Diabetes Sister     Hypertension Sister     Hyperlipidemia Sister     Neurologic Disorder Son         Cerebral palsy       Social History:  Marital Status:  Single [1]  Social History     Tobacco Use    Smoking status: Some Days     Types: Vaping Device     Passive exposure: Past    Smokeless tobacco: Never   Vaping Use    Vaping status: Some Days    Start date: 10/19/2024    Substances: THC    Devices: Disposable   Substance Use Topics    Alcohol use: Yes     Comment: Social    Drug use: No        Medications:    allopurinol (ZYLOPRIM) 300 MG tablet  celecoxib (CELEBREX) 100 MG capsule  colchicine (MITIGARE) 0.6 MG capsule  cyanocobalamin (VITAMIN B-12) 1000 MCG tablet  cyclobenzaprine (FLEXERIL) 10 MG tablet  lisinopril-hydrochlorothiazide (ZESTORETIC)  20-25 MG tablet  LORazepam (ATIVAN) 0.5 MG tablet  omeprazole (PRILOSEC) 40 MG DR capsule  rosuvastatin (CRESTOR) 20 MG tablet          Review of Systems   Constitutional:  Positive for fatigue. Negative for chills and fever.   Respiratory:  Positive for shortness of breath. Negative for cough and wheezing.    Cardiovascular:  Positive for palpitations. Negative for chest pain.   Gastrointestinal:  Positive for diarrhea. Negative for abdominal pain.   Genitourinary: Negative.    Musculoskeletal:  Negative for back pain.   Skin: Negative.    Neurological:  Positive for dizziness. Negative for headaches.   Hematological:  Does not bruise/bleed easily.       Physical Exam   BP: 123/77  Pulse: 78  Temp: 97.6  F (36.4  C)  Resp: 18  Weight: 73 kg (160 lb 13.2 oz)  SpO2: 95 %      Physical Exam  Vitals and nursing note reviewed.   Constitutional:       General: She is not in acute distress.     Appearance: Normal appearance. She is normal weight. She is not ill-appearing, toxic-appearing or diaphoretic.      Comments: Pleasant and talkative 64-year-old female found semireclined in no distress   Cardiovascular:      Rate and Rhythm: Normal rate and regular rhythm.   Pulmonary:      Effort: Pulmonary effort is normal.      Breath sounds: Normal breath sounds.   Abdominal:      Palpations: Abdomen is soft.      Tenderness: There is no abdominal tenderness.   Musculoskeletal:      Right lower leg: No edema.      Left lower leg: No edema.   Skin:     General: Skin is warm and dry.      Capillary Refill: Capillary refill takes less than 2 seconds.   Neurological:      General: No focal deficit present.      Mental Status: She is alert and oriented to person, place, and time.   Psychiatric:         Mood and Affect: Mood normal.         ED Course     ED Course as of 06/18/25 1413   Wed Jun 18, 2025   1134 Differential diagnoses considered but are not limited to acute coronary syndrome, pulmonary embolism, pneumonia, pneumothorax,  allergic/anaphylactic reaction, anemia, pericardial effusion, pleural effusion, exacerbation of CHF, asthma, or COPD exacerbation.    1154 Hemoglobin(!): 10.6  No significant change    1219 My independent review of the chest x-ray shows no evidence of focal consolidation, pneumothorax, or widened mediastinum.   1219 Troponin T, High Sensitivity: 12  Given the duration of symptoms and normal troponin, there is no reasonable indication for delta.   1227 Magnesium(!): 1.6   1231 My independent review of the EKG shows a sinus rhythm with a first-degree AV block at a rate of 77.  There are PACs present.  WA interval is prolonged at 276 ms.  QRS duration is normal.  QTc is 475 ms.  Axis is normal.  There is no concerning ST segments.     Procedures              Critical Care time:  none     None         Results for orders placed or performed during the hospital encounter of 06/18/25 (from the past 24 hours)   CBC with platelets differential    Narrative    The following orders were created for panel order CBC with platelets differential.  Procedure                               Abnormality         Status                     ---------                               -----------         ------                     CBC with platelets and ...[[id number]]  Abnormal            Final result                 Please view results for these tests on the individual orders.   D dimer quantitative   Result Value Ref Range    D-Dimer Quantitative 0.55 (H) 0.00 - 0.50 ug/mL FEU    Narrative    This D-dimer assay is intended for use in conjunction with a clinical pretest probability assessment model to exclude pulmonary embolism (PE) and deep venous thrombosis (DVT) in outpatients suspected of PE or DVT. The cut-off value is 0.50 ug/mL FEU.    For patients 50 years of age or older, the application of age-adjusted cut-off values for D-Dimer may increase the specificity without significant effect on sensitivity. The literature suggested  calculation age adjusted cut-off in ug/L = age in years x 10 ug/L. The results in this laboratory are reported as ug/mL rather than ug/L. The calculation for age adjusted cut off in ug/mL= age in years x 0.01 ug/mL. For example, the cut off for a 76 year old male is 76 x 0.01 ug/mL = 0.76 ug/mL (760 ug/L).    M Maximus et al. Age adjusted D-dimer cut-off levels to rule out pulmonary embolism: The ADJUST-PE Study. YOUSUF 2014;311:4971-5218.; HJ Kelley et al. Diagnostic accuracy of conventional or age adjusted D-dimer cutoff values in older patients with suspected venous thromboembolism. Systemic review and meta-analysis. BMJ 2013:346:f2492.   Comprehensive metabolic panel   Result Value Ref Range    Sodium 134 (L) 135 - 145 mmol/L    Potassium 4.1 3.4 - 5.3 mmol/L    Carbon Dioxide (CO2) 23 22 - 29 mmol/L    Anion Gap 12 7 - 15 mmol/L    Urea Nitrogen 25.8 (H) 8.0 - 23.0 mg/dL    Creatinine 1.05 (H) 0.51 - 0.95 mg/dL    GFR Estimate 59 (L) >60 mL/min/1.73m2    Calcium 9.7 8.8 - 10.4 mg/dL    Chloride 99 98 - 107 mmol/L    Glucose 125 (H) 70 - 99 mg/dL    Alkaline Phosphatase 103 40 - 150 U/L    AST 31 0 - 45 U/L    ALT 26 0 - 50 U/L    Protein Total 7.3 6.4 - 8.3 g/dL    Albumin 4.3 3.5 - 5.2 g/dL    Bilirubin Total 0.4 <=1.2 mg/dL   Troponin T, High Sensitivity   Result Value Ref Range    Troponin T, High Sensitivity 12 <=14 ng/L   Magnesium   Result Value Ref Range    Magnesium 1.6 (L) 1.7 - 2.3 mg/dL   TSH with free T4 reflex   Result Value Ref Range    TSH 1.06 0.30 - 4.20 uIU/mL   NT-proBNP   Result Value Ref Range    NT-proBNP 177 0 - 226 pg/mL   CBC with platelets and differential   Result Value Ref Range    WBC Count 5.9 4.0 - 11.0 10e3/uL    RBC Count 3.92 3.80 - 5.20 10e6/uL    Hemoglobin 10.6 (L) 11.7 - 15.7 g/dL    Hematocrit 31.6 (L) 35.0 - 47.0 %    MCV 81 78 - 100 fL    MCH 27.0 26.5 - 33.0 pg    MCHC 33.5 31.5 - 36.5 g/dL    RDW 13.5 10.0 - 15.0 %    Platelet Count 326 150 - 450 10e3/uL    %  Neutrophils 54 %    % Lymphocytes 32 %    % Monocytes 11 %    % Eosinophils 2 %    % Basophils 1 %    % Immature Granulocytes 0 %    NRBCs per 100 WBC 0 <1 /100    Absolute Neutrophils 3.2 1.6 - 8.3 10e3/uL    Absolute Lymphocytes 1.9 0.8 - 5.3 10e3/uL    Absolute Monocytes 0.7 0.0 - 1.3 10e3/uL    Absolute Eosinophils 0.1 0.0 - 0.7 10e3/uL    Absolute Basophils 0.0 0.0 - 0.2 10e3/uL    Absolute Immature Granulocytes 0.0 <=0.4 10e3/uL    Absolute NRBCs 0.0 10e3/uL   EKG 12-lead, tracing only   Result Value Ref Range    Systolic Blood Pressure  mmHg    Diastolic Blood Pressure  mmHg    Ventricular Rate 77 BPM    Atrial Rate 77 BPM    MS Interval 276 ms    QRS Duration 92 ms     ms    QTc 475 ms    P Axis 69 degrees    R AXIS 45 degrees    T Axis 36 degrees    Interpretation ECG       Sinus rhythm with 1st degree A-V block with Premature atrial complexes  Otherwise normal ECG  When compared with ECG of 20-Sep-2024 12:35,  Premature atrial complexes are now Present  MS interval has increased     XR Chest 2 Views    Narrative    Exam:  XR CHEST 2 VIEWS    HISTORY: Shortness of breath.    COMPARISON:  9/20/2024, 3/21/2024    FINDINGS:     The cardiomediastinal contours are normal.      Mild left basilar streaky opacity, likely atelectasis or scarring. No  pleural effusion or pneumothorax.      No acute osseous abnormality.       Impression    IMPRESSION:      Mild left basilar streaky opacity is likely atelectasis or scarring.      TACOS SCHROEDER MD         SYSTEM ID:  [id number]   CTA Chest with Contrast    Narrative    EXAM: CTA CHEST WITH CONTRAST, 6/18/2025 1:36 PM    HISTORY: Shortness of breath and elevated D-dimer    COMPARISON: Chest radiographs 6/18/2025; CT chest 9/20/2024    TECHNIQUE:   Imaging protocol: Multiplanar CT images of the chest after  administration of intravenous contrast. Meds given: ISOVUE 370  58mL.  Acquisition: This CT exam was performed using one or more the  following dose reduction  techniques: automated exposure control,  adjustment of the mA and/or kV according to patient size, and/or  iterative reconstruction technique.  Processing: 3D rendering on independent workstation using Maximum  Intensity Projection (MIP) was performed and archived to PACS. 3D  reconstructions are interpreted and reported by supervising  radiologist.    FINDINGS:     CHEST:    VESSELS AND HEART: There is adequate contrast bolus in the study.  Contrast bolus adequately opacifies the pulmonary arteries. No acute  pulmonary emboli to the subsegmental level. Aorta is nondilated and  demonstrates no acute abnormality. No cardiomegaly. Mild coronary  calcification.    LUNGS: Central airways are clear. No pulmonary mass. Scattered discoid  and subpleural atelectasis. No acute airspace opacities or  consolidation.  PLEURA: Within normal limits.  LYMPH NODES: No enlarged lymph nodes.  THYROID: Within normal limits.    BONES AND SOFT TISSUES:  No suspicious osseous lesions. Degenerative periarticular changes and  spinal spondylosis. ACDF changes.    UPPER ABDOMEN:  Limited evaluation of the upper abdomen demonstrates no acute  parenchymal abnormalities, nonobstructive bowel gas pattern, and no  free fluid or free air. 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 Left renal cortical cysts, hepatic cyst and additional too  small to characterize hypodensities, also likely cysts or benign  hemangiomas.      Impression    IMPRESSION:   No acute pulmonary emboli to the subsegmental level. No acute  abnormality of the chest.    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 Recommend follow-up CTA  in one year to evaluate for stability.    Hypodense foci in the liver, likely cysts or benign hemangiomas. If  not previously evaluated, recommend follow-up multiphasic CT or MRI to  better characterize.    SRI CALLAHAN MD         SYSTEM ID:  [id number]       Medications   lactated ringers BOLUS 1,000 mL (0 mLs Intravenous Stopped 6/18/25  1222)   magnesium sulfate 2 g in 50 mL sterile water intermittent infusion (0 g Intravenous Stopped 6/18/25 1317)   iopamidol (ISOVUE-370) solution 58 mL (58 mLs Intravenous $Given 6/18/25 1328)   sodium chloride (PF) 0.9% PF flush 100 mL (100 mLs Intravenous $Given 6/18/25 1328)       Assessments & Plan (with Medical Decision Making)   64-year-old female who presented to the emergency department ambulatory for evaluation of a few days of palpitations and dyspnea.  Also endorses some intermittent dizziness without headaches.  No fevers.  Broad differential was considered with broad workup initiated the emergency department to include a multitude of lab test, EKG, and ultimately cross-sectional imaging of the chest with the use of CT angiogram.  No emergent findings for the patient's etiology were identified.  Hypomagnesemia was found.  This was repleted in the emergency department with improvement of her symptoms.  At this time I believe that she can be safely discharged from the emergency department with close clinic follow-up and further evaluation based on her imaging studies.  These were discussed at length and the patient voiced complete understanding was agreeable.  Strict return precautions were provided.    Ms. Krause has also agreed to schedule a follow up appointment with her primary provider for re-evaluation and further management. She verbalized an understanding of the results of our workup and agrees with the complete discharge plan including instructions for return and follow up.  There is no indication for further investigation or treatment on an emergent basis.  There is no reasonably foreseeable injury that would be associated with discharge and close outpatient follow-up.      This document was prepared using a combination of typing and voice generated software.  While every attempt was made for accuracy, spelling and grammatical errors may exist.     I have reviewed the nursing notes.    I have  reviewed the findings, diagnosis, plan and need for follow up with the patient.           Medical Decision Making  The patient's presentation was of moderate complexity (an undiagnosed new problem with uncertain prognosis).    The patient's evaluation involved: Review of multiple previous labs  ordering and/or review of 3+ test(s) in this encounter (multiple labs)    The patient's management necessitated moderate risk (prescription drug management including medications given in the ED) and moderate risk (IV contrast administration).        New Prescriptions    No medications on file       Final diagnoses:   Dyspnea   Hypomagnesemia   Palpitations       6/18/2025   [facility]       Yunior Spaulding PA-C  06/18/25 1410

## 2025-06-18 NOTE — ED NOTES
Patient discharged to Home at this time. Patient given written and verbal discharge instructions regarding home care, follow-up, and medications. Patient verbalized understanding of all discharge instructions. Rest and hydration encouraged. Patient encouraged to return to the ED if they experience new, worsening, or concerning symptoms.     Patient to follow-up with PCP in 1 week.    Pt declined discharge vitals

## 2025-06-19 ENCOUNTER — LAB (OUTPATIENT)
Dept: LAB | Facility: OTHER | Age: 65
End: 2025-06-19
Payer: COMMERCIAL

## 2025-06-19 DIAGNOSIS — D50.0 IRON DEFICIENCY ANEMIA DUE TO CHRONIC BLOOD LOSS: ICD-10-CM

## 2025-06-19 DIAGNOSIS — D64.9 ANEMIA, UNSPECIFIED TYPE: ICD-10-CM

## 2025-06-19 LAB
BASOPHILS # BLD AUTO: 0 10E3/UL (ref 0–0.2)
BASOPHILS NFR BLD AUTO: 1 %
EOSINOPHIL # BLD AUTO: 0.1 10E3/UL (ref 0–0.7)
EOSINOPHIL NFR BLD AUTO: 3 %
ERYTHROCYTE [DISTWIDTH] IN BLOOD BY AUTOMATED COUNT: 13.5 % (ref 10–15)
FERRITIN SERPL-MCNC: 18 NG/ML (ref 11–328)
HCT VFR BLD AUTO: 34.1 % (ref 35–47)
HGB BLD-MCNC: 11.2 G/DL (ref 11.7–15.7)
IMM GRANULOCYTES # BLD: 0 10E3/UL
IMM GRANULOCYTES NFR BLD: 0 %
IRON BINDING CAPACITY (ROCHE): 398 UG/DL (ref 240–430)
IRON SATN MFR SERPL: 13 % (ref 15–46)
IRON SERPL-MCNC: 50 UG/DL (ref 37–145)
LYMPHOCYTES # BLD AUTO: 1.6 10E3/UL (ref 0.8–5.3)
LYMPHOCYTES NFR BLD AUTO: 35 %
MCH RBC QN AUTO: 26.7 PG (ref 26.5–33)
MCHC RBC AUTO-ENTMCNC: 32.8 G/DL (ref 31.5–36.5)
MCV RBC AUTO: 81 FL (ref 78–100)
MONOCYTES # BLD AUTO: 0.4 10E3/UL (ref 0–1.3)
MONOCYTES NFR BLD AUTO: 10 %
NEUTROPHILS # BLD AUTO: 2.3 10E3/UL (ref 1.6–8.3)
NEUTROPHILS NFR BLD AUTO: 51 %
NRBC # BLD AUTO: 0 10E3/UL
NRBC BLD AUTO-RTO: 0 /100
PLATELET # BLD AUTO: 364 10E3/UL (ref 150–450)
RBC # BLD AUTO: 4.19 10E6/UL (ref 3.8–5.2)
TOTAL PROTEIN SERUM FOR ELP: 7.2 G/DL (ref 6.4–8.3)
VIT B12 SERPL-MCNC: 2259 PG/ML (ref 232–1245)
WBC # BLD AUTO: 4.5 10E3/UL (ref 4–11)

## 2025-06-19 PROCEDURE — 82607 VITAMIN B-12: CPT

## 2025-06-19 PROCEDURE — 36415 COLL VENOUS BLD VENIPUNCTURE: CPT

## 2025-06-19 PROCEDURE — 83550 IRON BINDING TEST: CPT

## 2025-06-19 PROCEDURE — 83521 IG LIGHT CHAINS FREE EACH: CPT

## 2025-06-19 PROCEDURE — 83540 ASSAY OF IRON: CPT

## 2025-06-19 PROCEDURE — 86334 IMMUNOFIX E-PHORESIS SERUM: CPT | Mod: 26 | Performed by: PATHOLOGY

## 2025-06-19 PROCEDURE — 84165 PROTEIN E-PHORESIS SERUM: CPT | Mod: 26 | Performed by: PATHOLOGY

## 2025-06-19 PROCEDURE — 85025 COMPLETE CBC W/AUTO DIFF WBC: CPT

## 2025-06-19 PROCEDURE — 84155 ASSAY OF PROTEIN SERUM: CPT

## 2025-06-19 PROCEDURE — 86334 IMMUNOFIX E-PHORESIS SERUM: CPT | Performed by: PATHOLOGY

## 2025-06-19 PROCEDURE — 84165 PROTEIN E-PHORESIS SERUM: CPT | Performed by: PATHOLOGY

## 2025-06-19 PROCEDURE — 82728 ASSAY OF FERRITIN: CPT

## 2025-06-20 LAB
ALBUMIN SERPL ELPH-MCNC: 4.2 G/DL (ref 3.7–5.1)
ALPHA1 GLOB SERPL ELPH-MCNC: 0.3 G/DL (ref 0.2–0.4)
ALPHA2 GLOB SERPL ELPH-MCNC: 0.8 G/DL (ref 0.5–0.9)
B-GLOBULIN SERPL ELPH-MCNC: 0.9 G/DL (ref 0.6–1)
GAMMA GLOB SERPL ELPH-MCNC: 0.9 G/DL (ref 0.7–1.6)
KAPPA LC FREE SER-MCNC: 2.43 MG/DL (ref 0.33–1.94)
KAPPA LC FREE/LAMBDA FREE SER NEPH: 1.39 {RATIO} (ref 0.26–1.65)
LAMBDA LC FREE SERPL-MCNC: 1.75 MG/DL (ref 0.57–2.63)
M PROTEIN SERPL ELPH-MCNC: 0 G/DL
PROT PATTERN SERPL ELPH-IMP: NORMAL
PROT PATTERN SERPL IFE-IMP: NORMAL

## 2025-06-23 LAB
ATRIAL RATE - MUSE: 77 BPM
DIASTOLIC BLOOD PRESSURE - MUSE: NORMAL MMHG
INTERPRETATION ECG - MUSE: NORMAL
P AXIS - MUSE: 69 DEGREES
PR INTERVAL - MUSE: 276 MS
QRS DURATION - MUSE: 92 MS
QT - MUSE: 420 MS
QTC - MUSE: 475 MS
R AXIS - MUSE: 45 DEGREES
SYSTOLIC BLOOD PRESSURE - MUSE: NORMAL MMHG
T AXIS - MUSE: 36 DEGREES
VENTRICULAR RATE- MUSE: 77 BPM

## 2025-06-25 NOTE — PROGRESS NOTES
Hematology Follow-up Visit:  June 26, 2025    Reason for Visit:  Patient presents with:  Oncology Clinic Visit: Follow up Iron deficiency Anemia      Nursing Notes and Documentation reviewed:  yes    HPI: ***    Hematologic History:     progressive anemia starting from 2/22/2024.  At that time found to have a hemoglobin of 11.4 with MCV of 81.  Ferritin checked on 5/2/2024 was 45 with an iron saturation of 12 however it has decreased to a ferritin of 8 on 9/11/2024 with an iron saturation of 4.     She was started on iron replacement in May 2024 but was not able to tolerate it.  She  has been feeling very fatigued, cold, dizzy.  *** Denies any shortness of breath or chest pain.  Did require 1 unit of PRBC on 9/11/2024.  At that time her hemoglobin was 7.8.  Denies any blood in stools, change in color stools, or blood in urine.  Denies any abdominal pain, weight loss or appetite loss.     7/12/2024: CT abdomen pelvis with contrast:IMPRESSION: No intraperitoneal masses or inflammatory changes.      Her last colonoscopy was on 3/23/2023.  At that time she was found to have mild left-sided diverticulosis.  Plan to repeat colonoscopy in 5 years.  She had a EGD done on 10/22/2024 which did not show any source of bleeding.    ***  Received iron infusion with Venofer 200 mg X5 which she completed in November 2024.  A  She then received her iron infusion in November 2024.      Her hemoglobin today on 3/27/2025 is 11 with a ferritin of 48 with iron saturation of 18.  Folic acid when checked on 9/11/2024 was 15.8 and vitamin B12 level was 378.  Methylmalonic acid was mildly elevated at 0.63 and intrinsic antibody level was negative.  Also recommend she take vitamin B12 supplementation.     Her anemia is very likely from her iron deficiency.  However has not normalized despite having relatively adequate iron stores.  Complete the workup will also check SPEP HENRY and kappa lambda light chain, LDH, haptoglobin and reticulocyte  count.  We can do this with her next blood draw.     Till date she has had an EGD done 10/2024 which was negative.  Colonoscopy done 3/2023 showed left-sided diverticulosis but no source of bleeding.  CT abdomen pelvis 10/7/2024 was also negative for any masses.        Treatment: ***      Past Medical History:   Diagnosis Date    ACP (advance care planning) 07/19/2016    Advance Care Planning 7/19/2016: ACP Review of Chart / Resources Provided:  Reviewed chart for advance care plan.  Elsi ADITI Krause has no plan or code status on file. Discussed available resources and provided with information. Confirmed code status reflects current choices pending further ACP discussions.  Confirmed/documented legally designated decision makers.  Added by CASTILLO REYNA       Degenerative disc disease, lumbar     Depression     Diabetes (H)     Drug overdose 04/20/2015    History of blood transfusion     Hyperlipidemia     Hypertension     Impaired fasting glucose 04/04/2012    Leiomyoma of uterus, unspecified 09/19/2006    IMO Update 10/11    Osteoarthritis of ankle 08/22/2006    IMO Update 10/11    Urinary tract infection, site not specified 07/22/2002    Also 9/11/01, 11/18/04 IMO Update 10/11       Social History     Socioeconomic History    Marital status: Single     Spouse name: Justin    Number of children: 3    Years of education: 15    Highest education level: Not on file   Occupational History     Employer: [employer]   Tobacco Use    Smoking status: Some Days     Types: Vaping Device     Passive exposure: Past    Smokeless tobacco: Never   Vaping Use    Vaping status: Some Days    Start date: 10/19/2024    Substances: THC    Devices: Disposable   Substance and Sexual Activity    Alcohol use: Yes     Comment: Social    Drug use: No    Sexual activity: Yes     Partners: Male   Other Topics Concern    Parent/sibling w/ CABG, MI or angioplasty before 65F 55M? No   Social History Narrative    Not on file     Social  Drivers of Health     Financial Resource Strain: Low Risk  (9/30/2024)    Financial Resource Strain     Within the past 12 months, have you or your family members you live with been unable to get utilities (heat, electricity) when it was really needed?: No   Food Insecurity: Low Risk  (9/30/2024)    Food Insecurity     Within the past 12 months, did you worry that your food would run out before you got money to buy more?: No     Within the past 12 months, did the food you bought just not last and you didn t have money to get more?: No   Transportation Needs: Low Risk  (9/30/2024)    Transportation Needs     Within the past 12 months, has lack of transportation kept you from medical appointments, getting your medicines, non-medical meetings or appointments, work, or from getting things that you need?: No   Physical Activity: Unknown (9/30/2024)    Exercise Vital Sign     Days of Exercise per Week: Patient declined     Minutes of Exercise per Session: Not on file   Stress: Stress Concern Present (9/30/2024)    [employer] [place] [employer] - Occupational Stress Questionnaire     Feeling of Stress : To some extent   Social Connections: Unknown (9/30/2024)    Social Connection and Isolation Panel [NHANES]     Frequency of Communication with Friends and Family: Not on file     Frequency of Social Gatherings with Friends and Family: More than three times a week     Attends Hindu Services: Not on file     Active Member of Clubs or Organizations: Not on file     Attends Club or Organization Meetings: Not on file     Marital Status: Not on file   Interpersonal Safety: Low Risk  (6/26/2025)    Interpersonal Safety     Do you feel physically and emotionally safe where you currently live?: Yes     Within the past 12 months, have you been hit, slapped, kicked or otherwise physically hurt by someone?: No     Within the past 12 months, have you been humiliated or emotionally abused in other ways by your partner or  ex-partner?: No   Housing Stability: Low Risk  (9/30/2024)    Housing Stability     Do you have housing? : Yes     Are you worried about losing your housing?: No       Past Surgical History:   Procedure Laterality Date    CARPAL TUNNEL RELEASE RT/LT Right     CERVICAL FUSION      COLONOSCOPY N/A 3/23/2023    Procedure: COLONOSCOPY;  Surgeon: Jose Angel Fiore MD;  Location: [facility]    ENDOMETRIAL SAMPLING (BIOPSY)  12/27/2001    ESOPHAGOSCOPY, GASTROSCOPY, DUODENOSCOPY (EGD), COMBINED N/A 10/22/2024    Procedure: ESOPHAGOGASTRODUODENOSCOPY with biopsy;  Surgeon: Paddy Hernandez MD;  Location: [facility]    FOOT SURGERY Bilateral 01/01/1973    HC EXCISE CUTANEOUS NEUROMA  11/05/1997    Times 3     TUBAL LIGATION Bilateral 04/01/1989       Family History   Problem Relation Age of Onset    Diabetes Mother     Hypertension Mother     Hyperlipidemia Mother     Diabetes Father     Coronary Artery Disease Father     Hypertension Father     Hyperlipidemia Father     Chronic Obstructive Pulmonary Disease Father     Cancer - colorectal Father     Prostate Cancer Paternal Grandfather     Diabetes Sister     Hypertension Sister     Hyperlipidemia Sister     Neurologic Disorder Son         Cerebral palsy       Allergies:  Allergies as of 06/26/2025 - Reviewed 06/26/2025   Allergen Reaction Noted    Indomethacin Nausea and Vomiting 03/17/2023       Current Medications:  Current Outpatient Medications   Medication Sig Dispense Refill    allopurinol (ZYLOPRIM) 300 MG tablet Take 1 tablet (300 mg) by mouth daily. 90 tablet 3    celecoxib (CELEBREX) 100 MG capsule Take 1-2 capsules (100-200 mg) by mouth daily. 60 capsule 0    cyclobenzaprine (FLEXERIL) 10 MG tablet Take 1 tablet (10 mg) by mouth 3 times daily as needed for muscle spasms. 90 tablet 0    lisinopril-hydrochlorothiazide (ZESTORETIC) 20-25 MG tablet Take 1 tablet by mouth once daily 90 tablet 2    LORazepam (ATIVAN) 0.5 MG tablet Take 1 tablet (0.5 mg) by mouth nightly as needed  "for anxiety. 28 tablet 0    omeprazole (PRILOSEC) 40 MG DR capsule Take 1 capsule by mouth once daily 90 capsule 2    rosuvastatin (CRESTOR) 20 MG tablet Take 1 tablet by mouth once daily 90 tablet 1    colchicine (MITIGARE) 0.6 MG capsule Take 0.6 mg by mouth daily. (Patient not taking: Reported on 6/26/2025)      cyanocobalamin (VITAMIN B-12) 1000 MCG tablet Take 1 tablet (1,000 mcg) by mouth daily. (Patient not taking: Reported on 6/26/2025) 90 tablet 1          Review Of Systems:  Constitutional: denies fever, weight changes  Eyes: denies blurred or double vision  Ears/Nose/Throat: denies ear pain, nose problems, see HPI  Respiratory: denies shortness of breath, cough, or hemoptysis  Skin: denies rash, lesions  Cardiovascular: denies chest pain, palpitations, edema  Gastrointestinal: denies abdominal pain, bloating, nausea, vomiting, early satiety  Genitourinary: denies difficulty with urination, blood in urine  Musculoskeletal: denies new muscle pain, bone pain  Neurologic: denies lightheadedness,  numbness orTingling, headaches  Hematologic/Lymphatic/Immunologic: denies easy bruising, easy bleeding, edema, lumps or bumps noted  Endocrine: Denies increased thirst, night sweats      Physical Exam:  /64   Pulse 86   Temp 97.5  F (36.4  C) (Tympanic)   Resp 20   Ht 1.651 m (5' 5\")   Wt 72.7 kg (160 lb 4.4 oz)   SpO2 97%   BMI 26.67 kg/m    GENERAL APPEARANCE: Healthy, alert and in no acute distress.  HEENT: Normocephalic, Sclerae anicteric. Oropharynx without ulcers, lesions, or thrush.  NECK:  No asymmetry or masses, no thyromegaly.  LYMPHATICS: No palpable cervical, supraclavicular, axillary, or inguinal nodes   RESP: Lungs clear to auscultation bilaterally, respirations regular and easy  CARDIOVASCULAR: Regular rate and rhythm. Normal S1, S2; no murmur, gallop, or rub.  ABDOMEN: Soft, nontender. Bowel sounds auscultated all 4 quadrants. No palpable organomegaly or masses.  MUSCULOSKELETAL: " Extremities without gross deformities noted. No edema of bilateral lower extremities.  SKIN: No suspicious lesions or rashes.  NEURO: Alert and oriented x 3.  Gait steady.  PSYCHIATRIC: Mentation and affect appear normal.  Mood appropriate.    Laboratory/Imaging Studies:    Component      Latest Ref Rng 6/19/2025  10:58 AM   WBC      4.0 - 11.0 10e3/uL 4.5    RBC Count      3.80 - 5.20 10e6/uL 4.19    Hemoglobin      11.7 - 15.7 g/dL 11.2 (L)    Hematocrit      35.0 - 47.0 % 34.1 (L)    MCV      78 - 100 fL 81    MCH      26.5 - 33.0 pg 26.7    MCHC      31.5 - 36.5 g/dL 32.8    RDW      10.0 - 15.0 % 13.5    Platelet Count      150 - 450 10e3/uL 364    % Neutrophils      % 51    % Lymphocytes      % 35    % Monocytes      % 10    % Eosinophils      % 3    % Basophils      % 1    % Immature Granulocytes      % 0    NRBC/W      <1 /100 0    Absolute Neutrophil      1.6 - 8.3 10e3/uL 2.3    Absolute Lymphocytes      0.8 - 5.3 10e3/uL 1.6    Absolute Monocytes      0.0 - 1.3 10e3/uL 0.4    Absolute Eosinophils      0.0 - 0.7 10e3/uL 0.1    Absolute Basophils      0.0 - 0.2 10e3/uL 0.0    Absolute Immature Granulocytes      <=0.4 10e3/uL 0.0    Absolute NRBCs      10e3/uL 0.0    Albumin Fraction      3.7 - 5.1 g/dL 4.2    Alpha 1 Fraction      0.2 - 0.4 g/dL 0.3    Alpha 2 Fraction      0.5 - 0.9 g/dL 0.8    Beta Fraction      0.6 - 1.0 g/dL 0.9    Gamma Fraction      0.7 - 1.6 g/dL 0.9    Monoclonal Peak      <=0.0 g/dL 0.0    ELP Interpretation: Essentially normal electrophoretic pattern. No obvious monoclonal proteins seen. Pathologic significance requires clinical correlation. DORCAS De Leon M.D., Ph.D., Pathologist.    Iron      37 - 145 ug/dL 50    Iron Binding Capacity      240 - 430 ug/dL 398    Iron Sat Index      15 - 46 % 13 (L)    [place] Free Lt Chain      0.33 - 1.94 mg/dL 2.43 (H)    Lambda Free Lt Chain      0.57 - 2.63 mg/dL 1.75    Kappa Lambda Ratio      0.26 - 1.65  1.39    Ferritin      11 - 328 ng/mL  18    Vitamin B12      232 - 1,245 pg/mL 2,259 (H)    Immunofixation ELP No monoclonal protein seen on immunofixation. Pathologic significance requires clinical correlation. DORCAS De Leon M.D., Ph.D., Pathologist    Total Protein Serum for ELP      6.4 - 8.3 g/dL 7.2       Legend:  (L) Low  (H) High      ASSESSMENT/PLAN:    Low Vitamin B12:  Instructed to decrease her B12 to 500mcg daily.    *** minutes spent in the patient's encounter today with time spent in chart review along with chart preparation.  Time was also spent in obtaining a review of systems and performing a physical exam along with reviewing all of patient's lab work in detail with the patient.  Time was also spent ***and in planning future follow-up along with placing future orders and in chart documentation.      Yue Dumont NP APRN, FNP-BC, AOCNP

## 2025-06-26 ENCOUNTER — ONCOLOGY VISIT (OUTPATIENT)
Dept: ONCOLOGY | Facility: OTHER | Age: 65
End: 2025-06-26
Attending: INTERNAL MEDICINE
Payer: COMMERCIAL

## 2025-06-26 VITALS
WEIGHT: 160.27 LBS | TEMPERATURE: 97.5 F | SYSTOLIC BLOOD PRESSURE: 120 MMHG | RESPIRATION RATE: 20 BRPM | OXYGEN SATURATION: 97 % | BODY MASS INDEX: 26.7 KG/M2 | HEART RATE: 86 BPM | DIASTOLIC BLOOD PRESSURE: 64 MMHG | HEIGHT: 65 IN

## 2025-06-26 DIAGNOSIS — D50.0 IRON DEFICIENCY ANEMIA DUE TO CHRONIC BLOOD LOSS: Primary | ICD-10-CM

## 2025-06-26 DIAGNOSIS — R13.10 DYSPHAGIA, UNSPECIFIED TYPE: ICD-10-CM

## 2025-06-26 DIAGNOSIS — R49.0 HOARSENESS: ICD-10-CM

## 2025-06-26 ASSESSMENT — PAIN SCALES - GENERAL: PAINLEVEL_OUTOF10: NO PAIN (0)

## 2025-06-26 NOTE — NURSING NOTE
"Oncology Rooming Note    June 26, 2025 10:45 AM   Elsi Krause is a 64 year old female who presents for:    Chief Complaint   Patient presents with    Oncology Clinic Visit     Follow up Iron deficiency Anemia     Initial Vitals: /64   Pulse 86   Temp 97.5  F (36.4  C) (Tympanic)   Resp 20   Ht 1.651 m (5' 5\")   Wt 72.7 kg (160 lb 4.4 oz)   SpO2 97%   BMI 26.67 kg/m   Estimated body mass index is 26.67 kg/m  as calculated from the following:    Height as of this encounter: 1.651 m (5' 5\").    Weight as of this encounter: 72.7 kg (160 lb 4.4 oz). Body surface area is 1.83 meters squared.  No Pain (0) Comment: Data Unavailable   No LMP recorded. Patient is postmenopausal.  Allergies reviewed: Yes  Medications reviewed: Yes    Medications: Medication refills not needed today.  Pharmacy name entered into Psychiatric:    [id number] - [place], [place] - [address] [place] [address] AT [facility] OF  & [address] [place] [address] [id number] - [place], [place] - [address]     Frailty Screening:   Is the patient here for a new oncology consult visit in cancer care? 2. No    PHQ9:  Did this patient require a PHQ9?: No      Clinical concerns: seen at ER for shortness of breath and dizziness, fatigue. No vertigo today. ER did give her IV Magnesium       Zofia Chavez LPN             "

## 2025-06-26 NOTE — PROGRESS NOTES
"  Assessment & Plan     Palpitations  Dizziness  ER visit 6/18 for above symptoms, workup reassuring/stable, found to have mild hypomagnesemia which was repleted.  Does have chronic anemia as below which could contribute to above symptoms.  Fortunately has not had recurrence of notable dizziness or palpitations since ER visit.  Continue anemia workup as below and also rechecking magnesium.  - Basic metabolic panel  - Magnesium  - Zio patch if recurrence of palpitations    Hypomagnesemia  Mild, loading dose in ED.  Recheck today, if persistently low will start low-dose daily supplementation.  - Magnesium    Iron deficiency anemia, unspecified iron deficiency anemia type  Ongoing anemia workup since last fall; hematology follow-up yesterday.  Did not tolerate p.o. iron, did improve on Venofer x 5 last fall.  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  Has dealt with IBS type symptoms for years although probably more irregular over the past year.  Agree with GI referral.  Would also support repeat iron infusions, particularly if symptoms worsen once again.  - Following with hematology; appreciate assistance  - GI referral pending    Aneurysm of right renal artery  Incidental finding 5 mm right renal artery aneurysm on recent ED CT.  - 1 year CTA follow-up    Liver lesion  Incidental finding hypodense lesions; likely cysts or benign hemangioma on recent CT.  Did not discuss during today's visit but will reach out to Elsi regarding recommendation for follow-up imaging.  - Considering follow-up CT or MRI to better characterize benign-appearing liver lesion    MED REC REQUIRED  Post Medication Reconciliation Status:  Discharge medications reconciled, continue medications without change  BMI  Estimated body mass index is 26.44 kg/m  as calculated from the following:    Height as of 6/26/25: 1.651 m (5' 5\").    Weight as of this encounter: 72.1 kg " (158 lb 14.4 oz).   Weight management plan: Discussed healthy diet and exercise guidelines    I spent a total of 34 minutes on the day of the visit.   Time spent by me today doing chart review, history and exam, documentation and further activities per the note    The longitudinal plan of care for the diagnosis(es)/condition(s) as documented were addressed during this visit. Due to the added complexity in care, I will continue to support Elsi Krause in the subsequent management and with ongoing continuity of care.    Subjective   Elsi is a 64 year old, presenting for the following health issues:  ER F/U        6/27/2025     2:26 PM   Additional Questions   Roomed by Mikaela Cruz   Accompanied by None         6/27/2025     2:26 PM   Patient Reported Additional Medications   Patient reports taking the following new medications None     HPI      ED/UC Followup:    Facility:  [facility]  Date of visit: 06/18/2025  Reason for visit: shortness of breath  Current Status: tired and cold    -ER presentation 6/18 for shortness of breath, palpitations, intermittent dizziness  -Workup overall stable, chronic anemia  -Did find mild hypomagnesemia  -CTA chest negative for acute findings  -Did have incidental finding right renal artery aneurysm  -Also hypodense foci in the liver    -Magnesium repleted in ED  -Fortunately has not had recurrence of that significant dizziness or palpitations  -Also feels like her SOB is slightly improved    -Does have ongoing fatigue and frequently feels cold  -This is not new  -Has been thought to be related to her anemia  -She is following with hematology; recent visit yesterday  -She is referred to GI for ongoing suspected blood loss despite relatively normal scopes  -Does endorse bouts of watery diarrhea as well as constipation  -Has struggled with constipation essentially her whole life  -Episodes of watery diarrhea probably going on for a year or more    -Despite all her symptoms she still  going about her normal life  -Still working full-time as a /  -Exhausted by the end of the shift or end of the week    Review of Systems  Constitutional, HEENT, cardiovascular, pulmonary, gi and gu systems are negative, except as otherwise noted.      Objective    /69 (BP Location: Left arm, Patient Position: Sitting, Cuff Size: Adult Regular)   Pulse 94   Temp 97  F (36.1  C) (Tympanic)   Resp 14   Wt 72.1 kg (158 lb 14.4 oz)   SpO2 96%   BMI 26.44 kg/m    Body mass index is 26.44 kg/m .  Physical Exam  Vitals reviewed.   Constitutional:       General: She is not in acute distress.     Appearance: Normal appearance. She is not toxic-appearing.   HENT:      Head: Normocephalic and atraumatic.   Cardiovascular:      Rate and Rhythm: Normal rate and regular rhythm.      Heart sounds: Normal heart sounds.   Pulmonary:      Effort: Pulmonary effort is normal.      Breath sounds: Normal breath sounds.   Musculoskeletal:         General: Normal range of motion.      Right lower leg: No edema.      Left lower leg: No edema.   Skin:     General: Skin is warm and dry.   Neurological:      General: No focal deficit present.      Mental Status: She is alert and oriented to person, place, and time.   Psychiatric:         Mood and Affect: Mood normal.         Behavior: Behavior normal.        Signed Electronically by: Donaldo Seymour MD

## 2025-06-27 ENCOUNTER — OFFICE VISIT (OUTPATIENT)
Dept: FAMILY MEDICINE | Facility: OTHER | Age: 65
End: 2025-06-27
Attending: STUDENT IN AN ORGANIZED HEALTH CARE EDUCATION/TRAINING PROGRAM
Payer: COMMERCIAL

## 2025-06-27 VITALS
SYSTOLIC BLOOD PRESSURE: 109 MMHG | HEART RATE: 94 BPM | WEIGHT: 158.9 LBS | OXYGEN SATURATION: 96 % | RESPIRATION RATE: 14 BRPM | DIASTOLIC BLOOD PRESSURE: 69 MMHG | TEMPERATURE: 97 F | BODY MASS INDEX: 26.44 KG/M2

## 2025-06-27 DIAGNOSIS — K76.9 LIVER LESION: ICD-10-CM

## 2025-06-27 DIAGNOSIS — I72.2 ANEURYSM OF RIGHT RENAL ARTERY: ICD-10-CM

## 2025-06-27 DIAGNOSIS — D50.9 IRON DEFICIENCY ANEMIA, UNSPECIFIED IRON DEFICIENCY ANEMIA TYPE: ICD-10-CM

## 2025-06-27 DIAGNOSIS — E83.42 HYPOMAGNESEMIA: ICD-10-CM

## 2025-06-27 DIAGNOSIS — R00.2 PALPITATIONS: Primary | ICD-10-CM

## 2025-06-27 DIAGNOSIS — R42 DIZZINESS: ICD-10-CM

## 2025-06-27 LAB
ANION GAP SERPL CALCULATED.3IONS-SCNC: 12 MMOL/L (ref 7–15)
BUN SERPL-MCNC: 23.7 MG/DL (ref 8–23)
CALCIUM SERPL-MCNC: 10.5 MG/DL (ref 8.8–10.4)
CHLORIDE SERPL-SCNC: 100 MMOL/L (ref 98–107)
CREAT SERPL-MCNC: 1.13 MG/DL (ref 0.51–0.95)
EGFRCR SERPLBLD CKD-EPI 2021: 54 ML/MIN/1.73M2
GLUCOSE SERPL-MCNC: 102 MG/DL (ref 70–99)
HCO3 SERPL-SCNC: 24 MMOL/L (ref 22–29)
MAGNESIUM SERPL-MCNC: 1.6 MG/DL (ref 1.7–2.3)
POTASSIUM SERPL-SCNC: 4.1 MMOL/L (ref 3.4–5.3)
SODIUM SERPL-SCNC: 136 MMOL/L (ref 135–145)

## 2025-06-27 PROCEDURE — G2211 COMPLEX E/M VISIT ADD ON: HCPCS | Performed by: STUDENT IN AN ORGANIZED HEALTH CARE EDUCATION/TRAINING PROGRAM

## 2025-06-27 PROCEDURE — 99214 OFFICE O/P EST MOD 30 MIN: CPT | Performed by: STUDENT IN AN ORGANIZED HEALTH CARE EDUCATION/TRAINING PROGRAM

## 2025-06-27 PROCEDURE — 36415 COLL VENOUS BLD VENIPUNCTURE: CPT | Performed by: STUDENT IN AN ORGANIZED HEALTH CARE EDUCATION/TRAINING PROGRAM

## 2025-06-27 PROCEDURE — 80048 BASIC METABOLIC PNL TOTAL CA: CPT | Performed by: STUDENT IN AN ORGANIZED HEALTH CARE EDUCATION/TRAINING PROGRAM

## 2025-06-27 PROCEDURE — 1126F AMNT PAIN NOTED NONE PRSNT: CPT | Performed by: STUDENT IN AN ORGANIZED HEALTH CARE EDUCATION/TRAINING PROGRAM

## 2025-06-27 PROCEDURE — 83735 ASSAY OF MAGNESIUM: CPT | Performed by: STUDENT IN AN ORGANIZED HEALTH CARE EDUCATION/TRAINING PROGRAM

## 2025-06-27 PROCEDURE — 3078F DIAST BP <80 MM HG: CPT | Performed by: STUDENT IN AN ORGANIZED HEALTH CARE EDUCATION/TRAINING PROGRAM

## 2025-06-27 PROCEDURE — 3074F SYST BP LT 130 MM HG: CPT | Performed by: STUDENT IN AN ORGANIZED HEALTH CARE EDUCATION/TRAINING PROGRAM

## 2025-06-27 ASSESSMENT — ANXIETY QUESTIONNAIRES
7. FEELING AFRAID AS IF SOMETHING AWFUL MIGHT HAPPEN: NOT AT ALL
5. BEING SO RESTLESS THAT IT IS HARD TO SIT STILL: SEVERAL DAYS
GAD7 TOTAL SCORE: 8
3. WORRYING TOO MUCH ABOUT DIFFERENT THINGS: SEVERAL DAYS
4. TROUBLE RELAXING: NEARLY EVERY DAY
2. NOT BEING ABLE TO STOP OR CONTROL WORRYING: SEVERAL DAYS
IF YOU CHECKED OFF ANY PROBLEMS ON THIS QUESTIONNAIRE, HOW DIFFICULT HAVE THESE PROBLEMS MADE IT FOR YOU TO DO YOUR WORK, TAKE CARE OF THINGS AT HOME, OR GET ALONG WITH OTHER PEOPLE: NOT DIFFICULT AT ALL
1. FEELING NERVOUS, ANXIOUS, OR ON EDGE: SEVERAL DAYS
6. BECOMING EASILY ANNOYED OR IRRITABLE: SEVERAL DAYS
GAD7 TOTAL SCORE: 8

## 2025-06-27 ASSESSMENT — PATIENT HEALTH QUESTIONNAIRE - PHQ9
SUM OF ALL RESPONSES TO PHQ QUESTIONS 1-9: 3
10. IF YOU CHECKED OFF ANY PROBLEMS, HOW DIFFICULT HAVE THESE PROBLEMS MADE IT FOR YOU TO DO YOUR WORK, TAKE CARE OF THINGS AT HOME, OR GET ALONG WITH OTHER PEOPLE: NOT DIFFICULT AT ALL
SUM OF ALL RESPONSES TO PHQ QUESTIONS 1-9: 3

## 2025-06-27 ASSESSMENT — PAIN SCALES - GENERAL: PAINLEVEL_OUTOF10: NO PAIN (0)

## 2025-06-27 NOTE — Clinical Note
I saw Elsi on Friday for ER follow-up, while reviewing her chart and doing the note I discovered a couple incidental findings that I did not have a chance to discuss with her.  One is a small right renal artery aneurysm, nothing further to do but would recommend repeat imaging in 1 year to assess for stability.  The second was tiny benign-appearing liver lesion which is probably a cyst or hemangioma but would order a follow-up CT or MRI to better characterize this lesion if she is in agreement.  I can also discuss with her if she has further questions.  Thanks.

## 2025-06-28 PROBLEM — I72.2 ANEURYSM OF RIGHT RENAL ARTERY: Status: ACTIVE | Noted: 2025-06-28

## 2025-06-30 ENCOUNTER — TELEPHONE (OUTPATIENT)
Dept: CARE COORDINATION | Facility: OTHER | Age: 65
End: 2025-06-30

## 2025-06-30 DIAGNOSIS — K76.9 LIVER LESION: Primary | ICD-10-CM

## 2025-06-30 NOTE — TELEPHONE ENCOUNTER
Notified patient. Patient is agreeable to have either the CT or MRI. PCP to order.         Donaldo Seymour MD Anderson, Kerrisa, RN; Nyasia San RN  I saw Elsi on Friday for ER follow-up, while reviewing her chart and doing the note I discovered a couple incidental findings that I did not have a chance to discuss with her.  One is a small right renal artery aneurysm, nothing further to do but would recommend repeat imaging in 1 year to assess for stability.  The second was tiny benign-appearing liver lesion which is probably a cyst or hemangioma but would order a follow-up CT or MRI to better characterize this lesion if she is in agreement.  I can also discuss with her if she has further questions.  Thanks.

## 2025-07-09 ENCOUNTER — HOSPITAL ENCOUNTER (EMERGENCY)
Facility: HOSPITAL | Age: 65
Discharge: HOME | End: 2025-07-09
Attending: FAMILY MEDICINE
Payer: COMMERCIAL

## 2025-07-09 VITALS
SYSTOLIC BLOOD PRESSURE: 123 MMHG | OXYGEN SATURATION: 97 % | DIASTOLIC BLOOD PRESSURE: 69 MMHG | HEART RATE: 74 BPM | RESPIRATION RATE: 19 BRPM | TEMPERATURE: 97.8 F

## 2025-07-09 DIAGNOSIS — E86.0 DEHYDRATION: ICD-10-CM

## 2025-07-09 DIAGNOSIS — D64.9 ANEMIA, UNSPECIFIED TYPE: ICD-10-CM

## 2025-07-09 DIAGNOSIS — R42 LIGHTHEADEDNESS: ICD-10-CM

## 2025-07-09 LAB
ALBUMIN SERPL BCG-MCNC: 4.3 G/DL (ref 3.5–5.2)
ALP SERPL-CCNC: 116 U/L (ref 40–150)
ALT SERPL W P-5'-P-CCNC: 22 U/L (ref 0–50)
ANION GAP SERPL CALCULATED.3IONS-SCNC: 12 MMOL/L (ref 7–15)
AST SERPL W P-5'-P-CCNC: 31 U/L (ref 0–45)
BASOPHILS # BLD AUTO: 0 10E3/UL (ref 0–0.2)
BASOPHILS NFR BLD AUTO: 1 %
BILIRUB SERPL-MCNC: 0.3 MG/DL
BUN SERPL-MCNC: 24.7 MG/DL (ref 8–23)
CALCIUM SERPL-MCNC: 9.8 MG/DL (ref 8.8–10.4)
CHLORIDE SERPL-SCNC: 98 MMOL/L (ref 98–107)
CREAT SERPL-MCNC: 0.89 MG/DL (ref 0.51–0.95)
EGFRCR SERPLBLD CKD-EPI 2021: 72 ML/MIN/1.73M2
EOSINOPHIL # BLD AUTO: 0.1 10E3/UL (ref 0–0.7)
EOSINOPHIL NFR BLD AUTO: 3 %
ERYTHROCYTE [DISTWIDTH] IN BLOOD BY AUTOMATED COUNT: 14.4 % (ref 10–15)
GLUCOSE SERPL-MCNC: 149 MG/DL (ref 70–99)
HCO3 SERPL-SCNC: 23 MMOL/L (ref 22–29)
HCT VFR BLD AUTO: 31.7 % (ref 35–47)
HGB BLD-MCNC: 10.5 G/DL (ref 11.7–15.7)
HOLD SPECIMEN: NORMAL
IMM GRANULOCYTES # BLD: 0 10E3/UL
IMM GRANULOCYTES NFR BLD: 0 %
LYMPHOCYTES # BLD AUTO: 1 10E3/UL (ref 0.8–5.3)
LYMPHOCYTES NFR BLD AUTO: 23 %
MCH RBC QN AUTO: 26.9 PG (ref 26.5–33)
MCHC RBC AUTO-ENTMCNC: 33.1 G/DL (ref 31.5–36.5)
MCV RBC AUTO: 81 FL (ref 78–100)
MONOCYTES # BLD AUTO: 0.5 10E3/UL (ref 0–1.3)
MONOCYTES NFR BLD AUTO: 12 %
NEUTROPHILS # BLD AUTO: 2.6 10E3/UL (ref 1.6–8.3)
NEUTROPHILS NFR BLD AUTO: 61 %
NRBC # BLD AUTO: 0 10E3/UL
NRBC BLD AUTO-RTO: 0 /100
PLATELET # BLD AUTO: 314 10E3/UL (ref 150–450)
POTASSIUM SERPL-SCNC: 4.7 MMOL/L (ref 3.4–5.3)
PROT SERPL-MCNC: 7.3 G/DL (ref 6.4–8.3)
RBC # BLD AUTO: 3.91 10E6/UL (ref 3.8–5.2)
SODIUM SERPL-SCNC: 133 MMOL/L (ref 135–145)
WBC # BLD AUTO: 4.2 10E3/UL (ref 4–11)

## 2025-07-09 PROCEDURE — 99284 EMERGENCY DEPT VISIT MOD MDM: CPT | Performed by: FAMILY MEDICINE

## 2025-07-09 PROCEDURE — 258N000003 HC RX IP 258 OP 636: Performed by: FAMILY MEDICINE

## 2025-07-09 PROCEDURE — 93010 ELECTROCARDIOGRAM REPORT: CPT | Performed by: INTERNAL MEDICINE

## 2025-07-09 PROCEDURE — 36415 COLL VENOUS BLD VENIPUNCTURE: CPT | Performed by: FAMILY MEDICINE

## 2025-07-09 PROCEDURE — 93005 ELECTROCARDIOGRAM TRACING: CPT

## 2025-07-09 PROCEDURE — 99284 EMERGENCY DEPT VISIT MOD MDM: CPT | Mod: 25

## 2025-07-09 PROCEDURE — 84155 ASSAY OF PROTEIN SERUM: CPT | Performed by: FAMILY MEDICINE

## 2025-07-09 PROCEDURE — 85014 HEMATOCRIT: CPT | Performed by: FAMILY MEDICINE

## 2025-07-09 PROCEDURE — 96360 HYDRATION IV INFUSION INIT: CPT

## 2025-07-09 RX ADMIN — SODIUM CHLORIDE 1000 ML: 9 INJECTION, SOLUTION INTRAVENOUS at 10:15

## 2025-07-09 ASSESSMENT — ACTIVITIES OF DAILY LIVING (ADL)
ADLS_ACUITY_SCORE: 41

## 2025-07-09 ASSESSMENT — ENCOUNTER SYMPTOMS
SORE THROAT: 0
DIARRHEA: 0
CHILLS: 0
DYSURIA: 0
LIGHT-HEADEDNESS: 1
NECK STIFFNESS: 0
COUGH: 0
SHORTNESS OF BREATH: 1
VOMITING: 0
EYE REDNESS: 0
ABDOMINAL DISTENTION: 0
ABDOMINAL PAIN: 0
NAUSEA: 0
APPETITE CHANGE: 0
BLOOD IN STOOL: 0
FEVER: 0
ARTHRALGIAS: 0
FATIGUE: 1
PALPITATIONS: 0
DIZZINESS: 0
ANAL BLEEDING: 0
MYALGIAS: 0
ACTIVITY CHANGE: 0
HEMATURIA: 0
HEADACHES: 0
RHINORRHEA: 0
CONSTIPATION: 0

## 2025-07-09 NOTE — ED TRIAGE NOTES
Patient presents with complaints of dizziness when she woke up. She states it was even before she was up. Does endorse taking a gummy before bed last night. 1/2 a gummy      Triage Assessment (Adult)       Row Name 07/09/25 0808          Triage Assessment    Airway WDL WDL        Skin Circulation/Temperature WDL    Skin Circulation/Temperature WDL WDL        Peripheral/Neurovascular WDL    Peripheral Neurovascular WDL WDL        Cognitive/Neuro/Behavioral WDL    Cognitive/Neuro/Behavioral WDL WDL

## 2025-07-09 NOTE — ED PROVIDER NOTES
History     Chief Complaint   Patient presents with    Dizziness     The patient is a 64 year old female who present to the ED in [place] with a problem list significant for HTN, GERD, major depressive disorder, anxiety, DMT2, TIGRE, comes in for complaints of generalized weakness and concerns of low hemoglobin. She presents with complaints of lightheadedness that began this morning.  She has had this before.  She has been in multiple times with concerned about iron deficiency anemia.  This has required transfusion in the past.  She has had an upper and lower endoscopy without finding a definitive source for her bleeding.  She denies fevers or chills.  She takes her medications as prescribed.  She has difficulty sleeping at night and occasionally takes lorazepam.  Last night she took a THC gummy.  She has only used those on 2 prior occasions.  She does not remember having significant side effects from it.    She does report being stressed.  She normally works 16-hour days.    The history is provided by the patient, medical records and a friend.         Allergies:  Allergies   Allergen Reactions    Indomethacin Nausea and Vomiting       Problem List:    Patient Active Problem List    Diagnosis Date Noted    Aneurysm of right renal artery 06/28/2025     Priority: Medium    Iron deficiency anemia due to chronic blood loss 09/26/2024     Priority: Medium    Anemia 09/12/2024     Priority: Medium    Diabetes mellitus, type 2 (H) 10/02/2023     Priority: Medium    Hx of gout 03/13/2023     Priority: Medium    Gastroesophageal reflux disease with esophagitis without hemorrhage 03/13/2023     Priority: Medium    Hyperlipidemia LDL goal <100 03/13/2023     Priority: Medium    Recurrent major depressive disorder, in partial remission 03/13/2023     Priority: Medium    Anxiety 03/13/2023     Priority: Medium    Status post cervical spinal fusion 09/28/2017     Priority: Medium    Drug overdose, multiple drugs 04/20/2015      Priority: Medium    Cervical radicular pain 08/27/2014     Priority: Medium    Osteoarthritis of both feet 12/28/2011     Priority: Medium    Essential hypertension 02/07/2003     Priority: Medium     IMO Update          Past Medical History:    Past Medical History:   Diagnosis Date    ACP (advance care planning) 07/19/2016    Degenerative disc disease, lumbar     Depression     Diabetes (H)     Drug overdose 04/20/2015    History of blood transfusion     Hyperlipidemia     Hypertension     Impaired fasting glucose 04/04/2012    Leiomyoma of uterus, unspecified 09/19/2006    Osteoarthritis of ankle 08/22/2006    Urinary tract infection, site not specified 07/22/2002       Past Surgical History:    Past Surgical History:   Procedure Laterality Date    CARPAL TUNNEL RELEASE RT/LT Right     CERVICAL FUSION      COLONOSCOPY N/A 3/23/2023    Procedure: COLONOSCOPY;  Surgeon: Jose Angel Fiore MD;  Location: [facility]    ENDOMETRIAL SAMPLING (BIOPSY)  12/27/2001    ESOPHAGOSCOPY, GASTROSCOPY, DUODENOSCOPY (EGD), COMBINED N/A 10/22/2024    Procedure: ESOPHAGOGASTRODUODENOSCOPY with biopsy;  Surgeon: Paddy Hernandez MD;  Location: [facility] OR    FOOT SURGERY Bilateral 01/01/1973    HC EXCISE CUTANEOUS NEUROMA  11/05/1997    Times 3     TUBAL LIGATION Bilateral 04/01/1989       Family History:    Family History   Problem Relation Age of Onset    Diabetes Mother     Hypertension Mother     Hyperlipidemia Mother     Diabetes Father     Coronary Artery Disease Father     Hypertension Father     Hyperlipidemia Father     Chronic Obstructive Pulmonary Disease Father     Cancer - colorectal Father     Prostate Cancer Paternal Grandfather     Diabetes Sister     Hypertension Sister     Hyperlipidemia Sister     Neurologic Disorder Son         Cerebral palsy       Social History:  Marital Status:  Single [1]  Social History     Tobacco Use    Smoking status: Every Day     Types: Vaping Device     Passive exposure: Past    Smokeless tobacco:  Never   Vaping Use    Vaping status: Some Days    Start date: 10/19/2024    Substances: Nicotine, THC    Devices: Pre-filled or refillable cartridge, Refillable tank    Passive vaping exposure: Yes   Substance Use Topics    Alcohol use: Yes     Comment: weekly    Drug use: Yes     Types: Marijuana     Comment: gummies/drinks, sometimes        Medications:    allopurinol (ZYLOPRIM) 300 MG tablet  celecoxib (CELEBREX) 100 MG capsule  colchicine (MITIGARE) 0.6 MG capsule  cyanocobalamin (VITAMIN B-12) 1000 MCG tablet  cyclobenzaprine (FLEXERIL) 10 MG tablet  lisinopril-hydrochlorothiazide (ZESTORETIC) 20-25 MG tablet  LORazepam (ATIVAN) 0.5 MG tablet  magnesium oxide (MAG-OX) 400 MG tablet  omeprazole (PRILOSEC) 40 MG DR capsule  rosuvastatin (CRESTOR) 20 MG tablet          Review of Systems   Constitutional:  Positive for fatigue. Negative for activity change, appetite change, chills and fever.   HENT:  Negative for congestion, rhinorrhea and sore throat.    Eyes:  Negative for redness.   Respiratory:  Positive for shortness of breath. Negative for cough.    Cardiovascular:  Negative for chest pain, palpitations and leg swelling.   Gastrointestinal:  Negative for abdominal distention, abdominal pain, anal bleeding, blood in stool, constipation, diarrhea, nausea and vomiting.   Genitourinary:  Negative for dysuria and hematuria.   Musculoskeletal:  Negative for arthralgias, myalgias and neck stiffness.   Skin:  Negative for rash.   Neurological:  Positive for light-headedness. Negative for dizziness and headaches.       Physical Exam   BP: 130/81  Pulse: 89  Temp: 97.8  F (36.6  C)  Resp: 18  SpO2: 94 %      Physical Exam  Vitals and nursing note reviewed. Exam conducted with a chaperone present.   Constitutional:       General: She is not in acute distress.     Appearance: Normal appearance. She is not diaphoretic.   HENT:      Head: Atraumatic.      Mouth/Throat:      Mouth: Mucous membranes are moist.   Eyes:       General: No scleral icterus.     Conjunctiva/sclera: Conjunctivae normal.   Cardiovascular:      Rate and Rhythm: Normal rate.      Heart sounds: Normal heart sounds.   Pulmonary:      Effort: No respiratory distress.      Breath sounds: Normal breath sounds.   Abdominal:      General: Abdomen is flat.   Musculoskeletal:      Cervical back: Neck supple.   Skin:     General: Skin is warm.      Findings: No rash.   Neurological:      Mental Status: She is alert.         ED Course     ED Course as of 07/09/25 1936 Wed Jul 09, 2025   0856 Hemoglobin(!): 10.5  Chronic and stable     Procedures              Recent Results (from the past 24 hours)   EKG 12-lead, tracing only   Result Value Ref Range    Systolic Blood Pressure  mmHg    Diastolic Blood Pressure  mmHg    Ventricular Rate 81 BPM    Atrial Rate 81 BPM    IA Interval 178 ms    QRS Duration 92 ms     ms    QTc 464 ms    P Axis 62 degrees    R AXIS 46 degrees    T Axis 50 degrees    Interpretation ECG       Sinus rhythm  Normal ECG  When compared with ECG of 18-Jun-2025 12:03,  Premature atrial complexes are no longer Present  IA interval has decreased     [facility] Draw    Narrative    The following orders were created for panel order [place] Draw.  Procedure                               Abnormality         Status                     ---------                               -----------         ------                     Extra Blue Top Tube[[id number]]                             Final result               Extra Red Top Tube[[id number]]                              Final result               Extra Green Top (Lithiu...[[id number]]                      Final result               Extra Green Top (Lithiu...[[id number]]                      Final result               Extra Purple Top Tube[[id number]]                           Final result                 Please view results for these tests on the individual orders.   Extra Blue Top Tube   Result Value Ref Range     Hold Specimen JIC    Extra Red Top Tube   Result Value Ref Range    Hold Specimen JIC    Extra Green Top (Lithium Heparin) Tube   Result Value Ref Range    Hold Specimen JIC    Extra Green Top (Lithium Heparin) Tube   Result Value Ref Range    Hold Specimen JIC    Extra Purple Top Tube   Result Value Ref Range    Hold Specimen JIC    CBC with platelets differential    Narrative    The following orders were created for panel order CBC with platelets differential.  Procedure                               Abnormality         Status                     ---------                               -----------         ------                     CBC with platelets and ...[[id number]]  Abnormal            Final result                 Please view results for these tests on the individual orders.   Comprehensive metabolic panel   Result Value Ref Range    Sodium 133 (L) 135 - 145 mmol/L    Potassium 4.7 3.4 - 5.3 mmol/L    Carbon Dioxide (CO2) 23 22 - 29 mmol/L    Anion Gap 12 7 - 15 mmol/L    Urea Nitrogen 24.7 (H) 8.0 - 23.0 mg/dL    Creatinine 0.89 0.51 - 0.95 mg/dL    GFR Estimate 72 >60 mL/min/1.73m2    Calcium 9.8 8.8 - 10.4 mg/dL    Chloride 98 98 - 107 mmol/L    Glucose 149 (H) 70 - 99 mg/dL    Alkaline Phosphatase 116 40 - 150 U/L    AST 31 0 - 45 U/L    ALT 22 0 - 50 U/L    Protein Total 7.3 6.4 - 8.3 g/dL    Albumin 4.3 3.5 - 5.2 g/dL    Bilirubin Total 0.3 <=1.2 mg/dL   CBC with platelets and differential   Result Value Ref Range    WBC Count 4.2 4.0 - 11.0 10e3/uL    RBC Count 3.91 3.80 - 5.20 10e6/uL    Hemoglobin 10.5 (L) 11.7 - 15.7 g/dL    Hematocrit 31.7 (L) 35.0 - 47.0 %    MCV 81 78 - 100 fL    MCH 26.9 26.5 - 33.0 pg    MCHC 33.1 31.5 - 36.5 g/dL    RDW 14.4 10.0 - 15.0 %    Platelet Count 314 150 - 450 10e3/uL    % Neutrophils 61 %    % Lymphocytes 23 %    % Monocytes 12 %    % Eosinophils 3 %    % Basophils 1 %    % Immature Granulocytes 0 %    NRBCs per 100 WBC 0 <1 /100    Absolute Neutrophils 2.6 1.6 -  8.3 10e3/uL    Absolute Lymphocytes 1.0 0.8 - 5.3 10e3/uL    Absolute Monocytes 0.5 0.0 - 1.3 10e3/uL    Absolute Eosinophils 0.1 0.0 - 0.7 10e3/uL    Absolute Basophils 0.0 0.0 - 0.2 10e3/uL    Absolute Immature Granulocytes 0.0 <=0.4 10e3/uL    Absolute NRBCs 0.0 10e3/uL       Medications   sodium chloride 0.9% BOLUS 1,000 mL (0 mLs Intravenous Stopped 7/9/25 1131)       Assessments & Plan (with Medical Decision Making)     I have reviewed the nursing notes.    I have reviewed the findings, diagnosis, plan and need for follow up with the patient.  The patient is a 64-year-old female who presents to the emergency department with generalized fatigue and lightheadedness.  The working diagnosis is that the patient has taken benzodiazepines with a THC gummy.  This is most likely given that her hemoglobin is stable and she is not orthostatic.  History and physical examination do not support other diagnoses at this time.  She does have chronic normocytic, normochromic anemia.  She may benefit from additional laboratory testing in clinic that could include reticulocyte count, iron studies, LDH, and haptoglobin to rule out anemia of chronic disease and bone marrow suppression.  Depending on those results she may benefit from an endocrine workup.  Symptomatic therapies were reviewed.  Reasons to return to the emergency department discussed in detail.    Medical Decision Making  Differential diagnosis: Orthostatic hypotension, arrhythmia, aortic stenosis, heart failure, MI, TIA, Parkinson's disease, peripheral neuropathy, seizure or postictal states, vertigo, vestibular neuritis, Ménière's disease, cerebellar stroke or tumor, hypoglycemia, electrolyte imbalance, anemia, thyroid disorder, medication reaction (antihypertensives, diuretics, sedatives, TCAs, antipsychotics, polypharmacy), anxiety, depression, dehydration, postprandial hypotension, deconditioning, and chronic fatigue.        Current Discharge Medication List           Final diagnoses:   Lightheadedness   Anemia, unspecified type - Normocytic normochromic   Dehydration       7/9/2025   [facility]       Marcus Adam MD  07/09/25 1941

## 2025-07-10 LAB
ATRIAL RATE - MUSE: 81 BPM
DIASTOLIC BLOOD PRESSURE - MUSE: NORMAL MMHG
INTERPRETATION ECG - MUSE: NORMAL
P AXIS - MUSE: 62 DEGREES
PR INTERVAL - MUSE: 178 MS
QRS DURATION - MUSE: 92 MS
QT - MUSE: 400 MS
QTC - MUSE: 464 MS
R AXIS - MUSE: 46 DEGREES
SYSTOLIC BLOOD PRESSURE - MUSE: NORMAL MMHG
T AXIS - MUSE: 50 DEGREES
VENTRICULAR RATE- MUSE: 81 BPM

## 2025-07-21 ENCOUNTER — MYC REFILL (OUTPATIENT)
Dept: FAMILY MEDICINE | Facility: OTHER | Age: 65
End: 2025-07-21

## 2025-07-21 DIAGNOSIS — M19.90 GENERALIZED ARTHRITIS: ICD-10-CM

## 2025-07-21 RX ORDER — CELECOXIB 100 MG/1
100-200 CAPSULE ORAL DAILY
Qty: 60 CAPSULE | Refills: 0 | Status: SHIPPED | OUTPATIENT
Start: 2025-07-21

## 2025-07-21 NOTE — TELEPHONE ENCOUNTER
celecoxib (CELEBREX) 100 MG capsule         Last Written Prescription Date:  6/17/25  Last Fill Quantity: 60,   # refills: 0  Last Office Visit: 6/27/25  Future Office visit:    Next 5 appointments (look out 90 days)      Jul 25, 2025 3:00 PM  (Arrive by 2:45 PM)  [facility] [place] [facility] [address] [place] [address]  [phone]     Aug 22, 2025 9:30 AM  (Arrive by 9:15 AM)  [facility] [place] [facility] [place] [facility] [address] [place] [address]  [phone]     Oct 01, 2025 9:00 AM  (Arrive by 8:45 AM)  Adult Preventative Visit with Donaldo Seymour MD  [facility] [place] ([facility] [place] ) [address] [place] [address]  [phone]             Routing refill request to provider for review/approval because:  NSAID Medications Failed      Normal CBC on file in past 12 months        Recent Labs   Lab Test 07/09/25  0840   WBC 4.2   RBC 3.91   HGB 10.5*   HCT 31.7*

## 2025-07-21 NOTE — PROGRESS NOTES
Assessment & Plan     Abdominal cramping  Diarrhea, unspecified type  Screening for malignant neoplasm of colon  Going on about 1 year of waxing and waning abdominal discomfort and irregular bowel habits of unclear etiology, multiple ED visits and clinic encounters.  Also has been dealing with persistent anemia over same timeframe and has required iron infusions.  EGD normal 10/22/2024.  Recently MR abdomen 7/17/2025 to follow-up hypodense liver lesion suspected to be benign liver cyst, with remainder of findings normal.  Has never reported typical infectious symptoms, no B symptoms, and no history suspicious for IBD.  Intermittent abdominal pain and cramping, mild bloating, and fluctuation between constipation and watery diarrhea.  Symptom onset did coincide with change from her chronic meloxicam to Celebrex about 1 year ago due to the new onset anemia, otherwise no significant med changes or suspicious culprits on med list.  Last colonoscopy 3/23/2023 with recommendation for 5-year follow-up given persistent symptoms unclear etiology, discussed proceeding with diagnostic colonoscopy.  - CBC with platelets and differential  - Comprehensive metabolic panel (BMP + Alb, Alk Phos, ALT, AST, Total. Bili, TP)  - UA Macroscopic with reflex to Microscopic and Culture  - Adult GI  Referral - Procedure Only; Future    Generalized arthritis  Had been doing well on longstanding meloxicam up until about 1 year ago at which point was discontinued due to new onset anemia.  Switched to Celebrex and has been struggling with above GI symptoms since.  Unsure if any significant connection but given timeline will try stopping Celebrex and given short course of meloxicam as needed but she does struggle severely with general arthritis discomfort.  Did reiterate risks of going NSAID use particularly in the setting of persistent anemia and GI symptoms.  Finds anti-inflammatories very beneficial but given risk could consider  alternate therapies such as gabapentin/Lyrica, Cymbalta, tramadol.  Will be ongoing discussion.  - meloxicam (MOBIC) 7.5 MG tablet; Take 1-2 tablets (7.5-15 mg) by mouth daily.    Nicotine/Tobacco Cessation  She reports that she has been smoking vaping device. She has been exposed to tobacco smoke. She has never used smokeless tobacco.  Nicotine/Tobacco Cessation Plan  Did not discuss today.    I spent a total of 40 minutes on the day of the visit.   Time spent by me today doing chart review, history and exam, documentation and further activities per the note    The longitudinal plan of care for the diagnosis(es)/condition(s) as documented were addressed during this visit. Due to the added complexity in care, I will continue to support Elsi PENNINGTON Nelida in the subsequent management and with ongoing continuity of care.    Follow-up TBD.    Martha   Elsi is a 64 year old, presenting for the following health issues:  Flank Pain    History of Present Illness       Reason for visit:  My side hurts and blood may be low    She eats 0-1 servings of fruits and vegetables daily.She consumes 0 sweetened beverage(s) daily.She exercises with enough effort to increase her heart rate 60 or more minutes per day.  She exercises with enough effort to increase her heart rate 4 days per week.   She is taking medications regularly.        Pain History:  When did you first notice your pain? A couple weeks   Have you seen anyone else for your pain? No  How has your pain affected your ability to work? Pain does not limit ability to work   What type of work do you or did you do?  [employer]   Where in your body do you have pain? Right side     Abdominal pain  -Abnormal GI symptoms for about the past year  -Has had a couple of bad flares over the past couple of weeks  -Recent flares seem more right-sided, fortunately today symptoms are not too bad  -Continues to have intermittent loose watery stools  -Has also been struggling with persistent  "anemia, concerned hemoglobin might be low again  -Does not seem positional or muscular in nature  -It seems like side ache has been prodromal to diarrhea episodes recently  -No blood, melena, hematochezia, mucus in the stool  -No nausea or vomiting  -Minimal bloating sensation  -Continues to complain of persistent fatigue  -Has been trying to push electrolytes and fluids  -Denies dysuria  -No fevers, chills, flulike symptoms  -No unintentional weight loss or night sweats    -Does not feel like it is related to magnesium or other medications  -Had been switched from chronic meloxicam to Celebrex about a year ago due to new anemia; this is about the same timeframe as when she started dealing with abdominal discomfort    -Did have EGD 10/22/2024 which was reassuring  -Last colonoscopy 3/23/2023; recommendation for 5-year follow-up    Review of Systems  Constitutional, HEENT, cardiovascular, pulmonary, gi and gu systems are negative, except as otherwise noted.      Objective    /62 (BP Location: Left arm, Patient Position: Sitting, Cuff Size: Adult Regular)   Pulse 91   Temp 98.7  F (37.1  C) (Tympanic)   Resp 16   Ht 1.651 m (5' 5\")   Wt 73.2 kg (161 lb 4.8 oz)   SpO2 95%   BMI 26.84 kg/m    Body mass index is 26.84 kg/m .  Physical Exam  Vitals reviewed.   Constitutional:       General: She is not in acute distress.     Appearance: Normal appearance. She is not toxic-appearing.   HENT:      Head: Normocephalic and atraumatic.   Cardiovascular:      Rate and Rhythm: Normal rate and regular rhythm.      Heart sounds: Normal heart sounds.   Pulmonary:      Effort: Pulmonary effort is normal.      Breath sounds: Normal breath sounds.   Abdominal:      General: Abdomen is flat. There is no distension.      Palpations: Abdomen is soft.      Tenderness: There is no abdominal tenderness. There is no right CVA tenderness, left CVA tenderness, guarding or rebound.   Skin:     General: Skin is warm and dry. "   Neurological:      General: No focal deficit present.      Mental Status: She is alert and oriented to person, place, and time.   Psychiatric:         Mood and Affect: Mood normal.         Behavior: Behavior normal.       Signed Electronically by: Donaldo Seymour MD

## 2025-07-22 RX ORDER — CELECOXIB 100 MG/1
100-200 CAPSULE ORAL DAILY
Qty: 60 CAPSULE | Refills: 0 | OUTPATIENT
Start: 2025-07-22

## 2025-07-25 ENCOUNTER — OFFICE VISIT (OUTPATIENT)
Dept: FAMILY MEDICINE | Facility: OTHER | Age: 65
End: 2025-07-25
Attending: STUDENT IN AN ORGANIZED HEALTH CARE EDUCATION/TRAINING PROGRAM
Payer: COMMERCIAL

## 2025-07-25 VITALS
TEMPERATURE: 98.7 F | OXYGEN SATURATION: 95 % | HEIGHT: 65 IN | BODY MASS INDEX: 26.87 KG/M2 | SYSTOLIC BLOOD PRESSURE: 124 MMHG | RESPIRATION RATE: 16 BRPM | DIASTOLIC BLOOD PRESSURE: 62 MMHG | HEART RATE: 91 BPM | WEIGHT: 161.3 LBS

## 2025-07-25 DIAGNOSIS — Z12.11 SCREENING FOR MALIGNANT NEOPLASM OF COLON: ICD-10-CM

## 2025-07-25 DIAGNOSIS — R10.9 ABDOMINAL CRAMPING: Primary | ICD-10-CM

## 2025-07-25 DIAGNOSIS — R19.7 DIARRHEA, UNSPECIFIED TYPE: ICD-10-CM

## 2025-07-25 DIAGNOSIS — M19.90 GENERALIZED ARTHRITIS: ICD-10-CM

## 2025-07-25 LAB
ALBUMIN SERPL BCG-MCNC: 4.5 G/DL (ref 3.5–5.2)
ALP SERPL-CCNC: 115 U/L (ref 40–150)
ALT SERPL W P-5'-P-CCNC: 23 U/L (ref 0–50)
ANION GAP SERPL CALCULATED.3IONS-SCNC: 14 MMOL/L (ref 7–15)
AST SERPL W P-5'-P-CCNC: 30 U/L (ref 0–45)
BASOPHILS # BLD AUTO: 0 10E3/UL (ref 0–0.2)
BASOPHILS NFR BLD AUTO: 1 %
BILIRUB SERPL-MCNC: 0.3 MG/DL
BUN SERPL-MCNC: 18.8 MG/DL (ref 8–23)
CALCIUM SERPL-MCNC: 10.2 MG/DL (ref 8.8–10.4)
CHLORIDE SERPL-SCNC: 97 MMOL/L (ref 98–107)
CREAT SERPL-MCNC: 1 MG/DL (ref 0.51–0.95)
EGFRCR SERPLBLD CKD-EPI 2021: 63 ML/MIN/1.73M2
EOSINOPHIL # BLD AUTO: 0.1 10E3/UL (ref 0–0.7)
EOSINOPHIL NFR BLD AUTO: 2 %
ERYTHROCYTE [DISTWIDTH] IN BLOOD BY AUTOMATED COUNT: 14.6 % (ref 10–15)
GLUCOSE SERPL-MCNC: 99 MG/DL (ref 70–99)
HCO3 SERPL-SCNC: 23 MMOL/L (ref 22–29)
HCT VFR BLD AUTO: 33.2 % (ref 35–47)
HGB BLD-MCNC: 11.1 G/DL (ref 11.7–15.7)
IMM GRANULOCYTES # BLD: 0 10E3/UL
IMM GRANULOCYTES NFR BLD: 0 %
LYMPHOCYTES # BLD AUTO: 2.5 10E3/UL (ref 0.8–5.3)
LYMPHOCYTES NFR BLD AUTO: 29 %
MCH RBC QN AUTO: 26.6 PG (ref 26.5–33)
MCHC RBC AUTO-ENTMCNC: 33.4 G/DL (ref 31.5–36.5)
MCV RBC AUTO: 80 FL (ref 78–100)
MONOCYTES # BLD AUTO: 0.9 10E3/UL (ref 0–1.3)
MONOCYTES NFR BLD AUTO: 10 %
NEUTROPHILS # BLD AUTO: 5 10E3/UL (ref 1.6–8.3)
NEUTROPHILS NFR BLD AUTO: 58 %
NRBC # BLD AUTO: 0 10E3/UL
NRBC BLD AUTO-RTO: 0 /100
PLATELET # BLD AUTO: 383 10E3/UL (ref 150–450)
POTASSIUM SERPL-SCNC: 4.1 MMOL/L (ref 3.4–5.3)
PROT SERPL-MCNC: 7.3 G/DL (ref 6.4–8.3)
RBC # BLD AUTO: 4.17 10E6/UL (ref 3.8–5.2)
SODIUM SERPL-SCNC: 134 MMOL/L (ref 135–145)
WBC # BLD AUTO: 8.6 10E3/UL (ref 4–11)

## 2025-07-25 RX ORDER — MELOXICAM 7.5 MG/1
7.5-15 TABLET ORAL DAILY
Qty: 60 TABLET | Refills: 0 | Status: SHIPPED | OUTPATIENT
Start: 2025-07-25

## 2025-07-25 ASSESSMENT — PAIN SCALES - GENERAL: PAINLEVEL_OUTOF10: SEVERE PAIN (7)

## 2025-07-29 LAB
ALBUMIN UR-MCNC: NEGATIVE MG/DL
APPEARANCE UR: CLEAR
BILIRUB UR QL STRIP: NEGATIVE
COLOR UR AUTO: YELLOW
GLUCOSE UR STRIP-MCNC: NEGATIVE MG/DL
HGB UR QL STRIP: NEGATIVE
KETONES UR STRIP-MCNC: NEGATIVE MG/DL
LEUKOCYTE ESTERASE UR QL STRIP: NEGATIVE
NITRATE UR QL: NEGATIVE
PH UR STRIP: 5.5 [PH] (ref 5–7)
SP GR UR STRIP: <=1.005 (ref 1–1.03)
UROBILINOGEN UR STRIP-MCNC: 0.2 MG/DL

## 2025-08-03 ENCOUNTER — HEALTH MAINTENANCE LETTER (OUTPATIENT)
Age: 65
End: 2025-08-03

## 2025-08-07 ENCOUNTER — OFFICE VISIT (OUTPATIENT)
Dept: SURGERY | Facility: OTHER | Age: 65
End: 2025-08-07
Attending: STUDENT IN AN ORGANIZED HEALTH CARE EDUCATION/TRAINING PROGRAM
Payer: COMMERCIAL

## 2025-08-07 VITALS
OXYGEN SATURATION: 99 % | TEMPERATURE: 98.6 F | DIASTOLIC BLOOD PRESSURE: 74 MMHG | RESPIRATION RATE: 16 BRPM | SYSTOLIC BLOOD PRESSURE: 132 MMHG | HEART RATE: 77 BPM

## 2025-08-07 DIAGNOSIS — K59.04 CHRONIC IDIOPATHIC CONSTIPATION: Primary | ICD-10-CM

## 2025-08-07 ASSESSMENT — PAIN SCALES - GENERAL: PAINLEVEL_OUTOF10: NO PAIN (0)

## (undated) DEVICE — SYR 30ML SLIP TIP W/O NDL 302833

## (undated) DEVICE — SUCTION MANIFOLD NEPTUNE 2 SYS 1 PORT 702-025-000

## (undated) DEVICE — CANISTER SUCTION MEDI-VAC GUARDIAN 2000ML 90D 65651-220

## (undated) DEVICE — JELLY LUBRICATING SURGILUBE 2OZ TUBE

## (undated) DEVICE — TUBING SUCTION 20FT N620A

## (undated) DEVICE — SOL WATER IRRIG 1000ML BOTTLE 2F7114

## (undated) DEVICE — CONNECTOR ERBEFLO 2 PORT 20325-215

## (undated) DEVICE — FORCEP COLON BIOPSY STD W/NEEDLE 160CM M00513390

## (undated) DEVICE — ENDO BITE BLOCK ADULT OLYMPUS LATEX FREE MAJ-1632

## (undated) RX ORDER — IODINE AND POTASSIUM IODIDE 50; 100 MG/ML; MG/ML
LIQUID ORAL
Status: DISPENSED
Start: 2024-12-12